# Patient Record
Sex: FEMALE | Race: WHITE | NOT HISPANIC OR LATINO | Employment: OTHER | URBAN - METROPOLITAN AREA
[De-identification: names, ages, dates, MRNs, and addresses within clinical notes are randomized per-mention and may not be internally consistent; named-entity substitution may affect disease eponyms.]

---

## 2017-01-23 ENCOUNTER — OFFICE VISIT (OUTPATIENT)
Dept: URGENT CARE | Facility: PHYSICIAN GROUP | Age: 77
End: 2017-01-23
Payer: MEDICARE

## 2017-01-23 VITALS
WEIGHT: 164 LBS | RESPIRATION RATE: 14 BRPM | OXYGEN SATURATION: 96 % | HEART RATE: 82 BPM | HEIGHT: 64 IN | BODY MASS INDEX: 28 KG/M2 | DIASTOLIC BLOOD PRESSURE: 82 MMHG | TEMPERATURE: 97.5 F | SYSTOLIC BLOOD PRESSURE: 128 MMHG

## 2017-01-23 DIAGNOSIS — R07.89 CHEST TIGHTNESS: ICD-10-CM

## 2017-01-23 DIAGNOSIS — R05.8 POST-VIRAL COUGH SYNDROME: ICD-10-CM

## 2017-01-23 PROCEDURE — 99214 OFFICE O/P EST MOD 30 MIN: CPT | Mod: 25 | Performed by: NURSE PRACTITIONER

## 2017-01-23 PROCEDURE — 94640 AIRWAY INHALATION TREATMENT: CPT | Performed by: NURSE PRACTITIONER

## 2017-01-23 PROCEDURE — 94760 N-INVAS EAR/PLS OXIMETRY 1: CPT | Mod: 59 | Performed by: NURSE PRACTITIONER

## 2017-01-23 RX ORDER — ALBUTEROL SULFATE 2.5 MG/3ML
2.5 SOLUTION RESPIRATORY (INHALATION) ONCE
Status: COMPLETED | OUTPATIENT
Start: 2017-01-23 | End: 2017-01-23

## 2017-01-23 RX ADMIN — ALBUTEROL SULFATE 2.5 MG: 2.5 SOLUTION RESPIRATORY (INHALATION) at 17:34

## 2017-01-23 ASSESSMENT — ENCOUNTER SYMPTOMS
COUGH: 1
DIAPHORESIS: 0
CHILLS: 0
WEAKNESS: 0
SHORTNESS OF BREATH: 1
WHEEZING: 0
MYALGIAS: 0
SPUTUM PRODUCTION: 0
SORE THROAT: 0
ORTHOPNEA: 0
FEVER: 0
PALPITATIONS: 0
HEADACHES: 0
BACK PAIN: 1
HEMOPTYSIS: 0
NECK PAIN: 0

## 2017-01-23 NOTE — PROGRESS NOTES
"Subjective:      Bailey White is a 76 y.o. female who presents with Cough            Cough  Associated symptoms include shortness of breath. Pertinent negatives include no chest pain, chills, ear pain, fever, headaches, hemoptysis, myalgias, rash, sore throat or wheezing.   Patient reports that 12 days ago she had a URI with fever, chills, and a harsh cough that was productive for clear to yellow sputum.  She reports that the fever has resolved and she feels general improvement.  However, she has lingering fatigue and a cough.  She reports the cough is mostly dry, although she produces some yellow sputum occasionally.  She feels soreness and chest tightness after coughing.  Massage of the back helps with her muscle tightness.  No current fever, chills, myalgias, or night sweats.  She had pleurisy once in the 1970s.      Review of Systems   Constitutional: Positive for malaise/fatigue. Negative for fever, chills and diaphoresis.   HENT: Negative for congestion, ear pain and sore throat.    Respiratory: Positive for cough and shortness of breath. Negative for hemoptysis, sputum production and wheezing.    Cardiovascular: Negative for chest pain, palpitations, orthopnea and leg swelling.   Musculoskeletal: Positive for back pain. Negative for myalgias and neck pain.   Skin: Negative for rash.   Neurological: Negative for weakness and headaches.     Medications, Allergies, and current problem list reviewed today in Epic     Objective:     /82 mmHg  Pulse 82  Temp(Src) 36.4 °C (97.5 °F)  Resp 14  Ht 1.638 m (5' 4.49\")  Wt 74.39 kg (164 lb)  BMI 27.73 kg/m2  SpO2 96%  LMP 01/01/1983     Physical Exam   Constitutional: She is oriented to person, place, and time. She appears well-developed and well-nourished. No distress.   HENT:   Head: Normocephalic.   Right Ear: External ear normal.   Left Ear: External ear normal.   Nose: Nose normal.   Mouth/Throat: Oropharynx is clear and moist. No " oropharyngeal exudate.   Eyes: Conjunctivae are normal. Pupils are equal, round, and reactive to light. Right eye exhibits no discharge. Left eye exhibits no discharge. No scleral icterus.   Neck: Neck supple. No JVD present. No tracheal deviation present. No thyromegaly present.   Cardiovascular: Normal rate, regular rhythm and normal heart sounds.  Exam reveals no gallop and no friction rub.    No murmur heard.  Pulmonary/Chest: Effort normal and breath sounds normal. No stridor. No respiratory distress. She has no wheezes. She has no rales. She exhibits no tenderness.   Dry cough in clinic.  Subjective chest tightness.     Musculoskeletal: She exhibits no edema.   Lymphadenopathy:     She has no cervical adenopathy.   Neurological: She is alert and oriented to person, place, and time.   Skin: Skin is warm and dry. No rash noted. She is not diaphoretic. No erythema.   Vitals reviewed.       In clinic medication: albuterol neb.  Patient tolerated neb for 3 minutes but then discontinued due to dizziness.       Assessment/Plan:     1. Post-viral cough syndrome    2. Chest tightness  - albuterol (PROVENTIL) 2.5mg/3ml nebulizer solution 2.5 mg; 3 mL by Nebulization route Once.    Discussed exam findings with patient and daughter.  Advised likely post viral cough.  No evidence of persistent infection.  Due to intolerance of in-clinic albuterol, I would not recommend home Rx of such medication.  Maintain adequate po hydration.  Muscle rubs, warm compress, and massage for upper back and upper chest wall pain from coughing.  Advised that a cough is functional; may consider OTC cough syrup at night but to allow coughing during daytime to clear any secretions.  Maintain adequate po hydration.  ED precautions reviewed.  Follow up in 1 week for any persistent symptoms, sooner if worse.  Patient and family verbalized understanding of and agreed with plan of care.

## 2017-01-30 RX ORDER — ACYCLOVIR 200 MG/1
CAPSULE ORAL
Qty: 90 CAP | Refills: 3 | Status: SHIPPED | OUTPATIENT
Start: 2017-01-30 | End: 2017-08-28 | Stop reason: SDUPTHER

## 2017-03-27 DIAGNOSIS — E11.9 DIABETES MELLITUS WITHOUT COMPLICATION (HCC): ICD-10-CM

## 2017-03-27 RX ORDER — GLIPIZIDE 5 MG/1
TABLET ORAL
Qty: 45 TAB | Refills: 3 | Status: SHIPPED | OUTPATIENT
Start: 2017-03-27 | End: 2017-10-27 | Stop reason: SDUPTHER

## 2017-04-05 ENCOUNTER — HOSPITAL ENCOUNTER (OUTPATIENT)
Dept: LAB | Facility: MEDICAL CENTER | Age: 77
End: 2017-04-05
Attending: INTERNAL MEDICINE
Payer: MEDICARE

## 2017-04-05 DIAGNOSIS — I10 ESSENTIAL HYPERTENSION: ICD-10-CM

## 2017-04-05 DIAGNOSIS — E11.9 DIABETES MELLITUS TYPE 2, NONINSULIN DEPENDENT (HCC): ICD-10-CM

## 2017-04-05 LAB
25(OH)D3 SERPL-MCNC: 44 NG/ML (ref 30–100)
CREAT SERPL-MCNC: 0.88 MG/DL (ref 0.5–1.4)
EST. AVERAGE GLUCOSE BLD GHB EST-MCNC: 140 MG/DL
GFR SERPL CREATININE-BSD FRML MDRD: >60 ML/MIN/1.73 M 2
HBA1C MFR BLD: 6.5 % (ref 0–5.6)
T4 FREE SERPL-MCNC: 1.02 NG/DL (ref 0.53–1.43)
TSH SERPL DL<=0.005 MIU/L-ACNC: 3.91 UIU/ML (ref 0.3–3.7)

## 2017-04-05 PROCEDURE — 83036 HEMOGLOBIN GLYCOSYLATED A1C: CPT | Mod: GA

## 2017-04-05 PROCEDURE — 82565 ASSAY OF CREATININE: CPT

## 2017-04-05 PROCEDURE — 36415 COLL VENOUS BLD VENIPUNCTURE: CPT | Mod: GA

## 2017-04-05 PROCEDURE — 82306 VITAMIN D 25 HYDROXY: CPT | Mod: GA

## 2017-04-05 PROCEDURE — 84443 ASSAY THYROID STIM HORMONE: CPT

## 2017-04-05 PROCEDURE — 84439 ASSAY OF FREE THYROXINE: CPT

## 2017-04-14 ENCOUNTER — OFFICE VISIT (OUTPATIENT)
Dept: MEDICAL GROUP | Facility: CLINIC | Age: 77
End: 2017-04-14
Payer: MEDICARE

## 2017-04-14 VITALS
HEIGHT: 64 IN | RESPIRATION RATE: 16 BRPM | TEMPERATURE: 96.2 F | WEIGHT: 165 LBS | HEART RATE: 98 BPM | BODY MASS INDEX: 28.17 KG/M2 | OXYGEN SATURATION: 99 % | DIASTOLIC BLOOD PRESSURE: 70 MMHG | SYSTOLIC BLOOD PRESSURE: 130 MMHG

## 2017-04-14 DIAGNOSIS — G89.29 CHRONIC BILATERAL LOW BACK PAIN WITH SCIATICA, SCIATICA LATERALITY UNSPECIFIED: ICD-10-CM

## 2017-04-14 DIAGNOSIS — I63.9 CEREBROVASCULAR ACCIDENT (CVA), UNSPECIFIED MECHANISM (HCC): ICD-10-CM

## 2017-04-14 DIAGNOSIS — K21.9 GASTROESOPHAGEAL REFLUX DISEASE WITHOUT ESOPHAGITIS: ICD-10-CM

## 2017-04-14 DIAGNOSIS — E11.9 DIABETES MELLITUS TYPE 2, NONINSULIN DEPENDENT (HCC): ICD-10-CM

## 2017-04-14 DIAGNOSIS — I10 ESSENTIAL HYPERTENSION: ICD-10-CM

## 2017-04-14 DIAGNOSIS — G89.29 CHRONIC HIP PAIN, UNSPECIFIED LATERALITY: ICD-10-CM

## 2017-04-14 DIAGNOSIS — M54.40 CHRONIC BILATERAL LOW BACK PAIN WITH SCIATICA, SCIATICA LATERALITY UNSPECIFIED: ICD-10-CM

## 2017-04-14 DIAGNOSIS — M25.559 CHRONIC HIP PAIN, UNSPECIFIED LATERALITY: ICD-10-CM

## 2017-04-14 DIAGNOSIS — E03.9 HYPOTHYROIDISM, UNSPECIFIED TYPE: ICD-10-CM

## 2017-04-14 PROCEDURE — G8419 CALC BMI OUT NRM PARAM NOF/U: HCPCS | Performed by: INTERNAL MEDICINE

## 2017-04-14 PROCEDURE — 1036F TOBACCO NON-USER: CPT | Performed by: INTERNAL MEDICINE

## 2017-04-14 PROCEDURE — G8432 DEP SCR NOT DOC, RNG: HCPCS | Performed by: INTERNAL MEDICINE

## 2017-04-14 PROCEDURE — 1101F PT FALLS ASSESS-DOCD LE1/YR: CPT | Performed by: INTERNAL MEDICINE

## 2017-04-14 PROCEDURE — 4040F PNEUMOC VAC/ADMIN/RCVD: CPT | Mod: 8P | Performed by: INTERNAL MEDICINE

## 2017-04-14 PROCEDURE — G8598 ASA/ANTIPLAT THER USED: HCPCS | Performed by: INTERNAL MEDICINE

## 2017-04-14 PROCEDURE — 99214 OFFICE O/P EST MOD 30 MIN: CPT | Performed by: INTERNAL MEDICINE

## 2017-04-14 NOTE — MR AVS SNAPSHOT
"        Bailey Daina Destecroix   2017 4:40 PM   Office Visit   MRN: [id number]    Department:  [facility]   Dept Phone:  [phone]    Description:  Female : 1940   Provider:  Dave Clemons M.D.           Allergies as of 2017     Allergen Noted Reactions    Asa [Aspirin] 2009       GI bleeding    Ciprofloxacin 2012   Rash    Clindamycin 2012   Hives    Codeine 2009   Vomiting    Food 2013   Anxiety    Yellow dye, peanuts and peanut products, all peppers,tomato, potato,wheat, gluten    Hydrocortisone 2011       Suicidal after Cortisone injection    Ibuprofen 2012       Abdominal pain    Imitrex [Sumatriptan Succinate] 2009   Vomiting    Inderal [Propranolol Hcl] 2009       Not sure of reaction    Lyrica 2013       suicidal    Mellaril 2009       Psychotic episode    Other Drug 2011       All mycins,cillins,and cyclines    Pcn [Penicillins] 2008   Hives    Ropinirole Hcl [Requip] 2011   Shortness of Breath    Shellfish Allergy 2013       headaches    Sulfa Drugs 2008   Itching    Tagamet [Cimetidine] 2013       Mental confusion      You were diagnosed with     Diabetes mellitus type 2, noninsulin dependent (CMS-HCC)   [309338]       Hypothyroidism, unspecified type   [8880811]         Vital Signs     Blood Pressure Pulse Temperature Respirations Height Weight    130/70 mmHg 98 35.7 °C (96.2 °F) 16 1.638 m (5' 4.49\") 74.844 kg (165 lb)    Body Mass Index Oxygen Saturation Last Menstrual Period Smoking Status          27.90 kg/m2 99% 1983 Former Smoker        Basic Information     Date Of Birth Sex Race Ethnicity Preferred Language    1940 Female White Non- English      Problem List              ICD-10-CM Priority Class Noted - Resolved    Fibromyalgia M79.7   Unknown - Present    Insomnia G47.00   Unknown - Present    CVA (Cerebral Vascular Accident), h/o I63.9 High  " Unknown - Present    Carotid stenosis I65.29   Unknown - Present    Herpes, genital A60.00   Unknown - Present    HTN (hypertension) I10   Unknown - Present    Hypothyroidism E03.9   5/4/2010 - Present    Near syncope R55 High  2/17/2012 - Present    Hypoglycemia - possible E16.2   2/17/2012 - Present    Vitamin d deficiency    10/18/2012 - Present    Abnormal ultrasound of endometrium    9/3/2013 - Present    Thickened endometrium R93.8   9/3/2013 - Present    Pelvic pain    9/3/2013 - Present    Female genital symptoms N94.9   9/5/2013 - Present    Nonspecific (abnormal) findings on radiological and other examination of genitourinary organs R93.8   9/5/2013 - Present    Diabetes mellitus type 2, noninsulin dependent (CMS-HCC) E11.9   2/12/2015 - Present      Health Maintenance        Date Due Completion Dates    RETINAL SCREENING 3/18/1956 ---    IMM DTaP/Tdap/Td Vaccine (1 - Tdap) 3/18/1957 ---    PAP SMEAR 3/18/1959 ---    COLONOSCOPY 3/18/1988 ---    IMM ZOSTER VACCINE 3/18/1998 ---    IMM PNEUMOCOCCAL 65+ (ADULT) LOW/MEDIUM RISK SERIES (1 of 2 - PCV13) 3/18/2003 ---    FASTING LIPID PROFILE 4/10/2014 4/10/2013, 10/8/2012, 3/6/2012, 2/18/2012, 11/14/2011, 7/15/2011, 4/27/2010, 11/2/2009, 3/6/2009    MAMMOGRAM 7/25/2014 7/25/2013, 4/4/2011, 3/20/2009, 3/20/2009    DIABETES MONOFILAMENT / LE EXAM 11/14/2014 11/14/2013 (Done)    Override on 11/14/2013: Done (Right foot 5/5, left foot 5/5)    BONE DENSITY 4/4/2016 4/4/2011    URINE ACR / MICROALBUMIN 9/19/2017 9/19/2016, 2/24/2016, 8/19/2015, 2/12/2014    A1C SCREENING 10/5/2017 4/5/2017, 9/19/2016, 2/24/2016, 8/19/2015, 2/4/2015, 9/17/2014, 5/14/2014, 2/12/2014, 11/6/2013, 7/10/2013, 4/10/2013, 1/16/2013, 10/8/2012, 6/25/2012, 3/6/2012, 11/14/2011, 5/3/2010    SERUM CREATININE 4/5/2018 4/5/2017, 2/24/2016, 8/19/2015, 2/4/2015, 9/17/2014, 5/14/2014, 2/12/2014, 11/6/2013, 9/4/2013, 7/10/2013, 5/15/2013, 4/10/2013, 1/16/2013, 10/8/2012, 6/25/2012, 3/6/2012,  2/21/2012, 2/19/2012, 2/18/2012, 2/16/2012, 12/6/2011, 11/14/2011, 7/23/2011, 5/3/2010, 6/18/2009, 3/6/2009            Current Immunizations     No immunizations on file.      Below and/or attached are the medications your provider expects you to take. Review all of your home medications and newly ordered medications with your provider and/or pharmacist. Follow medication instructions as directed by your provider and/or pharmacist. Please keep your medication list with you and share with your provider. Update the information when medications are discontinued, doses are changed, or new medications (including over-the-counter products) are added; and carry medication information at all times in the event of emergency situations     Allergies:  ASA - (reactions not documented)     CIPROFLOXACIN - Rash     CLINDAMYCIN - Hives     CODEINE - Vomiting     FOOD - Anxiety     HYDROCORTISONE - (reactions not documented)     IBUPROFEN - (reactions not documented)     IMITREX - Vomiting     INDERAL - (reactions not documented)     LYRICA - (reactions not documented)     MELLARIL - (reactions not documented)     OTHER DRUG - (reactions not documented)     PCN - Hives     ROPINIROLE HCL - Shortness of Breath     SHELLFISH ALLERGY - (reactions not documented)     SULFA DRUGS - Itching     TAGAMET - (reactions not documented)               Medications  Valid as of: April 14, 2017 -  4:53 PM    Generic Name Brand Name Tablet Size Instructions for use    Acetaminophen (Tab) TYLENOL 500 MG Take 500-1,000 mg by mouth every 6 hours as needed.        Acyclovir (Cap) ZOVIRAX 200 MG TAKE 1 CAPSULE EVERY DAY        AmLODIPine Besylate (Tab) NORVASC 5 MG TAKE 1 TABLET EVERY DAY        Blood Glucose Monitoring Suppl (Device) Blood Glucose Monitoring Suppl  Meter: Dispense Device of Insurance Preference. Sig. Use as directed for blood sugar monitoring. #1. NR.        Cholecalciferol (Cap) Vitamin D 2000 UNITS Take  by mouth every day.         Clopidogrel Bisulfate (Tab) PLAVIX 75 MG TAKE 1 TABLET EVERY DAY        Docusate Sodium (Cap) COLACE 100 MG Take 1 Cap by mouth 2 times a day.        Fluticasone Propionate (Suspension) FLONASE 50 MCG/ACT Spray 1 Spray in nose every day. Each Nostril        GlipiZIDE (Tab) GLUCOTROL 5 MG TAKE 1/2 TABLET TWICE DAILY        Glycerin (Laxative) (Suppos) glycerin (laxative) 2 GM Insert 1 Suppository in rectum 1 time daily as needed for Constipation.        Levothyroxine Sodium (Tab) SYNTHROID 50 MCG TAKE 1 TABLET EVERY DAY        Non Formulary Request Non Formulary Request  as needed. OTC Restless leg        Omeprazole (CAPSULE DELAYED RELEASE) PRILOSEC 20 MG TAKE 1 CAPSULE EVERY DAY        .                 Medicines prescribed today were sent to:     [facility] - [place], [place] - [address]    [address] [facility] [address]    Phone: [phone] Fax: [phone]    Open 24 Hours?: No    [facility] [place], [place] - [address] [facility] [address]    Phone: [phone] Fax: [phone]    Open 24 Hours?: No    [facility] #[id number]  [place] - [address] [place] [address]    Phone: [phone] Fax: [phone]    Open 24 Hours?: No      Medication refill instructions:       If your prescription bottle indicates you have medication refills left, it is not necessary to call your provider’s office. Please contact your pharmacy and they will refill your medication.    If your prescription bottle indicates you do not have any refills left, you may request refills at any time through one of the following ways: The online [employer] system (except Urgent Care), by calling your provider’s office, or by asking your pharmacy to contact your provider’s office with a refill request. Medication refills are processed only during regular business hours and may not be available until the next  business day. Your provider may request additional information or to have a follow-up visit with you prior to refilling your medication.   *Please Note: Medication refills are assigned a new Rx number when refilled electronically. Your pharmacy may indicate that no refills were authorized even though a new prescription for the same medication is available at the pharmacy. Please request the medicine by name with the pharmacy before contacting your provider for a refill.        Your To Do List     Future Labs/Procedures Complete By Expires    CREATININE  As directed 4/14/2018    HEMOGLOBIN A1C  As directed 4/14/2018    LIPOPROTEIN A  As directed 4/14/2018         [employer] Access Code: Activation code not generated  Current [employer] Status: Active

## 2017-04-14 NOTE — PROGRESS NOTES
Subjective:  Bailey is a 77 y.o. female with the following   Past Medical History   Diagnosis Date   • Insomnia    • Carotid stenosis    • Herpes genital    • HTN    • Fibromyalgia    • Fibromyalgia    • GERD (gastroesophageal reflux disease)    • Thyroid disease    • Arthritis    • Blood transfusion    • Migraine    • Anesthesia      woke up during foot surgery   • Unspecified urinary incontinence    • Heart burn    • Indigestion    • Hiatus hernia syndrome    • Urinary bladder disorder      incontinence   • CATARACT      repaired   • Pain    • Seizure (CMS-HCC)      last seizure 2010   • Unspecified hemorrhagic conditions (CMS-HCC)      on Plavix   • Bronchitis 2011   • TIA      multiple   • CVA (cerebral vascular accident) (CMS-HCC)    • Diabetes      off metformin diet controlled      Family History   Problem Relation Age of Onset   • Heart Disease Father    • Cancer Maternal Grandmother      The patient is on the following medications:   Current outpatient prescriptions:   •  glipiZIDE (GLUCOTROL) 5 MG Tab, TAKE 1/2 TABLET TWICE DAILY, Disp: 45 Tab, Rfl: 3  •  acyclovir (ZOVIRAX) 200 MG Cap, TAKE 1 CAPSULE EVERY DAY, Disp: 90 Cap, Rfl: 3  •  clopidogrel (PLAVIX) 75 MG Tab, TAKE 1 TABLET EVERY DAY, Disp: 90 Tab, Rfl: 3  •  levothyroxine (SYNTHROID) 50 MCG Tab, TAKE 1 TABLET EVERY DAY, Disp: 90 Tab, Rfl: 3  •  amlodipine (NORVASC) 5 MG Tab, TAKE 1 TABLET EVERY DAY, Disp: 90 Tab, Rfl: 3  •  omeprazole (PRILOSEC) 20 MG delayed-release capsule, TAKE 1 CAPSULE EVERY DAY, Disp: 90 Cap, Rfl: 3  •  Blood Glucose Monitoring Suppl Device, Meter: Dispense Device of Insurance Preference. Sig. Use as directed for blood sugar monitoring. #1. NR., Disp: 1 Device, Rfl: 0  •  docusate sodium (COLACE) 100 MG Cap, Take 1 Cap by mouth 2 times a day., Disp: 60 Cap, Rfl: 3  •  glycerin, laxative, 2 GM Suppos, Insert 1 Suppository in rectum 1 time daily as needed for Constipation., Disp: 10 Suppository, Rfl: 0  •  fluticasone (FLONASE)  "50 MCG/ACT nasal spray, Spray 1 Spray in nose every day. Each Nostril, Disp: 16 g, Rfl: 1  •  Cholecalciferol (VITAMIN D) 2000 UNITS CAPS, Take  by mouth every day., Disp: , Rfl:   •  acetaminophen (TYLENOL) 500 MG TABS, Take 500-1,000 mg by mouth every 6 hours as needed., Disp: , Rfl:   •  Non Formulary Request, as needed. OTC Restless leg, Disp: , Rfl:     HPI; Patient is here today for follow-up visit regarding her recent labs, currently on glipizide for diabetes denies having polyuria or polydipsia, on amlodipine for hypertension denies having lower extremity edema, on progress of her care denies having dysphagia, on levothyroxine for hypothyroidism denies having cold intolerance or constipation, on Plavix for history of CVA denies having motor or sensory loss.  ROS:  See HPI    Blood pressure 130/70, pulse 98, temperature 35.7 °C (96.2 °F), resp. rate 16, height 1.638 m (5' 4.49\"), weight 74.844 kg (165 lb), last menstrual period 01/01/1983, SpO2 99 %.on RA  Objective:  Patient is well appearing and in no acute distress.  Pharynx is clear.  Neck is soft and supple with no cervical or supraclavicular lymphadenopathy, thyromegaly or masses, no JVD.  Lungs clear to auscultation bilaterally with normal respiratory effort. Abdomen soft, non-tender on palpation,not distended. Heart regular rate and rhythm without murmur. Extremities without any clubbing, cyanosis, or edema.    Assessment and Plan:  1. DM (diabetes mellitus); on glipizide 2.5 mg by mouth twice a day, intolerant to higher dose due to hypoglycemia, history of intolerance to metformin , followup hemoglobin A1c;    Ref. Range 9/19/2016 08:22 4/5/2017 08:31   Glycohemoglobin Latest Ref Range: 0.0-5.6 % 7.3 (H) 6.5 (H)        Component  Latest Ref Rng    11/14/2011         8:47 AM      Glycohemoglobin  0.0 - 5.6 %    11.5      Estim. Avg Glu    283          2. CVA (cerebral infarction); clinically stable on Plavix 75 mg daily, no recurrent symptoms.      3. " Herpes genitalia; stable on prophylactic Zovirax.       4. HTN (hypertension); blood pressure is stable on amlodipine 5 mg daily ;      Ref. Range 2/24/2016 08:48 4/5/2017 08:31   Creatinine Latest Ref Range: 0.50-1.40 mg/dL 0.91 0.88   GFR If  Latest Ref Range: >60 mL/min/1.73 m 2 >60 >60   GFR If Non  Latest Ref Range: >60 mL/min/1.73 m 2 60 >60     5. Hypothyroidism; on levothyroxine 50 MCG daily, (asymptomatic..   Ref. Range 9/17/2014 08:55 2/4/2015 09:14 4/5/2017 08:31   TSH Latest Ref Range: 0.300-3.700 uIU/mL 3.640 3.190 3.910 (H)   Free T-4 Latest Ref Range: 0.53-1.43 ng/dL 0.94 0.92 1.02        6. GERD (gastroesophageal reflux disease); it has responded to Prilosec 20 mg daily         7. chronic low back pain/bilateral hip pain  ; according to the patient she has preferred chiropractor manipulation , , seems to be helping.    Lumbar MRI    FINDINGS:The lowest formed intervertebral disc will be designated L5-S1 for the purposes of this report and vertebral levels numbered accordingly.    There is mild degenerative retrolisthesis of L1 on L2 and of L2 on L3.  There is no gross malalignment.  No acute or suspicious osseous lesion is seen.  There is loss of intervertebral disc height at L1-L2 and L2-L3.  There are Modic type I degenerative endplate changes adjacent to the L2 and L3 intervertebral disc. There are small Schmorl nodes in the inferior endplate of L2 and superior endplate of L3.  The conus medullaris has a normal caliber course and signal intensity.    Level specific findings as follows:  T12-L1: Diffuse disc bulge. Mild BILATERAL facet arthropathy.  L1-2: Diffuse disc bulge. Minimal central canal narrowing. Mild BILATERAL neural foraminal narrowing.  L2-3: Disc bulge. Mild BILATERAL facet arthropathy. Minimal central canal narrowing. Mild BILATERAL neural foraminal narrowing.  L3-4: Moderate RIGHT facet arthropathy.  L4-5: Mild to moderate BILATERAL facet  arthropathy. Mild LEFT neural foraminal narrowing.  L5-S1: Small posterior disc protrusion. Moderate BILATERAL facet arthropathy. Moderate BILATERAL neural foraminal narrowing.    Soft tissues: No abdominal aortic aneurysm or soft tissue mass is seen.         Impression        1.  Multilevel multifactorial degenerative changes with disc disease more prominent in the upper lumbar spine and facet disease more prominent in the lower lumbar spine  2.  Neural foraminal narrowing worst bilaterally at L5-S1  3.  No areas of high-grade central canal narrowing           Hip MRI;   FINDINGS:    There is a physiologic amount of joint fluid.    Mild cartilage thinning and irregularity of the acetabular and femoral head cartilage.    No bone marrow signal intensity abnormality is present to suggest avascular necrosis, fracture, transient osteoporosis, infection, or mass.    Questionable small paralabral cyst at the base of the anterosuperior labrum but evaluation is limited on this nonarthrographic study.   There is no muscle tear or edema.    Mild tendinosis of the gluteus minimus tendons. Mild tendinosis of the hamstring tendon.    The gluteus medius, rectus femoris, iliopsoas tendons are unremarkable.  __________________________________         Impression          1. Mild osteoarthritis of the right hip joint.  2. Mild tendinosis of the right gluteus minimus tendon, slightly worse than the left side.  3. Mild tendinosis of the hamstring tendon.  4. Questionable small paralabral cyst at the base of the anterosuperior labrum, suggesting of a labral tear.           8. Vitamin D deficiency; currently on vitamin D supplements..    Ref. Range 11/6/2013 09:11 4/5/2017 08:31   25-Hydroxy   Vitamin D 25 Latest Ref Range:  ng/mL 43 44         9. increased BMI       Patient is advised regarding preventive and supportive care, diet and lifestyle modification, daily walking, weight loss,    Please note that this dictation was  created using voice recognition software. I have worked with consultants from the vendor as well as technical experts from [facility] to optimize the interface. I have made every reasonable attempt to correct obvious errors, but I expect that there are errors of grammar and possibly content that I did not discover before finalizing the note

## 2017-08-28 RX ORDER — ACYCLOVIR 200 MG/1
200 CAPSULE ORAL
Qty: 90 CAP | Refills: 3 | Status: SHIPPED | OUTPATIENT
Start: 2017-08-28 | End: 2017-10-30 | Stop reason: SDUPTHER

## 2017-08-28 RX ORDER — LEVOTHYROXINE SODIUM 0.05 MG/1
50 TABLET ORAL
Qty: 90 TAB | Refills: 0 | Status: SHIPPED | OUTPATIENT
Start: 2017-08-28 | End: 2017-09-29

## 2017-08-28 RX ORDER — AMLODIPINE BESYLATE 5 MG/1
5 TABLET ORAL
Qty: 90 TAB | Refills: 0 | Status: SHIPPED | OUTPATIENT
Start: 2017-08-28 | End: 2017-10-27 | Stop reason: SDUPTHER

## 2017-08-28 RX ORDER — CLOPIDOGREL BISULFATE 75 MG/1
75 TABLET ORAL
Qty: 90 TAB | Refills: 0 | Status: SHIPPED | OUTPATIENT
Start: 2017-08-28 | End: 2017-10-27 | Stop reason: SDUPTHER

## 2017-09-22 ENCOUNTER — HOSPITAL ENCOUNTER (OUTPATIENT)
Dept: LAB | Facility: MEDICAL CENTER | Age: 77
End: 2017-09-22
Attending: INTERNAL MEDICINE
Payer: MEDICARE

## 2017-09-22 DIAGNOSIS — E11.9 DIABETES MELLITUS TYPE 2, NONINSULIN DEPENDENT (HCC): ICD-10-CM

## 2017-09-22 LAB
CREAT SERPL-MCNC: 0.94 MG/DL (ref 0.5–1.4)
EST. AVERAGE GLUCOSE BLD GHB EST-MCNC: 163 MG/DL
GFR SERPL CREATININE-BSD FRML MDRD: 57 ML/MIN/1.73 M 2
HBA1C MFR BLD: 7.3 % (ref 0–5.6)
T4 FREE SERPL-MCNC: 0.9 NG/DL (ref 0.53–1.43)
TSH SERPL DL<=0.005 MIU/L-ACNC: 5.38 UIU/ML (ref 0.3–3.7)

## 2017-09-22 PROCEDURE — 84443 ASSAY THYROID STIM HORMONE: CPT

## 2017-09-22 PROCEDURE — 84439 ASSAY OF FREE THYROXINE: CPT

## 2017-09-22 PROCEDURE — 83036 HEMOGLOBIN GLYCOSYLATED A1C: CPT | Mod: GA

## 2017-09-22 PROCEDURE — 82565 ASSAY OF CREATININE: CPT

## 2017-09-22 PROCEDURE — 83695 ASSAY OF LIPOPROTEIN(A): CPT | Mod: GA

## 2017-09-22 PROCEDURE — 36415 COLL VENOUS BLD VENIPUNCTURE: CPT | Mod: GA

## 2017-09-27 LAB — LPA SERPL-MCNC: 2 MG/DL

## 2017-09-29 ENCOUNTER — OFFICE VISIT (OUTPATIENT)
Dept: MEDICAL GROUP | Facility: PHYSICIAN GROUP | Age: 77
End: 2017-09-29
Payer: MEDICARE

## 2017-09-29 VITALS
TEMPERATURE: 97.1 F | BODY MASS INDEX: 29.32 KG/M2 | RESPIRATION RATE: 16 BRPM | WEIGHT: 176 LBS | SYSTOLIC BLOOD PRESSURE: 116 MMHG | DIASTOLIC BLOOD PRESSURE: 80 MMHG | OXYGEN SATURATION: 94 % | HEIGHT: 65 IN | HEART RATE: 68 BPM

## 2017-09-29 DIAGNOSIS — A60.00 GENITAL HERPES SIMPLEX, UNSPECIFIED SITE: ICD-10-CM

## 2017-09-29 DIAGNOSIS — D75.89 MACROCYTOSIS WITHOUT ANEMIA: ICD-10-CM

## 2017-09-29 DIAGNOSIS — N28.9 RENAL INSUFFICIENCY: ICD-10-CM

## 2017-09-29 DIAGNOSIS — K21.9 GASTROESOPHAGEAL REFLUX DISEASE WITHOUT ESOPHAGITIS: ICD-10-CM

## 2017-09-29 DIAGNOSIS — E03.9 HYPOTHYROIDISM, UNSPECIFIED TYPE: ICD-10-CM

## 2017-09-29 DIAGNOSIS — E11.9 DIABETES MELLITUS TYPE 2, NONINSULIN DEPENDENT (HCC): ICD-10-CM

## 2017-09-29 DIAGNOSIS — I10 ESSENTIAL HYPERTENSION: ICD-10-CM

## 2017-09-29 PROCEDURE — 99214 OFFICE O/P EST MOD 30 MIN: CPT | Performed by: INTERNAL MEDICINE

## 2017-09-29 RX ORDER — LEVOTHYROXINE SODIUM 0.07 MG/1
75 TABLET ORAL
Qty: 90 TAB | Refills: 3 | Status: SHIPPED | OUTPATIENT
Start: 2017-09-29 | End: 2018-07-30 | Stop reason: SDUPTHER

## 2017-09-29 NOTE — PROGRESS NOTES
Subjective:  Bailey is a 77 y.o. female with the following   Past Medical History:   Diagnosis Date   • Bronchitis 2011   • Anesthesia     woke up during foot surgery   • Arthritis    • Blood transfusion    • Carotid stenosis    • CATARACT     repaired   • CVA (cerebral vascular accident) (CMS-HCC)    • Diabetes     off metformin diet controlled   • Fibromyalgia    • Fibromyalgia    • GERD (gastroesophageal reflux disease)    • Heart burn    • Herpes genital    • Hiatus hernia syndrome    • HTN    • Indigestion    • Insomnia    • Migraine    • Pain    • Seizure (CMS-HCC)     last seizure 2010   • Thyroid disease    • TIA     multiple   • Unspecified hemorrhagic conditions     on Plavix   • Unspecified urinary incontinence    • Urinary bladder disorder     incontinence      Family History   Problem Relation Age of Onset   • Heart Disease Father    • Cancer Maternal Grandmother      The patient is on the following medications:   Current Outpatient Prescriptions:   •  amlodipine (NORVASC) 5 MG Tab, Take 1 Tab by mouth every day., Disp: 90 Tab, Rfl: 0  •  clopidogrel (PLAVIX) 75 MG Tab, Take 1 Tab by mouth every day., Disp: 90 Tab, Rfl: 0  •  levothyroxine (SYNTHROID) 50 MCG Tab, Take 1 Tab by mouth every day., Disp: 90 Tab, Rfl: 0  •  acyclovir (ZOVIRAX) 200 MG Cap, Take 1 Cap by mouth every day., Disp: 90 Cap, Rfl: 3  •  glipiZIDE (GLUCOTROL) 5 MG Tab, TAKE 1/2 TABLET TWICE DAILY, Disp: 45 Tab, Rfl: 3  •  omeprazole (PRILOSEC) 20 MG delayed-release capsule, TAKE 1 CAPSULE EVERY DAY, Disp: 90 Cap, Rfl: 3  •  Blood Glucose Monitoring Suppl Device, Meter: Dispense Device of Insurance Preference. Sig. Use as directed for blood sugar monitoring. #1. NR., Disp: 1 Device, Rfl: 0  •  docusate sodium (COLACE) 100 MG Cap, Take 1 Cap by mouth 2 times a day., Disp: 60 Cap, Rfl: 3  •  glycerin, laxative, 2 GM Suppos, Insert 1 Suppository in rectum 1 time daily as needed for Constipation., Disp: 10 Suppository, Rfl: 0  •   "fluticasone (FLONASE) 50 MCG/ACT nasal spray, Spray 1 Spray in nose every day. Each Nostril, Disp: 16 g, Rfl: 1  •  acetaminophen (TYLENOL) 500 MG TABS, Take 500-1,000 mg by mouth every 6 hours as needed., Disp: , Rfl:   •  Cholecalciferol (VITAMIN D) 2000 UNITS CAPS, Take  by mouth every day., Disp: , Rfl:   •  Non Formulary Request, as needed. OTC Restless leg, Disp: , Rfl:     HPI; Patient is here today for follow-up visit regarding her recent labs, currently on glipizide for diabetes denies having polyuria or polydipsia, admitting that hasn't been compliant with diabetic diet and lifestyle for last several months. Patient is also on Plavix for history of recurrent CVA denies having motor or sensory loss, on Zovirax as a prophylactic for recurrent herpes genitalis denies having vaginal pain or discharge. Continues with amlodipine for hypertension denies having lower extremity edema, on levothyroxine for hypothyroidism complaining of mild  depressive symptoms denies any suicidal ideation.  ROS:  See HPI    Blood pressure 116/80, pulse 68, temperature 36.2 °C (97.1 °F), resp. rate 16, height 1.638 m (5' 4.5\"), last menstrual period 01/01/1983, SpO2 94 %.on RA  Objective:  Patient is well appearing and in no acute distress.  Pharynx is clear.  Neck is soft and supple with no cervical or supraclavicular lymphadenopathy, thyromegaly or masses, no JVD.  Lungs clear to auscultation bilaterally with normal respiratory effort. Abdomen soft, non-tender on palpation,not distended. Heart regular rate and rhythm without murmur. Extremities without any clubbing, cyanosis, or edema.    Assessment and Plan:  1. DM (diabetes mellitus); on glipizide 2.5 mg by mouth twice a day, intolerant to higher dose due to hypoglycemia, history of intolerance to metformin , followup hemoglobin A1c;    Ref. Range 9/19/2016 08:22 4/5/2017 08:31 9/22/2017 08:36   Glycohemoglobin Latest Ref Range: 0.0 - 5.6 % 7.3 (H) 6.5 (H) 7.3 (H) "           Component  Latest Ref Rng    11/14/2011         8:47 AM      Glycohemoglobin  0.0 - 5.6 %    11.5      Estim. Avg Glu    283            2. CVA (cerebral infarction); clinically stable on Plavix 75 mg daily, no recurrent symptoms.        3. Herpes genitalia; stable on prophylactic Zovirax.         4. HTN (hypertension)/ Renal insufficiency; blood pressure is stable on amlodipine 5 mg daily ;      Ref. Range 2/24/2016 08:48 4/5/2017 08:31 9/22/2017 08:36   Creatinine Latest Ref Range: 0.50 - 1.40 mg/dL 0.91 0.88 0.94   GFR If  Latest Ref Range: >60 mL/min/1.73 m 2 >60 >60 >60   GFR If Non  Latest Ref Range: >60 mL/min/1.73 m 2 60 >60 57 (A)        5. Hypothyroidism; on levothyroxine 50 MCG daily,    Ref. Range 4/5/2017 08:31 9/22/2017 08:36   TSH Latest Ref Range: 0.300 - 3.700 uIU/mL 3.910 (H) 5.380 (H)   Free T-4 Latest Ref Range: 0.53 - 1.43 ng/dL 1.02 0.90           6. GERD (gastroesophageal reflux disease); it has responded to Prilosec 20 mg daily            7. chronic low back pain/bilateral hip pain  ;It has responded to chiropractic manipulation.        8. Vitamin D deficiency; currently on vitamin D supplements..            9. increased BMI         10. History of macrocytosis without anemia . Denies having chronic alcohol use.           Patient is advised regarding preventive and supportive care, diet and lifestyle modification, daily walking, weight loss, levothyroxine 75 MCG daily, alternative therapies for diabetes, Increase fluid intake, monitor labs.    Please note that this dictation was created using voice recognition software. I have worked with consultants from the vendor as well as technical experts from [employer] to optimize the interface. I have made every reasonable attempt to correct obvious errors, but I expect that there are errors of grammar and possibly content that I did not discover before finalizing the note.

## 2017-10-30 ENCOUNTER — TELEPHONE (OUTPATIENT)
Dept: MEDICAL GROUP | Facility: PHYSICIAN GROUP | Age: 77
End: 2017-10-30

## 2017-10-30 RX ORDER — OMEPRAZOLE 20 MG/1
CAPSULE, DELAYED RELEASE ORAL
Qty: 90 CAP | Refills: 3 | Status: SHIPPED | OUTPATIENT
Start: 2017-10-30 | End: 2018-07-30 | Stop reason: SDUPTHER

## 2017-10-30 RX ORDER — GLIPIZIDE 5 MG/1
TABLET ORAL
Qty: 90 TAB | Refills: 3 | Status: SHIPPED | OUTPATIENT
Start: 2017-10-30 | End: 2018-07-30 | Stop reason: SDUPTHER

## 2017-10-30 RX ORDER — ACYCLOVIR 200 MG/1
200 CAPSULE ORAL
Qty: 90 CAP | Refills: 3 | Status: SHIPPED | OUTPATIENT
Start: 2017-10-30 | End: 2018-07-30 | Stop reason: SDUPTHER

## 2017-10-30 RX ORDER — AMLODIPINE BESYLATE 5 MG/1
TABLET ORAL
Qty: 90 TAB | Refills: 3 | Status: SHIPPED | OUTPATIENT
Start: 2017-10-30 | End: 2018-07-30 | Stop reason: SDUPTHER

## 2017-10-30 RX ORDER — CLOPIDOGREL BISULFATE 75 MG/1
TABLET ORAL
Qty: 90 TAB | Refills: 3 | Status: SHIPPED | OUTPATIENT
Start: 2017-10-30 | End: 2018-07-30 | Stop reason: SDUPTHER

## 2017-10-30 NOTE — TELEPHONE ENCOUNTER
Patient LVM questioning what antibiotic she was prescribed in July 2011.  Appears patient was prescribed Avelox.  Patient informed.

## 2017-12-01 ENCOUNTER — OFFICE VISIT (OUTPATIENT)
Dept: MEDICAL GROUP | Facility: PHYSICIAN GROUP | Age: 77
End: 2017-12-01
Payer: MEDICARE

## 2017-12-01 ENCOUNTER — TELEPHONE (OUTPATIENT)
Dept: MEDICAL GROUP | Facility: PHYSICIAN GROUP | Age: 77
End: 2017-12-01

## 2017-12-01 VITALS
BODY MASS INDEX: 29.46 KG/M2 | TEMPERATURE: 97.5 F | HEART RATE: 84 BPM | WEIGHT: 176.8 LBS | SYSTOLIC BLOOD PRESSURE: 130 MMHG | OXYGEN SATURATION: 95 % | DIASTOLIC BLOOD PRESSURE: 90 MMHG | RESPIRATION RATE: 16 BRPM | HEIGHT: 65 IN

## 2017-12-01 DIAGNOSIS — Z86.73 HISTORY OF CVA (CEREBROVASCULAR ACCIDENT): ICD-10-CM

## 2017-12-01 DIAGNOSIS — R00.2 INTERMITTENT PALPITATIONS: ICD-10-CM

## 2017-12-01 DIAGNOSIS — M17.12 PRIMARY OSTEOARTHRITIS OF LEFT KNEE: ICD-10-CM

## 2017-12-01 DIAGNOSIS — E03.9 HYPOTHYROIDISM, UNSPECIFIED TYPE: ICD-10-CM

## 2017-12-01 DIAGNOSIS — Z01.810 PREOP CARDIOVASCULAR EXAM: ICD-10-CM

## 2017-12-01 DIAGNOSIS — I10 ESSENTIAL HYPERTENSION: ICD-10-CM

## 2017-12-01 DIAGNOSIS — E11.9 NON-INSULIN TREATED TYPE 2 DIABETES MELLITUS (HCC): ICD-10-CM

## 2017-12-01 PROCEDURE — 99213 OFFICE O/P EST LOW 20 MIN: CPT | Performed by: INTERNAL MEDICINE

## 2017-12-01 ASSESSMENT — PATIENT HEALTH QUESTIONNAIRE - PHQ9: CLINICAL INTERPRETATION OF PHQ2 SCORE: 0

## 2017-12-01 NOTE — TELEPHONE ENCOUNTER
ANNUAL WELLNESS VISIT PRE-VISIT PLANNING     1.  Reviewed note from last office visit with PCP: YES    2.  If any orders were placed at last visit, do we have Results/Consult Notes?        •  Labs - Labs ordered, but not to be completed until 03/30/18.   Note: If patient appointment is for lab review and patient did not complete labs, check with provider if OK to reschedule patient until labs completed.       •  Imaging - Imaging ordered, completed and results are in chart.       •  Referrals - No referrals were ordered at last office visit.    3.  Immunizations were updated in Epic using WebIZ?: No WebIZ record       •  WebIZ Recommendations: UNKNOWN       •  Is patient due for Tdap? NO       •  Is patient due for Shingles? NO     4.  Patient is due for the following Health Maintenance Topics:   Health Maintenance Due   Topic Date Due   • Annual Wellness Visit  1940   • RETINAL SCREENING  03/18/1956   • FASTING LIPID PROFILE  04/10/2014   • DIABETES MONOFILAMENT / LE EXAM  11/14/2014   • URINE ACR / MICROALBUMIN  09/19/2017       - Patient declines Immunizations: FLU, PNEUMOVAX (PPSV23), PREVNAR (PCV13) , TDAP and ZOSTAVAX (Shingles).      5.  Reviewed/Updated the following with patient:       •   Preferred Pharmacy? YES       •   Preferred Lab? YES       •   Preferred Communication? YES       •   Allergies? YES       •   Medications? YES. Was Abstract Encounter opened and chart updated? YES       •   Social History? YES. Was Abstract Encounter opened and chart updated? YES       •   Family History (document living status of immediate family members and if + hx of cancer, diabetes, hypertension, hyperlipidemia, heart attack, stroke) YES. Was Abstract Encounter opened and chart updated? YES    6.  Care Team Updated:       •   DME Company (gait device, O2, CPAP, etc.): YES [employer]       •   Other Specialists (eye doctor, derm, GYN, cardiology, endo, etc): NO    7.  Patient has the following  Care Path diagnoses on Problem List:  NONE    8.  Specialty Comments was updated with diagnosis information provided by SCP: NO    9.  Patient was advised: “This is a free wellness visit. The provider will screen for medical conditions to help you stay healthy. If you have other concerns to address you may be asked to discuss these at a separate visit or there may be an additional fee.”     6.  Patient was informed to arrive 15 min prior to their scheduled appointment and bring in their medication bottles.

## 2017-12-02 NOTE — PROGRESS NOTES
Subjective:  Bailey is a 77 y.o. female with the following   Past Medical History:   Diagnosis Date   • Anesthesia     woke up during foot surgery   • Arthritis    • Blood transfusion    • Bronchitis 2011   • Carotid stenosis    • CATARACT     repaired   • CVA (cerebral vascular accident) (CMS-HCC)    • Diabetes     off metformin diet controlled   • Fibromyalgia    • Fibromyalgia    • GERD (gastroesophageal reflux disease)    • Heart burn    • Herpes genital    • Hiatus hernia syndrome    • HTN    • Indigestion    • Insomnia    • Migraine    • Pain    • Seizure (CMS-HCC)     last seizure 2010   • Thyroid disease    • TIA     multiple   • Unspecified hemorrhagic conditions     on Plavix   • Unspecified urinary incontinence    • Urinary bladder disorder     incontinence      Family History   Problem Relation Age of Onset   • Heart Disease Father    • Cancer Maternal Grandmother      The patient is on the following medications:   Current Outpatient Prescriptions:   •  glipiZIDE (GLUCOTROL) 5 MG Tab, TAKE 1/2 TABLET TWICE DAILY, Disp: 90 Tab, Rfl: 3  •  clopidogrel (PLAVIX) 75 MG Tab, TAKE 1 TABLET EVERY DAY, Disp: 90 Tab, Rfl: 3  •  omeprazole (PRILOSEC) 20 MG delayed-release capsule, TAKE 1 CAPSULE EVERY DAY, Disp: 90 Cap, Rfl: 3  •  amlodipine (NORVASC) 5 MG Tab, TAKE 1 TABLET EVERY DAY, Disp: 90 Tab, Rfl: 3  •  acyclovir (ZOVIRAX) 200 MG Cap, Take 1 Cap by mouth every day., Disp: 90 Cap, Rfl: 3  •  levothyroxine (SYNTHROID) 75 MCG Tab, Take 1 Tab by mouth Every morning on an empty stomach., Disp: 90 Tab, Rfl: 3  •  Blood Glucose Monitoring Suppl Device, Meter: Dispense Device of Insurance Preference. Sig. Use as directed for blood sugar monitoring. #1. NR., Disp: 1 Device, Rfl: 0  •  docusate sodium (COLACE) 100 MG Cap, Take 1 Cap by mouth 2 times a day., Disp: 60 Cap, Rfl: 3  •  glycerin, laxative, 2 GM Suppos, Insert 1 Suppository in rectum 1 time daily as needed for Constipation., Disp: 10 Suppository, Rfl: 0  •   "fluticasone (FLONASE) 50 MCG/ACT nasal spray, Spray 1 Spray in nose every day. Each Nostril, Disp: 16 g, Rfl: 1  •  acetaminophen (TYLENOL) 500 MG TABS, Take 500-1,000 mg by mouth every 6 hours as needed., Disp: , Rfl:   •  Cholecalciferol (VITAMIN D) 2000 UNITS CAPS, Take  by mouth every day., Disp: , Rfl:   •  Non Formulary Request, as needed. OTC Restless leg, Disp: , Rfl:     HPI; patient is here today requesting referral to cardiology for preop examination, she has had consultation with Dr. Jaimes  orthopedist for total left knee replacement, she will possibly have the surgery in January 2018. Patient also concerned about recurrent heart palpitation lasting for a few minutes every couple of days, currently on levothyroxine for hypothyroidism denies having cold intolerance or fatigue, on amlodipine for hypertension denies having lower extremity edema and on glipizide for diabetes denies having polyuria or polydipsia, Plavix for history of CVA denies having motor or sensory loss..        ROS: All other systems reviewed and they are negative.    Blood pressure 130/90, pulse 84, temperature 36.4 °C (97.5 °F), resp. rate 16, height 1.638 m (5' 4.5\"), weight 80.2 kg (176 lb 12.8 oz), last menstrual period 01/01/1983, SpO2 95 %, not currently breastfeeding.on RA        Objective:      Patient is well appearing and in no acute distress.  Eyes; pupils are equally reactive to light and accommodation. Ears are clear, no tympanic membranes bulging. Pharynx is clear.  Neck is soft and supple, nontender,no thyromegaly or mass.  lymphatic;  no cervical or supraclavicular lymphadenopathy.  Respiratory;  Lungs clear to auscultation bilaterally with normal respiratory effort. Gastrointestinal; abdomen is soft, non-tender on palpation,not distended. Cardiovascular ; Heart regular rate and rhythm without murmur, no JVD.  Extremities without any clubbing, cyanosis, or edema. Neuro - psychiatric ;  alert and oriented to time, " location and person, motor and sensory intact. Skin; no rash or erythema. Musculoskeletal; no tenderness on palpation, no joint swelling or erythema    Assessment ;   1. Left knee severe arthritis; status post orthopedic consultation, plan for total knee replacement in January 2018, has had recurrent heart palpitation, currently is asymptomatic, also on levothyroxine for hypothyroidism, glipizide for diabetes, amlodipine for hypertension, Plavix for history of CVA denies having motor or sensory loss..   She had normal cardiac stress test in 2014.       Plan; Patient is advised on preventive and supportive care regarding recurrent heart palpitation and potential underlying etiologies, referral to cardiology for preop examination and possible Holter monitor to rule out paroxysmal cardiac arrhythmias. We'll monitor thyroid function test.     Please note that this dictation was created using voice recognition software. I have worked with consultants from the vendor as well as technical experts from [facility] [employer] to optimize the interface. I have made every reasonable attempt to correct obvious errors, but I expect that there are errors of grammar and possibly content that I did not discover before finalizing the note.

## 2017-12-06 ENCOUNTER — OFFICE VISIT (OUTPATIENT)
Dept: MEDICAL GROUP | Facility: PHYSICIAN GROUP | Age: 77
End: 2017-12-06
Payer: MEDICARE

## 2017-12-06 VITALS
WEIGHT: 176.37 LBS | TEMPERATURE: 98.1 F | DIASTOLIC BLOOD PRESSURE: 88 MMHG | SYSTOLIC BLOOD PRESSURE: 122 MMHG | RESPIRATION RATE: 16 BRPM | OXYGEN SATURATION: 94 % | BODY MASS INDEX: 31.25 KG/M2 | HEIGHT: 63 IN | HEART RATE: 70 BPM

## 2017-12-06 DIAGNOSIS — Z86.73 HISTORY OF CVA (CEREBROVASCULAR ACCIDENT): ICD-10-CM

## 2017-12-06 DIAGNOSIS — E55.9 VITAMIN D DEFICIENCY: ICD-10-CM

## 2017-12-06 DIAGNOSIS — E11.9 DIABETES MELLITUS TYPE 2, NONINSULIN DEPENDENT (HCC): ICD-10-CM

## 2017-12-06 DIAGNOSIS — E66.9 OBESITY (BMI 30-39.9): ICD-10-CM

## 2017-12-06 DIAGNOSIS — E03.9 HYPOTHYROIDISM, UNSPECIFIED TYPE: ICD-10-CM

## 2017-12-06 DIAGNOSIS — K21.9 GASTROESOPHAGEAL REFLUX DISEASE WITHOUT ESOPHAGITIS: ICD-10-CM

## 2017-12-06 DIAGNOSIS — I10 ESSENTIAL HYPERTENSION: ICD-10-CM

## 2017-12-06 PROCEDURE — G0439 PPPS, SUBSEQ VISIT: HCPCS | Performed by: INTERNAL MEDICINE

## 2017-12-06 ASSESSMENT — PATIENT HEALTH QUESTIONNAIRE - PHQ9: CLINICAL INTERPRETATION OF PHQ2 SCORE: 0

## 2017-12-18 ENCOUNTER — OFFICE VISIT (OUTPATIENT)
Dept: CARDIOLOGY | Facility: MEDICAL CENTER | Age: 77
End: 2017-12-18
Payer: MEDICARE

## 2017-12-18 VITALS
HEART RATE: 76 BPM | SYSTOLIC BLOOD PRESSURE: 132 MMHG | HEIGHT: 63 IN | WEIGHT: 177.6 LBS | DIASTOLIC BLOOD PRESSURE: 80 MMHG | OXYGEN SATURATION: 92 % | BODY MASS INDEX: 31.47 KG/M2

## 2017-12-18 DIAGNOSIS — Z01.810 PRE-OPERATIVE CARDIOVASCULAR EXAMINATION: ICD-10-CM

## 2017-12-18 DIAGNOSIS — I20.89 STABLE ANGINA PECTORIS: ICD-10-CM

## 2017-12-18 DIAGNOSIS — I10 ESSENTIAL HYPERTENSION: ICD-10-CM

## 2017-12-18 DIAGNOSIS — E11.9 DIABETES MELLITUS TYPE 2, NONINSULIN DEPENDENT (HCC): ICD-10-CM

## 2017-12-18 DIAGNOSIS — I63.9 CEREBROVASCULAR ACCIDENT (CVA), UNSPECIFIED MECHANISM (HCC): ICD-10-CM

## 2017-12-18 DIAGNOSIS — R00.2 PALPITATIONS: ICD-10-CM

## 2017-12-18 PROBLEM — I25.9 CHEST PAIN DUE TO MYOCARDIAL ISCHEMIA: Status: ACTIVE | Noted: 2017-12-18

## 2017-12-18 PROCEDURE — 99204 OFFICE O/P NEW MOD 45 MIN: CPT | Performed by: INTERNAL MEDICINE

## 2017-12-18 ASSESSMENT — ENCOUNTER SYMPTOMS
PALPITATIONS: 0
SHORTNESS OF BREATH: 0
FEVER: 0
STRIDOR: 0
CLAUDICATION: 0
CARDIOVASCULAR NEGATIVE: 1
WEAKNESS: 0
DIZZINESS: 0
SPUTUM PRODUCTION: 0
SORE THROAT: 0
ORTHOPNEA: 0
NEUROLOGICAL NEGATIVE: 1
LOSS OF CONSCIOUSNESS: 0
CHILLS: 0
PND: 0
COUGH: 0
WHEEZING: 0
HEMOPTYSIS: 0
BRUISES/BLEEDS EASILY: 0
GASTROINTESTINAL NEGATIVE: 1
MUSCULOSKELETAL NEGATIVE: 1
EYES NEGATIVE: 1
RESPIRATORY NEGATIVE: 1
CONSTITUTIONAL NEGATIVE: 1

## 2017-12-18 NOTE — LETTER
[facility]   [address] [place] [address]  [place] [address]  Phone: [phone]  Fax: [phone]              Bailey White  1940    Encounter Date: 12/18/2017    Eliceo May M.D.          PROGRESS NOTE:  Subjective:   Bailey White is a 77 y.o. female who presents today for preoperative stratification prior to knee replacement surgery. Just past history of a CVA with no diagnosis found for this. She takes Plavix daily for this reason. She also says she has palpitations where at times her heart will race uncontrollably for seconds to minutes at a time but is never persisted more than an hour. She has no chest pain or lower extremity edema her blood pressures well controlled.    Past Medical History:   Diagnosis Date   • Anesthesia     woke up during foot surgery   • Arthritis    • Blood transfusion    • Bronchitis 2011   • Carotid stenosis    • CATARACT     repaired   • CVA (cerebral vascular accident) (CMS-HCC)    • Diabetes     off metformin diet controlled   • Fibromyalgia    • Fibromyalgia    • GERD (gastroesophageal reflux disease)    • Heart burn    • Herpes genital    • Hiatus hernia syndrome    • HTN    • Indigestion    • Insomnia    • Migraine    • Pain    • Seizure (CMS-HCC)     last seizure 2010   • Thyroid disease    • TIA     multiple   • Unspecified hemorrhagic conditions     on Plavix   • Unspecified urinary incontinence    • Urinary bladder disorder     incontinence     Past Surgical History:   Procedure Laterality Date   • HYSTEROSCOPY WITH VIDEO DIAGNOSTIC  9/5/2013    Performed by Portillo Barragan M.D. at SURGERY SAME DAY [facility] ORS   • DILATION AND CURETTAGE  9/5/2013    Performed by Portillo Barragan M.D. at SURGERY SAME DAY [facility] ORS   • OTHER      cataracts   • OTHER ORTHOPEDIC SURGERY     • TONSILLECTOMY       Family History   Problem Relation Age of Onset   • Heart Disease Father    • Cancer Maternal  Grandmother      History   Smoking Status   • Former Smoker   • Packs/day: 3.00   • Years: 1.00   • Types: Cigarettes   • Quit date: 1/1/1955   Smokeless Tobacco   • Never Used     Allergies   Allergen Reactions   • Asa [Aspirin]      GI bleeding   • Ciprofloxacin Rash   • Clindamycin Hives   • Codeine Vomiting   • Food Anxiety     Yellow dye, peanuts and peanut products, all peppers,tomato, potato,wheat, gluten   • Hydrocortisone      Suicidal after Cortisone injection   • Ibuprofen      Abdominal pain   • Imitrex [Sumatriptan Succinate] Vomiting   • Inderal [Propranolol Hcl]      Not sure of reaction   • Lyrica      suicidal   • Mellaril      Psychotic episode   • Other Drug      All mycins,cillins,and cyclines   • Pcn [Penicillins] Hives   • Ropinirole Hcl [Requip] Shortness of Breath   • Shellfish Allergy      headaches   • Sulfa Drugs Itching   • Tagamet [Cimetidine]      Mental confusion     Outpatient Encounter Prescriptions as of 12/18/2017   Medication Sig Dispense Refill   • glipiZIDE (GLUCOTROL) 5 MG Tab TAKE 1/2 TABLET TWICE DAILY 90 Tab 3   • clopidogrel (PLAVIX) 75 MG Tab TAKE 1 TABLET EVERY DAY 90 Tab 3   • omeprazole (PRILOSEC) 20 MG delayed-release capsule TAKE 1 CAPSULE EVERY DAY 90 Cap 3   • amlodipine (NORVASC) 5 MG Tab TAKE 1 TABLET EVERY DAY 90 Tab 3   • acyclovir (ZOVIRAX) 200 MG Cap Take 1 Cap by mouth every day. 90 Cap 3   • levothyroxine (SYNTHROID) 75 MCG Tab Take 1 Tab by mouth Every morning on an empty stomach. 90 Tab 3   • Blood Glucose Monitoring Suppl Device Meter: Dispense Device of Insurance Preference. Sig. Use as directed for blood sugar monitoring. #1. NR. 1 Device 0   • docusate sodium (COLACE) 100 MG Cap Take 1 Cap by mouth 2 times a day. 60 Cap 3   • glycerin, laxative, 2 GM Suppos Insert 1 Suppository in rectum 1 time daily as needed for Constipation. 10 Suppository 0   • fluticasone (FLONASE) 50 MCG/ACT nasal spray Spray 1 Spray in nose every day. Each Nostril 16 g 1   •  "acetaminophen (TYLENOL) 500 MG TABS Take 500-1,000 mg by mouth every 6 hours as needed.     • Cholecalciferol (VITAMIN D) 2000 UNITS CAPS Take  by mouth every day.     • Non Formulary Request as needed. OTC Restless leg       No facility-administered encounter medications on file as of 12/18/2017.      Review of Systems   Constitutional: Negative.  Negative for chills, fever and malaise/fatigue.   HENT: Negative.  Negative for sore throat.    Eyes: Negative.    Respiratory: Negative.  Negative for cough, hemoptysis, sputum production, shortness of breath, wheezing and stridor.    Cardiovascular: Negative.  Negative for chest pain, palpitations, orthopnea, claudication, leg swelling and PND.   Gastrointestinal: Negative.    Genitourinary: Negative.    Musculoskeletal: Negative.    Skin: Negative.    Neurological: Negative.  Negative for dizziness, loss of consciousness and weakness.   Endo/Heme/Allergies: Negative.  Does not bruise/bleed easily.   All other systems reviewed and are negative.       Objective:   /80   Pulse 76   Ht 1.6 m (5' 3\")   Wt 80.6 kg (177 lb 9.6 oz)   LMP 01/01/1983   SpO2 92%   BMI 31.46 kg/m²      Physical Exam   Constitutional: She is oriented to person, place, and time. She appears well-developed and well-nourished. No distress.   HENT:   Head: Normocephalic.   Mouth/Throat: Oropharynx is clear and moist.   Eyes: EOM are normal. Pupils are equal, round, and reactive to light. Right eye exhibits no discharge. Left eye exhibits no discharge. No scleral icterus.   Neck: Normal range of motion. Neck supple. No JVD present. No tracheal deviation present.   Cardiovascular: Normal rate, regular rhythm, S1 normal, S2 normal, normal heart sounds, intact distal pulses and normal pulses.  Exam reveals no gallop, no S3, no S4 and no friction rub.    No murmur heard.   No systolic murmur is present    No diastolic murmur is present   Pulses:       Carotid pulses are 2+ on the right side, and " 2+ on the left side.       Radial pulses are 2+ on the right side, and 2+ on the left side.        Dorsalis pedis pulses are 2+ on the right side, and 2+ on the left side.        Posterior tibial pulses are 2+ on the right side, and 2+ on the left side.   Pulmonary/Chest: Effort normal and breath sounds normal. No respiratory distress. She has no wheezes. She has no rales.   Abdominal: Soft. Bowel sounds are normal. She exhibits no distension and no mass. There is no tenderness. There is no rebound and no guarding.   Musculoskeletal: She exhibits no edema.   Neurological: She is alert and oriented to person, place, and time. No cranial nerve deficit.   Skin: Skin is warm and dry. She is not diaphoretic. No pallor.   Psychiatric: She has a normal mood and affect. Her behavior is normal. Judgment and thought content normal.   Nursing note and vitals reviewed.      Assessment:     1. Essential hypertension  EKG    NM-CARDIAC STRESS TEST   2. Diabetes mellitus type 2, noninsulin dependent (CMS-HCC)  NM-CARDIAC STRESS TEST   3. Cerebrovascular accident (CVA), unspecified mechanism (CMS-HCC)  HOLTER MONITOR / EVENT RECORDER   4. Palpitations  HOLTER MONITOR / EVENT RECORDER   5. Stable angina pectoris (CMS-HCC)     6. Pre-operative cardiovascular examination  NM-CARDIAC STRESS TEST       Medical Decision Making:  Today's Assessment / Status / Plan:     77-year-old female with preop  With no good indication of her functional capacity. We'll schedule her for nuclear stress test. Additionally I will get a Ziopatch to see we can detect any atrial fibrillation. I will see her back in 3 months but call with results of the testing.    1. Pre-op    - nuc stress test    2. CVA/Palp    - Ziopatch    - cont clopidogrel     Thank for you allowing me to take part in your patient's care, please call should you have any questions or would like to discuss this patient.      Dave Clemons M.D.  [id number]  [place]  [id number]  VIA In Basket

## 2017-12-19 NOTE — PROGRESS NOTES
Subjective:   Bailey White is a 77 y.o. female who presents today for preoperative stratification prior to knee replacement surgery. Just past history of a CVA with no diagnosis found for this. She takes Plavix daily for this reason. She also says she has palpitations where at times her heart will race uncontrollably for seconds to minutes at a time but is never persisted more than an hour. She has no chest pain or lower extremity edema her blood pressures well controlled.    Past Medical History:   Diagnosis Date   • Anesthesia     woke up during foot surgery   • Arthritis    • Blood transfusion    • Bronchitis 2011   • Carotid stenosis    • CATARACT     repaired   • CVA (cerebral vascular accident) (CMS-HCC)    • Diabetes     off metformin diet controlled   • Fibromyalgia    • Fibromyalgia    • GERD (gastroesophageal reflux disease)    • Heart burn    • Herpes genital    • Hiatus hernia syndrome    • HTN    • Indigestion    • Insomnia    • Migraine    • Pain    • Seizure (CMS-HCC)     last seizure 2010   • Thyroid disease    • TIA     multiple   • Unspecified hemorrhagic conditions     on Plavix   • Unspecified urinary incontinence    • Urinary bladder disorder     incontinence     Past Surgical History:   Procedure Laterality Date   • HYSTEROSCOPY WITH VIDEO DIAGNOSTIC  9/5/2013    Performed by Portillo Barragan M.D. at SURGERY SAME DAY [facility]S   • DILATION AND CURETTAGE  9/5/2013    Performed by Portillo Barragan M.D. at SURGERY SAME DAY [facility] ORS   • OTHER      cataracts   • OTHER ORTHOPEDIC SURGERY     • TONSILLECTOMY       Family History   Problem Relation Age of Onset   • Heart Disease Father    • Cancer Maternal Grandmother      History   Smoking Status   • Former Smoker   • Packs/day: 3.00   • Years: 1.00   • Types: Cigarettes   • Quit date: 1/1/1955   Smokeless Tobacco   • Never Used     Allergies   Allergen Reactions   • Asa [Aspirin]      GI bleeding   • Ciprofloxacin Rash   •  Clindamycin Hives   • Codeine Vomiting   • Food Anxiety     Yellow dye, peanuts and peanut products, all peppers,tomato, potato,wheat, gluten   • Hydrocortisone      Suicidal after Cortisone injection   • Ibuprofen      Abdominal pain   • Imitrex [Sumatriptan Succinate] Vomiting   • Inderal [Propranolol Hcl]      Not sure of reaction   • Lyrica      suicidal   • Mellaril      Psychotic episode   • Other Drug      All mycins,cillins,and cyclines   • Pcn [Penicillins] Hives   • Ropinirole Hcl [Requip] Shortness of Breath   • Shellfish Allergy      headaches   • Sulfa Drugs Itching   • Tagamet [Cimetidine]      Mental confusion     Outpatient Encounter Prescriptions as of 12/18/2017   Medication Sig Dispense Refill   • glipiZIDE (GLUCOTROL) 5 MG Tab TAKE 1/2 TABLET TWICE DAILY 90 Tab 3   • clopidogrel (PLAVIX) 75 MG Tab TAKE 1 TABLET EVERY DAY 90 Tab 3   • omeprazole (PRILOSEC) 20 MG delayed-release capsule TAKE 1 CAPSULE EVERY DAY 90 Cap 3   • amlodipine (NORVASC) 5 MG Tab TAKE 1 TABLET EVERY DAY 90 Tab 3   • acyclovir (ZOVIRAX) 200 MG Cap Take 1 Cap by mouth every day. 90 Cap 3   • levothyroxine (SYNTHROID) 75 MCG Tab Take 1 Tab by mouth Every morning on an empty stomach. 90 Tab 3   • Blood Glucose Monitoring Suppl Device Meter: Dispense Device of Insurance Preference. Sig. Use as directed for blood sugar monitoring. #1. NR. 1 Device 0   • docusate sodium (COLACE) 100 MG Cap Take 1 Cap by mouth 2 times a day. 60 Cap 3   • glycerin, laxative, 2 GM Suppos Insert 1 Suppository in rectum 1 time daily as needed for Constipation. 10 Suppository 0   • fluticasone (FLONASE) 50 MCG/ACT nasal spray Spray 1 Spray in nose every day. Each Nostril 16 g 1   • acetaminophen (TYLENOL) 500 MG TABS Take 500-1,000 mg by mouth every 6 hours as needed.     • Cholecalciferol (VITAMIN D) 2000 UNITS CAPS Take  by mouth every day.     • Non Formulary Request as needed. OTC Restless leg       No facility-administered encounter medications on  "file as of 12/18/2017.      Review of Systems   Constitutional: Negative.  Negative for chills, fever and malaise/fatigue.   HENT: Negative.  Negative for sore throat.    Eyes: Negative.    Respiratory: Negative.  Negative for cough, hemoptysis, sputum production, shortness of breath, wheezing and stridor.    Cardiovascular: Negative.  Negative for chest pain, palpitations, orthopnea, claudication, leg swelling and PND.   Gastrointestinal: Negative.    Genitourinary: Negative.    Musculoskeletal: Negative.    Skin: Negative.    Neurological: Negative.  Negative for dizziness, loss of consciousness and weakness.   Endo/Heme/Allergies: Negative.  Does not bruise/bleed easily.   All other systems reviewed and are negative.       Objective:   /80   Pulse 76   Ht 1.6 m (5' 3\")   Wt 80.6 kg (177 lb 9.6 oz)   LMP 01/01/1983   SpO2 92%   BMI 31.46 kg/m²     Physical Exam   Constitutional: She is oriented to person, place, and time. She appears well-developed and well-nourished. No distress.   HENT:   Head: Normocephalic.   Mouth/Throat: Oropharynx is clear and moist.   Eyes: EOM are normal. Pupils are equal, round, and reactive to light. Right eye exhibits no discharge. Left eye exhibits no discharge. No scleral icterus.   Neck: Normal range of motion. Neck supple. No JVD present. No tracheal deviation present.   Cardiovascular: Normal rate, regular rhythm, S1 normal, S2 normal, normal heart sounds, intact distal pulses and normal pulses.  Exam reveals no gallop, no S3, no S4 and no friction rub.    No murmur heard.   No systolic murmur is present    No diastolic murmur is present   Pulses:       Carotid pulses are 2+ on the right side, and 2+ on the left side.       Radial pulses are 2+ on the right side, and 2+ on the left side.        Dorsalis pedis pulses are 2+ on the right side, and 2+ on the left side.        Posterior tibial pulses are 2+ on the right side, and 2+ on the left side.   Pulmonary/Chest: " Effort normal and breath sounds normal. No respiratory distress. She has no wheezes. She has no rales.   Abdominal: Soft. Bowel sounds are normal. She exhibits no distension and no mass. There is no tenderness. There is no rebound and no guarding.   Musculoskeletal: She exhibits no edema.   Neurological: She is alert and oriented to person, place, and time. No cranial nerve deficit.   Skin: Skin is warm and dry. She is not diaphoretic. No pallor.   Psychiatric: She has a normal mood and affect. Her behavior is normal. Judgment and thought content normal.   Nursing note and vitals reviewed.      Assessment:     1. Essential hypertension  EKG    NM-CARDIAC STRESS TEST   2. Diabetes mellitus type 2, noninsulin dependent (CMS-[facility])  NM-CARDIAC STRESS TEST   3. Cerebrovascular accident (CVA), unspecified mechanism (CMS-[facility])  HOLTER MONITOR / EVENT RECORDER   4. Palpitations  HOLTER MONITOR / EVENT RECORDER   5. Stable angina pectoris (CMS-HCC)     6. Pre-operative cardiovascular examination  NM-CARDIAC STRESS TEST       Medical Decision Making:  Today's Assessment / Status / Plan:     77-year-old female with preop  With no good indication of her functional capacity. We'll schedule her for nuclear stress test. Additionally I will get a Ziopatch to see we can detect any atrial fibrillation. I will see her back in 3 months but call with results of the testing.    1. Pre-op    - nuc stress test    2. CVA/Palp    - Ziopatch    - cont clopidogrel     Thank for you allowing me to take part in your patient's care, please call should you have any questions or would like to discuss this patient.

## 2017-12-29 ENCOUNTER — NON-PROVIDER VISIT (OUTPATIENT)
Dept: CARDIOLOGY | Facility: MEDICAL CENTER | Age: 77
End: 2017-12-29
Attending: INTERNAL MEDICINE
Payer: MEDICARE

## 2017-12-29 ENCOUNTER — TELEPHONE (OUTPATIENT)
Dept: CARDIOLOGY | Facility: MEDICAL CENTER | Age: 77
End: 2017-12-29

## 2017-12-29 ENCOUNTER — HOSPITAL ENCOUNTER (OUTPATIENT)
Dept: RADIOLOGY | Facility: MEDICAL CENTER | Age: 77
End: 2017-12-29
Attending: INTERNAL MEDICINE
Payer: MEDICARE

## 2017-12-29 DIAGNOSIS — Z01.810 PRE-OPERATIVE CARDIOVASCULAR EXAMINATION: ICD-10-CM

## 2017-12-29 DIAGNOSIS — R00.2 PALPITATIONS: ICD-10-CM

## 2017-12-29 DIAGNOSIS — I10 ESSENTIAL HYPERTENSION: ICD-10-CM

## 2017-12-29 DIAGNOSIS — I63.9 CEREBROVASCULAR ACCIDENT (CVA), UNSPECIFIED MECHANISM (HCC): ICD-10-CM

## 2017-12-29 DIAGNOSIS — E11.9 DIABETES MELLITUS TYPE 2, NONINSULIN DEPENDENT (HCC): ICD-10-CM

## 2017-12-29 PROCEDURE — A9502 TC99M TETROFOSMIN: HCPCS

## 2017-12-29 PROCEDURE — 700111 HCHG RX REV CODE 636 W/ 250 OVERRIDE (IP)

## 2017-12-29 RX ORDER — REGADENOSON 0.08 MG/ML
INJECTION, SOLUTION INTRAVENOUS
Status: COMPLETED
Start: 2017-12-29 | End: 2017-12-29

## 2017-12-29 RX ADMIN — REGADENOSON 0.4 MG: 0.08 INJECTION, SOLUTION INTRAVENOUS at 10:45

## 2018-01-10 ENCOUNTER — TELEPHONE (OUTPATIENT)
Dept: CARDIOLOGY | Facility: MEDICAL CENTER | Age: 78
End: 2018-01-10

## 2018-01-10 NOTE — TELEPHONE ENCOUNTER
NM-CARDIAC STRESS TEST   Order: [id number]   Status:  Final result   Visible to patient:  Yes ([facility]jos[facility]) Dx:  Pre-operative cardiovascular examinat...   Notes Recorded by Eliceo May M.D. on 1/9/2018 at 7:17 AM PST  Please let them know their stress test is normal  ------    Notes Recorded by Nita Giordano R.N. on 1/8/2018 at 9:27 AM PST  Pt scheduled to see you 3/13     My Chart Message sent.

## 2018-01-17 ENCOUNTER — TELEPHONE (OUTPATIENT)
Dept: CARDIOLOGY | Facility: MEDICAL CENTER | Age: 78
End: 2018-01-17

## 2018-01-17 PROCEDURE — 0296T PR EXT ECG > 48HR TO 21 DAY RCRD W/CONECT INTL RCRD: CPT | Performed by: INTERNAL MEDICINE

## 2018-01-17 PROCEDURE — 0298T PR EXT ECG > 48HR TO 21 DAY REVIEW AND INTERPRETATN: CPT | Performed by: INTERNAL MEDICINE

## 2018-01-29 ENCOUNTER — TELEPHONE (OUTPATIENT)
Dept: MEDICAL GROUP | Facility: PHYSICIAN GROUP | Age: 78
End: 2018-01-29

## 2018-01-29 ENCOUNTER — TELEPHONE (OUTPATIENT)
Dept: CARDIOLOGY | Facility: MEDICAL CENTER | Age: 78
End: 2018-01-29

## 2018-01-30 ENCOUNTER — OFFICE VISIT (OUTPATIENT)
Dept: MEDICAL GROUP | Facility: PHYSICIAN GROUP | Age: 78
End: 2018-01-30
Payer: MEDICARE

## 2018-01-30 VITALS
DIASTOLIC BLOOD PRESSURE: 80 MMHG | OXYGEN SATURATION: 95 % | HEART RATE: 72 BPM | WEIGHT: 175.04 LBS | TEMPERATURE: 97.6 F | BODY MASS INDEX: 31.02 KG/M2 | SYSTOLIC BLOOD PRESSURE: 120 MMHG | HEIGHT: 63 IN

## 2018-01-30 DIAGNOSIS — R00.2 PALPITATIONS: ICD-10-CM

## 2018-01-30 DIAGNOSIS — E78.5 DYSLIPIDEMIA: ICD-10-CM

## 2018-01-30 DIAGNOSIS — M17.12 PRIMARY OSTEOARTHRITIS OF LEFT KNEE: ICD-10-CM

## 2018-01-30 DIAGNOSIS — I10 ESSENTIAL HYPERTENSION: ICD-10-CM

## 2018-01-30 DIAGNOSIS — E11.9 DIABETES MELLITUS WITHOUT COMPLICATION (HCC): ICD-10-CM

## 2018-01-30 DIAGNOSIS — Z01.818 PREOPERATIVE CLEARANCE: ICD-10-CM

## 2018-01-30 LAB — EKG IMPRESSION: NORMAL

## 2018-01-30 PROCEDURE — 93000 ELECTROCARDIOGRAM COMPLETE: CPT | Performed by: INTERNAL MEDICINE

## 2018-01-30 PROCEDURE — 99214 OFFICE O/P EST MOD 30 MIN: CPT | Performed by: FAMILY MEDICINE

## 2018-01-30 NOTE — TELEPHONE ENCOUNTER
----- Message -----   From: Kat Cordova   Sent: 1/29/2018   3:22 PM   To: Nita Giordano R.N.   Subject: Patient wants to get test results                 RO/Nita     Patient wants to get the results of her Stress Test and heart monitor and can be reached at [phone].     =====================================================      Contacted patient to discuss results.  Patient claims she saw results already on MyChart.  Patient inquired about future surgery clearance and explained that the surgeons office can fax over a clearance form.  Patient acknowledges understanding.  She states no rush, it hasn't been scheduled yet.  Patient denies any other questions or concerns.

## 2018-01-30 NOTE — TELEPHONE ENCOUNTER
VOICEMAIL  1. Caller Name: Bailey                   Call Back Number: [phone] (home)     2. Message: Patient LVM asking if she has been cleared for knee surgery.      3. Patient approves office to leave a detailed voicemail/MyChart message: yes

## 2018-01-31 NOTE — PROGRESS NOTES
Subjective:      Bailey White is a 77 y.o. female who presents with Medical Clearance (surgery, knee replacement) and Referral Needed            HPI     This is a 77-year-old white female patient of Dr. Clemons who is here for preoperative risk stratification/preoperative clearance for the left total knee replacement due to severe osteoarthritis. When patient was seen by Dr. Clemons on 12/1/17 for preop clearance she was complaining of palpitations so she was subsequently referred to the cardiologist for evaluation. She was seen and evaluated by Dr. May, cardiologist and she was sent for myocardial perfusion stress test which came back no evidence of prior MI or ischemia with ejection fraction of 72%. She was also sent Zio patch/heart monitor to evaluate for the palpitations which came back normal.    Patient has no prior history of CAD, arrhythmias, chronic lung disease. She denies any chest pain, shortness of breath, leg edema. She continues have a palpitations.    Patient has history of diabetes mellitus type 2 on glipizide. The last hemoglobin A1c was 9.3 in September 2017. Hemoglobin A1c: Based on patient's age is 8%.    Patient has dyslipidemia but not on statin. The last lipid panel was in April 2013 with total cholesterol of 226, triglycerides 90, HDL 51 and .    Patient has hypertension for which he takes amlodipine. He is not on ACE inhibitor. I reviewed his chart and his urine microalbumin in September 2016 came back normal.    Patient has history of strokes with the last one in 2012 without any residual deficits. Patient takes Plavix.    I reviewed the following    Past Medical History:   Diagnosis Date   • Anesthesia     woke up during foot surgery   • Arthritis    • Blood transfusion    • Bronchitis 2011   • Carotid stenosis    • CATARACT     repaired   • CVA (cerebral vascular accident) (CMS-HCC)    • Diabetes     off metformin diet controlled   • Fibromyalgia    • Fibromyalgia     • GERD (gastroesophageal reflux disease)    • Heart burn    • Herpes genital    • Hiatus hernia syndrome    • HTN    • Indigestion    • Insomnia    • Migraine    • Pain    • Seizure (CMS-[facility])     last seizure 2010   • Thyroid disease    • TIA     multiple   • Unspecified hemorrhagic conditions     on Plavix   • Unspecified urinary incontinence    • Urinary bladder disorder     incontinence        Past Surgical History:   Procedure Laterality Date   • HYSTEROSCOPY WITH VIDEO DIAGNOSTIC  9/5/2013    Performed by Portillo Barragan M.D. at SURGERY SAME DAY [facility]   • DILATION AND CURETTAGE  9/5/2013    Performed by Portillo Barragan M.D. at SURGERY SAME DAY [facility]   • OTHER      cataracts   • OTHER ORTHOPEDIC SURGERY     • TONSILLECTOMY         Allergies   Allergen Reactions   • Asa [Aspirin]      GI bleeding   • Ciprofloxacin Rash   • Clindamycin Hives   • Codeine Vomiting   • Food Anxiety     Yellow dye, peanuts and peanut products, all peppers,tomato, potato,wheat, gluten   • Hydrocortisone      Suicidal after Cortisone injection   • Ibuprofen      Abdominal pain   • Imitrex [Sumatriptan Succinate] Vomiting   • Inderal [Propranolol Hcl]      Not sure of reaction   • Lyrica      suicidal   • Mellaril      Psychotic episode   • Other Drug      All mycins,cillins,and cyclines   • Pcn [Penicillins] Hives   • Ropinirole Hcl [Requip] Shortness of Breath   • Shellfish Allergy      headaches   • Sulfa Drugs Itching   • Tagamet [Cimetidine]      Mental confusion       Current Outpatient Prescriptions   Medication Sig Dispense Refill   • glipiZIDE (GLUCOTROL) 5 MG Tab TAKE 1/2 TABLET TWICE DAILY 90 Tab 3   • clopidogrel (PLAVIX) 75 MG Tab TAKE 1 TABLET EVERY DAY 90 Tab 3   • omeprazole (PRILOSEC) 20 MG delayed-release capsule TAKE 1 CAPSULE EVERY DAY 90 Cap 3   • amlodipine (NORVASC) 5 MG Tab TAKE 1 TABLET EVERY DAY 90 Tab 3   • acyclovir (ZOVIRAX) 200 MG Cap Take 1 Cap by mouth every day. 90 Cap 3   •  levothyroxine (SYNTHROID) 75 MCG Tab Take 1 Tab by mouth Every morning on an empty stomach. 90 Tab 3   • fluticasone (FLONASE) 50 MCG/ACT nasal spray Spray 1 Spray in nose every day. Each Nostril 16 g 1   • acetaminophen (TYLENOL) 500 MG TABS Take 500-1,000 mg by mouth every 6 hours as needed.     • Cholecalciferol (VITAMIN D) 2000 UNITS CAPS Take  by mouth every day.     • Non Formulary Request as needed. OTC Restless leg     • Blood Glucose Monitoring Suppl Device Meter: Dispense Device of Insurance Preference. Sig. Use as directed for blood sugar monitoring. #1. NR. 1 Device 0     No current facility-administered medications for this visit.         Family History   Problem Relation Age of Onset   • Heart Disease Father    • Cancer Maternal Grandmother        Social History     Social History   • Marital status: Single     Spouse name: N/A   • Number of children: N/A   • Years of education: N/A     Occupational History   • Not on file.     Social History Main Topics   • Smoking status: Former Smoker     Packs/day: 3.00     Years: 1.00     Types: Cigarettes     Quit date: 1/1/1955   • Smokeless tobacco: Never Used   • Alcohol use No   • Drug use: No   • Sexual activity: No     Other Topics Concern   • Not on file     Social History Narrative   • No narrative on file            ROS     Review of Systems  Constitutional: Negative for fever, chills, weight loss and malaise/fatigue.   HEENT: Negative for ear pain, nosebleeds, congestion, sore throat and neck pain.    Eyes: Negative for blurred vision.   Respiratory: Negative for cough, sputum production, shortness of breath and wheezing.    Cardiovascular: Negative for chest pain, palpitations, orthopnea and leg swelling.   Gastrointestinal: Negative for heartburn, nausea, vomiting and abdominal pain.   Genitourinary: Negative for dysuria, urgency and frequency.   Musculoskeletal: Negative for myalgias, back pain. Positive bilateral knee pain  Skin: Negative for rash  "and itching.   Neurological: Negative for dizziness, tingling, tremors, sensory change, focal weakness and headaches.   Endo/Heme/Allergies: Does not bruise/bleed easily.   Psychiatric/Behavioral: Negative for depression, anxiety, or memory loss.     All other systems reviewed and are negative except as in HPI.         Objective:     /80   Pulse 72   Temp 36.4 °C (97.6 °F)   Ht 1.6 m (5' 3\")   Wt 79.4 kg (175 lb 0.7 oz)   LMP 01/01/1983   SpO2 95%   BMI 31.01 kg/m²      Physical Exam     Constitutional:  Alert, awake, oriented, not in distress    Skin:                 Warm, no rashes in visible areas    Head:               Atraumatic, normocephalic    Eyes:               Pupils equal, round and reactive to light, extraocular muscles movement                           intact, clear conjunctivae    Ears:               Tympanic membranes intact without evidence of infection    Nose:              No nasal discharge, no obstruction    Mouth:             No oral lesions    Throat:            Tonsils not enlarged, no exudates    Neck:              Trachea midline, supple, no lymphadenopathy, no thyromegaly    Lungs:             Clear to auscultation, no rales, no wheezes, no rhonchi    Heart:              Regular rate and rhythm, no murmur    Abdomen:       Good bowel sounds, soft, nontender, no hepatosplenomegaly, no masses    Extremities:    No edema, no clubbing, no cyanosis    Psych:            Alert and oriented x3, normal affect    Neuro:            Cranial nerves intact, Motor strength 5/5 upper and lower extremities,                                 DTRs 2+, sensation intact to light touch, negative Romberg       EKG done at cardiologist's office on 12/18/17 showed sinus rhythm rate of 71 without acute changes.         Assessment/Plan:     1. Preoperative clearance  He was seen and evaluated by the cardiologist with above-mentioned results of nuclear perfusion stress test and heartMonitor without any " abnormalities. Revised cardiac risk index  I based on history of stroke with mace risk of 0.9 which is low risk. I will get preop labs including chest x-ray and we will proceed with clearance after we get the results.  - LIPID PROFILE; Future  - COMP METABOLIC PANEL; Future  - HEMOGLOBIN A1C; Future  - CBC WITH DIFFERENTIAL; Future  - MICROALBUMIN CREAT RATIO URINE; Future  - VITAMIN D,25 HYDROXY; Future  - PROTHROMBIN TIME; Future  - APTT; Future  - TSH; Future  - DX-CHEST-2 VIEWS; Future    2. Palpitations   He was evaluated by the cardiologist. Had a heart monitor/zio patch which came back no abnormality. Plan the same as #1 and we will clear for surgery after we get the results.  - LIPID PROFILE; Future  - COMP METABOLIC PANEL; Future  - HEMOGLOBIN A1C; Future  - CBC WITH DIFFERENTIAL; Future  - MICROALBUMIN CREAT RATIO URINE; Future  - VITAMIN D,25 HYDROXY; Future  - PROTHROMBIN TIME; Future  - APTT; Future  - TSH; Future  - DX-CHEST-2 VIEWS; Future    3. Primary osteoarthritis of left knee  Patient will undergo left total knee replacement. She is low risk for surgery based on revised cardiac risk index. We will get preop labs and chest x-ray.  - LIPID PROFILE; Future  - COMP METABOLIC PANEL; Future  - HEMOGLOBIN A1C; Future  - CBC WITH DIFFERENTIAL; Future  - MICROALBUMIN CREAT RATIO URINE; Future  - VITAMIN D,25 HYDROXY; Future  - PROTHROMBIN TIME; Future  - APTT; Future  - TSH; Future  - DX-CHEST-2 VIEWS; Future    4. Diabetes mellitus without complication (CMS-HCC)  Considered under control. He will continue glipizide.  - LIPID PROFILE; Future  - COMP METABOLIC PANEL; Future  - HEMOGLOBIN A1C; Future  - CBC WITH DIFFERENTIAL; Future  - MICROALBUMIN CREAT RATIO URINE; Future  - VITAMIN D,25 HYDROXY; Future  - PROTHROMBIN TIME; Future  - APTT; Future  - TSH; Future  - DX-CHEST-2 VIEWS; Future    5. Dyslipidemia  He is not on statin. We will do updated lipid panel and treat with statin depending on results  according to the American College of cardiology/American Heart Association guidelines.  - LIPID PROFILE; Future  - COMP METABOLIC PANEL; Future  - HEMOGLOBIN A1C; Future  - CBC WITH DIFFERENTIAL; Future  - MICROALBUMIN CREAT RATIO URINE; Future  - VITAMIN D,25 HYDROXY; Future  - PROTHROMBIN TIME; Future  - APTT; Future  - TSH; Future  - DX-CHEST-2 VIEWS; Future    6. Essential hypertension  Controlled on amlodipine. Ideally he should be on ACE inhibitor. Prior urine microalbumin was normal. I will get urine microalbumin. Consider adding ACE inhibitor.  - LIPID PROFILE; Future  - COMP METABOLIC PANEL; Future  - HEMOGLOBIN A1C; Future  - CBC WITH DIFFERENTIAL; Future  - MICROALBUMIN CREAT RATIO URINE; Future  - VITAMIN D,25 HYDROXY; Future  - PROTHROMBIN TIME; Future  - APTT; Future  - TSH; Future  - DX-CHEST-2 VIEWS; Future      Please note that this dictation was created using voice recognition software. I have worked with consultants from the vendor as well as technical experts from [employer] to optimize the interface. I have made every reasonable attempt to correct obvious errors, but I expect that there are errors of grammar and possibly content I did not discover before finalizing the note.

## 2018-02-08 ENCOUNTER — HOSPITAL ENCOUNTER (OUTPATIENT)
Dept: LAB | Facility: MEDICAL CENTER | Age: 78
End: 2018-02-08
Attending: FAMILY MEDICINE
Payer: MEDICARE

## 2018-02-08 ENCOUNTER — HOSPITAL ENCOUNTER (OUTPATIENT)
Dept: RADIOLOGY | Facility: MEDICAL CENTER | Age: 78
End: 2018-02-08
Attending: FAMILY MEDICINE
Payer: MEDICARE

## 2018-02-08 DIAGNOSIS — E11.9 DIABETES MELLITUS WITHOUT COMPLICATION (HCC): ICD-10-CM

## 2018-02-08 DIAGNOSIS — R00.2 PALPITATIONS: ICD-10-CM

## 2018-02-08 DIAGNOSIS — Z01.818 PREOPERATIVE CLEARANCE: ICD-10-CM

## 2018-02-08 DIAGNOSIS — I10 ESSENTIAL HYPERTENSION: ICD-10-CM

## 2018-02-08 DIAGNOSIS — E78.5 DYSLIPIDEMIA: ICD-10-CM

## 2018-02-08 DIAGNOSIS — M17.12 PRIMARY OSTEOARTHRITIS OF LEFT KNEE: ICD-10-CM

## 2018-02-08 LAB
25(OH)D3 SERPL-MCNC: 33 NG/ML (ref 30–100)
ALBUMIN SERPL BCP-MCNC: 3.7 G/DL (ref 3.2–4.9)
ALBUMIN/GLOB SERPL: 1.2 G/DL
ALP SERPL-CCNC: 98 U/L (ref 30–99)
ALT SERPL-CCNC: 19 U/L (ref 2–50)
ANION GAP SERPL CALC-SCNC: 9 MMOL/L (ref 0–11.9)
APTT PPP: 35.2 SEC (ref 24.7–36)
AST SERPL-CCNC: 16 U/L (ref 12–45)
BASOPHILS # BLD AUTO: 0.4 % (ref 0–1.8)
BASOPHILS # BLD: 0.02 K/UL (ref 0–0.12)
BILIRUB SERPL-MCNC: 0.6 MG/DL (ref 0.1–1.5)
BUN SERPL-MCNC: 13 MG/DL (ref 8–22)
CALCIUM SERPL-MCNC: 9.3 MG/DL (ref 8.5–10.5)
CHLORIDE SERPL-SCNC: 107 MMOL/L (ref 96–112)
CHOLEST SERPL-MCNC: 212 MG/DL (ref 100–199)
CO2 SERPL-SCNC: 23 MMOL/L (ref 20–33)
CREAT SERPL-MCNC: 0.82 MG/DL (ref 0.5–1.4)
CREAT UR-MCNC: 145.5 MG/DL
EOSINOPHIL # BLD AUTO: 0.18 K/UL (ref 0–0.51)
EOSINOPHIL NFR BLD: 3.8 % (ref 0–6.9)
ERYTHROCYTE [DISTWIDTH] IN BLOOD BY AUTOMATED COUNT: 49 FL (ref 35.9–50)
EST. AVERAGE GLUCOSE BLD GHB EST-MCNC: 160 MG/DL
GLOBULIN SER CALC-MCNC: 3 G/DL (ref 1.9–3.5)
GLUCOSE SERPL-MCNC: 120 MG/DL (ref 65–99)
HBA1C MFR BLD: 7.2 % (ref 0–5.6)
HCT VFR BLD AUTO: 45.4 % (ref 37–47)
HDLC SERPL-MCNC: 44 MG/DL
HGB BLD-MCNC: 14.9 G/DL (ref 12–16)
IMM GRANULOCYTES # BLD AUTO: 0.01 K/UL (ref 0–0.11)
IMM GRANULOCYTES NFR BLD AUTO: 0.2 % (ref 0–0.9)
INR PPP: 1.15 (ref 0.87–1.13)
LDLC SERPL CALC-MCNC: 144 MG/DL
LYMPHOCYTES # BLD AUTO: 2.1 K/UL (ref 1–4.8)
LYMPHOCYTES NFR BLD: 43.8 % (ref 22–41)
MCH RBC QN AUTO: 33.3 PG (ref 27–33)
MCHC RBC AUTO-ENTMCNC: 32.8 G/DL (ref 33.6–35)
MCV RBC AUTO: 101.3 FL (ref 81.4–97.8)
MICROALBUMIN UR-MCNC: 0.7 MG/DL
MICROALBUMIN/CREAT UR: 5 MG/G (ref 0–30)
MONOCYTES # BLD AUTO: 0.47 K/UL (ref 0–0.85)
MONOCYTES NFR BLD AUTO: 9.8 % (ref 0–13.4)
NEUTROPHILS # BLD AUTO: 2.02 K/UL (ref 2–7.15)
NEUTROPHILS NFR BLD: 42 % (ref 44–72)
NRBC # BLD AUTO: 0 K/UL
NRBC BLD-RTO: 0 /100 WBC
PLATELET # BLD AUTO: 266 K/UL (ref 164–446)
PMV BLD AUTO: 10.1 FL (ref 9–12.9)
POTASSIUM SERPL-SCNC: 3.9 MMOL/L (ref 3.6–5.5)
PROT SERPL-MCNC: 6.7 G/DL (ref 6–8.2)
PROTHROMBIN TIME: 14.4 SEC (ref 12–14.6)
RBC # BLD AUTO: 4.48 M/UL (ref 4.2–5.4)
SODIUM SERPL-SCNC: 139 MMOL/L (ref 135–145)
TRIGL SERPL-MCNC: 121 MG/DL (ref 0–149)
TSH SERPL DL<=0.005 MIU/L-ACNC: 2.72 UIU/ML (ref 0.38–5.33)
WBC # BLD AUTO: 4.8 K/UL (ref 4.8–10.8)

## 2018-02-08 PROCEDURE — 84443 ASSAY THYROID STIM HORMONE: CPT

## 2018-02-08 PROCEDURE — 82570 ASSAY OF URINE CREATININE: CPT

## 2018-02-08 PROCEDURE — 85730 THROMBOPLASTIN TIME PARTIAL: CPT | Mod: GA

## 2018-02-08 PROCEDURE — 82043 UR ALBUMIN QUANTITATIVE: CPT

## 2018-02-08 PROCEDURE — 80053 COMPREHEN METABOLIC PANEL: CPT

## 2018-02-08 PROCEDURE — 71046 X-RAY EXAM CHEST 2 VIEWS: CPT

## 2018-02-08 PROCEDURE — 82306 VITAMIN D 25 HYDROXY: CPT | Mod: GA

## 2018-02-08 PROCEDURE — 83036 HEMOGLOBIN GLYCOSYLATED A1C: CPT | Mod: GA

## 2018-02-08 PROCEDURE — 36415 COLL VENOUS BLD VENIPUNCTURE: CPT

## 2018-02-08 PROCEDURE — 85610 PROTHROMBIN TIME: CPT | Mod: GA

## 2018-02-08 PROCEDURE — 85025 COMPLETE CBC W/AUTO DIFF WBC: CPT

## 2018-02-08 PROCEDURE — 80061 LIPID PANEL: CPT

## 2018-03-13 ENCOUNTER — OFFICE VISIT (OUTPATIENT)
Dept: CARDIOLOGY | Facility: MEDICAL CENTER | Age: 78
End: 2018-03-13
Payer: MEDICARE

## 2018-03-13 VITALS
OXYGEN SATURATION: 94 % | BODY MASS INDEX: 31.36 KG/M2 | HEIGHT: 63 IN | SYSTOLIC BLOOD PRESSURE: 144 MMHG | HEART RATE: 78 BPM | WEIGHT: 177 LBS | DIASTOLIC BLOOD PRESSURE: 86 MMHG

## 2018-03-13 DIAGNOSIS — I10 ESSENTIAL HYPERTENSION: ICD-10-CM

## 2018-03-13 DIAGNOSIS — R55 NEAR SYNCOPE: ICD-10-CM

## 2018-03-13 DIAGNOSIS — I20.89 STABLE ANGINA PECTORIS: ICD-10-CM

## 2018-03-13 DIAGNOSIS — I63.9 CEREBROVASCULAR ACCIDENT (CVA), UNSPECIFIED MECHANISM (HCC): ICD-10-CM

## 2018-03-13 DIAGNOSIS — E11.9 DIABETES MELLITUS TYPE 2, NONINSULIN DEPENDENT (HCC): ICD-10-CM

## 2018-03-13 DIAGNOSIS — R00.2 PALPITATIONS: ICD-10-CM

## 2018-03-13 DIAGNOSIS — Z01.810 PRE-OPERATIVE CARDIOVASCULAR EXAMINATION: ICD-10-CM

## 2018-03-13 PROCEDURE — 99214 OFFICE O/P EST MOD 30 MIN: CPT | Performed by: INTERNAL MEDICINE

## 2018-03-13 ASSESSMENT — ENCOUNTER SYMPTOMS
CLAUDICATION: 0
SHORTNESS OF BREATH: 0
DIZZINESS: 0
MUSCULOSKELETAL NEGATIVE: 1
SPUTUM PRODUCTION: 0
LOSS OF CONSCIOUSNESS: 0
HEMOPTYSIS: 0
SORE THROAT: 0
STRIDOR: 0
GASTROINTESTINAL NEGATIVE: 1
RESPIRATORY NEGATIVE: 1
WEAKNESS: 0
WHEEZING: 0
PALPITATIONS: 0
ORTHOPNEA: 0
BRUISES/BLEEDS EASILY: 0
CARDIOVASCULAR NEGATIVE: 1
PND: 0
NEUROLOGICAL NEGATIVE: 1
FEVER: 0
EYES NEGATIVE: 1
COUGH: 0
CONSTITUTIONAL NEGATIVE: 1
CHILLS: 0

## 2018-03-13 NOTE — LETTER
[facility]   [address] [place] [address]  [place] [address]  Phone: [phone]  Fax: [phone]              Bailey White  1940    Encounter Date: 3/13/2018    Eliceo May M.D.          PROGRESS NOTE:  Subjective:   Bailey White is a 77 y.o. female who presents today as follow-up for her preoperative stratification. Apparently her surgeon never asked for clarification on her preop risk stratification. Her nuclear stress test showed no ischemia. She's been having chest pain palpitations or PND. Her cholesterol is elevated but she declined statins and would not want to be on the medications. Otherwise her blood pressure is well controlled. Her echocardiogram was normal. Her event Recorder showed no significant abnormalities.    Past Medical History:   Diagnosis Date   • Anesthesia     woke up during foot surgery   • Arthritis    • Blood transfusion    • Bronchitis 2011   • Carotid stenosis    • CATARACT     repaired   • CVA (cerebral vascular accident) (CMS-HCC)    • Diabetes     off metformin diet controlled   • Fibromyalgia    • Fibromyalgia    • GERD (gastroesophageal reflux disease)    • Heart burn    • Herpes genital    • Hiatus hernia syndrome    • HTN    • Indigestion    • Insomnia    • Migraine    • Pain    • Seizure (CMS-HCC)     last seizure 2010   • Thyroid disease    • TIA     multiple   • Unspecified hemorrhagic conditions     on Plavix   • Unspecified urinary incontinence    • Urinary bladder disorder     incontinence     Past Surgical History:   Procedure Laterality Date   • HYSTEROSCOPY WITH VIDEO DIAGNOSTIC  9/5/2013    Performed by Portillo Barragan M.D. at SURGERY SAME DAY [facility]   • DILATION AND CURETTAGE  9/5/2013    Performed by Portillo Barragan M.D. at SURGERY SAME DAY [facility]   • OTHER      cataracts   • OTHER ORTHOPEDIC SURGERY     • TONSILLECTOMY       Family History   Problem Relation Age of  Onset   • Heart Disease Father    • Cancer Maternal Grandmother      History   Smoking Status   • Former Smoker   • Packs/day: 3.00   • Years: 1.00   • Types: Cigarettes   • Quit date: 1/1/1955   Smokeless Tobacco   • Never Used     Allergies   Allergen Reactions   • Asa [Aspirin]      GI bleeding   • Ciprofloxacin Rash   • Clindamycin Hives   • Codeine Vomiting   • Food Anxiety     Yellow dye, peanuts and peanut products, all peppers,tomato, potato,wheat, gluten   • Hydrocortisone      Suicidal after Cortisone injection   • Ibuprofen      Abdominal pain   • Imitrex [Sumatriptan Succinate] Vomiting   • Inderal [Propranolol Hcl]      Not sure of reaction   • Lyrica      suicidal   • Mellaril      Psychotic episode   • Other Drug      All mycins,cillins,and cyclines   • Pcn [Penicillins] Hives   • Ropinirole Hcl [Requip] Shortness of Breath   • Shellfish Allergy      headaches   • Sulfa Drugs Itching   • Tagamet [Cimetidine]      Mental confusion     Outpatient Encounter Prescriptions as of 3/13/2018   Medication Sig Dispense Refill   • glipiZIDE (GLUCOTROL) 5 MG Tab TAKE 1/2 TABLET TWICE DAILY 90 Tab 3   • clopidogrel (PLAVIX) 75 MG Tab TAKE 1 TABLET EVERY DAY 90 Tab 3   • omeprazole (PRILOSEC) 20 MG delayed-release capsule TAKE 1 CAPSULE EVERY DAY 90 Cap 3   • amlodipine (NORVASC) 5 MG Tab TAKE 1 TABLET EVERY DAY 90 Tab 3   • acyclovir (ZOVIRAX) 200 MG Cap Take 1 Cap by mouth every day. 90 Cap 3   • levothyroxine (SYNTHROID) 75 MCG Tab Take 1 Tab by mouth Every morning on an empty stomach. 90 Tab 3   • Blood Glucose Monitoring Suppl Device Meter: Dispense Device of Insurance Preference. Sig. Use as directed for blood sugar monitoring. #1. NR. 1 Device 0   • acetaminophen (TYLENOL) 500 MG TABS Take 500-1,000 mg by mouth every 6 hours as needed.     • Cholecalciferol (VITAMIN D) 2000 UNITS CAPS Take  by mouth every day.     • Non Formulary Request as needed. OTC Restless leg     • [DISCONTINUED] fluticasone (FLONASE)  "50 MCG/ACT nasal spray Spray 1 Spray in nose every day. Each Nostril 16 g 1     No facility-administered encounter medications on file as of 3/13/2018.      Review of Systems   Constitutional: Negative.  Negative for chills, fever and malaise/fatigue.   HENT: Negative.  Negative for sore throat.    Eyes: Negative.    Respiratory: Negative.  Negative for cough, hemoptysis, sputum production, shortness of breath, wheezing and stridor.    Cardiovascular: Negative.  Negative for chest pain, palpitations, orthopnea, claudication, leg swelling and PND.   Gastrointestinal: Negative.    Genitourinary: Negative.    Musculoskeletal: Negative.    Skin: Negative.    Neurological: Negative.  Negative for dizziness, loss of consciousness and weakness.   Endo/Heme/Allergies: Negative.  Does not bruise/bleed easily.   All other systems reviewed and are negative.       Objective:   /86   Pulse 78   Ht 1.6 m (5' 3\")   Wt 80.3 kg (177 lb)   LMP 01/01/1983   SpO2 94%   BMI 31.35 kg/m²      Physical Exam   Constitutional: She is oriented to person, place, and time. She appears well-developed and well-nourished. No distress.   HENT:   Head: Normocephalic.   Mouth/Throat: Oropharynx is clear and moist.   Eyes: EOM are normal. Pupils are equal, round, and reactive to light. Right eye exhibits no discharge. Left eye exhibits no discharge. No scleral icterus.   Neck: Normal range of motion. Neck supple. No JVD present. No tracheal deviation present.   Cardiovascular: Normal rate, regular rhythm, S1 normal, S2 normal, normal heart sounds, intact distal pulses and normal pulses.  Exam reveals no gallop, no S3, no S4 and no friction rub.    No murmur heard.   No systolic murmur is present    No diastolic murmur is present   Pulses:       Carotid pulses are 2+ on the right side, and 2+ on the left side.       Radial pulses are 2+ on the right side, and 2+ on the left side.        Dorsalis pedis pulses are 2+ on the right side, and 2+ " on the left side.        Posterior tibial pulses are 2+ on the right side, and 2+ on the left side.   Pulmonary/Chest: Effort normal and breath sounds normal. No respiratory distress. She has no wheezes. She has no rales.   Abdominal: Soft. Bowel sounds are normal. She exhibits no distension and no mass. There is no tenderness. There is no rebound and no guarding.   Musculoskeletal: She exhibits no edema.   Neurological: She is alert and oriented to person, place, and time. No cranial nerve deficit.   Skin: Skin is warm and dry. She is not diaphoretic. No pallor.   Psychiatric: She has a normal mood and affect. Her behavior is normal. Judgment and thought content normal.   Nursing note and vitals reviewed.      Assessment:     1. Cerebrovascular accident (CVA), unspecified mechanism (CMS-HCC)     2. Stable angina pectoris (CMS-[facility])     3. Diabetes mellitus type 2, noninsulin dependent (CMS-[facility])     4. Essential hypertension     5. Near syncope     6. Palpitations     7. Pre-operative cardiovascular examination         Medical Decision Making:  Today's Assessment / Status / Plan:     77-year-old female with preoperative risk stratification. She is moderate risk for her moderate risk surgery. I would not recommend any further cardiovascular testing. In the interim I did discuss with her the role of statins in cholesterol management and reduction ASCVD. She declines at this point. We will see her back on an as-needed basis. We will make sure that her surgeon is aware of her operative risk.    Thank for you allowing me to take part in your patient's care, please call should you have any questions or would like to discuss this patient.      Ken Jaimes M.D.  [address] [place] [address]  VIA Facsimile: [phone]

## 2018-03-14 NOTE — PROGRESS NOTES
Subjective:   Bailey White is a 77 y.o. female who presents today as follow-up for her preoperative stratification. Apparently her surgeon never asked for clarification on her preop risk stratification. Her nuclear stress test showed no ischemia. She's been having chest pain palpitations or PND. Her cholesterol is elevated but she declined statins and would not want to be on the medications. Otherwise her blood pressure is well controlled. Her echocardiogram was normal. Her event Recorder showed no significant abnormalities.    Past Medical History:   Diagnosis Date   • Anesthesia     woke up during foot surgery   • Arthritis    • Blood transfusion    • Bronchitis 2011   • Carotid stenosis    • CATARACT     repaired   • CVA (cerebral vascular accident) (CMS-HCC)    • Diabetes     off metformin diet controlled   • Fibromyalgia    • Fibromyalgia    • GERD (gastroesophageal reflux disease)    • Heart burn    • Herpes genital    • Hiatus hernia syndrome    • HTN    • Indigestion    • Insomnia    • Migraine    • Pain    • Seizure (CMS-HCC)     last seizure 2010   • Thyroid disease    • TIA     multiple   • Unspecified hemorrhagic conditions     on Plavix   • Unspecified urinary incontinence    • Urinary bladder disorder     incontinence     Past Surgical History:   Procedure Laterality Date   • HYSTEROSCOPY WITH VIDEO DIAGNOSTIC  9/5/2013    Performed by Portillo Barragan M.D. at SURGERY SAME DAY [facility]   • DILATION AND CURETTAGE  9/5/2013    Performed by Portillo Barragan M.D. at SURGERY SAME DAY [facility]   • OTHER      cataracts   • OTHER ORTHOPEDIC SURGERY     • TONSILLECTOMY       Family History   Problem Relation Age of Onset   • Heart Disease Father    • Cancer Maternal Grandmother      History   Smoking Status   • Former Smoker   • Packs/day: 3.00   • Years: 1.00   • Types: Cigarettes   • Quit date: 1/1/1955   Smokeless Tobacco   • Never Used     Allergies   Allergen Reactions   • Asa  [Aspirin]      GI bleeding   • Ciprofloxacin Rash   • Clindamycin Hives   • Codeine Vomiting   • Food Anxiety     Yellow dye, peanuts and peanut products, all peppers,tomato, potato,wheat, gluten   • Hydrocortisone      Suicidal after Cortisone injection   • Ibuprofen      Abdominal pain   • Imitrex [Sumatriptan Succinate] Vomiting   • Inderal [Propranolol Hcl]      Not sure of reaction   • Lyrica      suicidal   • Mellaril      Psychotic episode   • Other Drug      All mycins,cillins,and cyclines   • Pcn [Penicillins] Hives   • Ropinirole Hcl [Requip] Shortness of Breath   • Shellfish Allergy      headaches   • Sulfa Drugs Itching   • Tagamet [Cimetidine]      Mental confusion     Outpatient Encounter Prescriptions as of 3/13/2018   Medication Sig Dispense Refill   • glipiZIDE (GLUCOTROL) 5 MG Tab TAKE 1/2 TABLET TWICE DAILY 90 Tab 3   • clopidogrel (PLAVIX) 75 MG Tab TAKE 1 TABLET EVERY DAY 90 Tab 3   • omeprazole (PRILOSEC) 20 MG delayed-release capsule TAKE 1 CAPSULE EVERY DAY 90 Cap 3   • amlodipine (NORVASC) 5 MG Tab TAKE 1 TABLET EVERY DAY 90 Tab 3   • acyclovir (ZOVIRAX) 200 MG Cap Take 1 Cap by mouth every day. 90 Cap 3   • levothyroxine (SYNTHROID) 75 MCG Tab Take 1 Tab by mouth Every morning on an empty stomach. 90 Tab 3   • Blood Glucose Monitoring Suppl Device Meter: Dispense Device of Insurance Preference. Sig. Use as directed for blood sugar monitoring. #1. NR. 1 Device 0   • acetaminophen (TYLENOL) 500 MG TABS Take 500-1,000 mg by mouth every 6 hours as needed.     • Cholecalciferol (VITAMIN D) 2000 UNITS CAPS Take  by mouth every day.     • Non Formulary Request as needed. OTC Restless leg     • [DISCONTINUED] fluticasone (FLONASE) 50 MCG/ACT nasal spray Spray 1 Spray in nose every day. Each Nostril 16 g 1     No facility-administered encounter medications on file as of 3/13/2018.      Review of Systems   Constitutional: Negative.  Negative for chills, fever and malaise/fatigue.   HENT: Negative.   "Negative for sore throat.    Eyes: Negative.    Respiratory: Negative.  Negative for cough, hemoptysis, sputum production, shortness of breath, wheezing and stridor.    Cardiovascular: Negative.  Negative for chest pain, palpitations, orthopnea, claudication, leg swelling and PND.   Gastrointestinal: Negative.    Genitourinary: Negative.    Musculoskeletal: Negative.    Skin: Negative.    Neurological: Negative.  Negative for dizziness, loss of consciousness and weakness.   Endo/Heme/Allergies: Negative.  Does not bruise/bleed easily.   All other systems reviewed and are negative.       Objective:   /86   Pulse 78   Ht 1.6 m (5' 3\")   Wt 80.3 kg (177 lb)   LMP 01/01/1983   SpO2 94%   BMI 31.35 kg/m²     Physical Exam   Constitutional: She is oriented to person, place, and time. She appears well-developed and well-nourished. No distress.   HENT:   Head: Normocephalic.   Mouth/Throat: Oropharynx is clear and moist.   Eyes: EOM are normal. Pupils are equal, round, and reactive to light. Right eye exhibits no discharge. Left eye exhibits no discharge. No scleral icterus.   Neck: Normal range of motion. Neck supple. No JVD present. No tracheal deviation present.   Cardiovascular: Normal rate, regular rhythm, S1 normal, S2 normal, normal heart sounds, intact distal pulses and normal pulses.  Exam reveals no gallop, no S3, no S4 and no friction rub.    No murmur heard.   No systolic murmur is present    No diastolic murmur is present   Pulses:       Carotid pulses are 2+ on the right side, and 2+ on the left side.       Radial pulses are 2+ on the right side, and 2+ on the left side.        Dorsalis pedis pulses are 2+ on the right side, and 2+ on the left side.        Posterior tibial pulses are 2+ on the right side, and 2+ on the left side.   Pulmonary/Chest: Effort normal and breath sounds normal. No respiratory distress. She has no wheezes. She has no rales.   Abdominal: Soft. Bowel sounds are normal. She " exhibits no distension and no mass. There is no tenderness. There is no rebound and no guarding.   Musculoskeletal: She exhibits no edema.   Neurological: She is alert and oriented to person, place, and time. No cranial nerve deficit.   Skin: Skin is warm and dry. She is not diaphoretic. No pallor.   Psychiatric: She has a normal mood and affect. Her behavior is normal. Judgment and thought content normal.   Nursing note and vitals reviewed.      Assessment:     1. Cerebrovascular accident (CVA), unspecified mechanism (CMS-[facility])     2. Stable angina pectoris (CMS-[facility])     3. Diabetes mellitus type 2, noninsulin dependent (CMS-[facility])     4. Essential hypertension     5. Near syncope     6. Palpitations     7. Pre-operative cardiovascular examination         Medical Decision Making:  Today's Assessment / Status / Plan:     77-year-old female with preoperative risk stratification. She is moderate risk for her moderate risk surgery. I would not recommend any further cardiovascular testing. In the interim I did discuss with her the role of statins in cholesterol management and reduction ASCVD. She declines at this point. We will see her back on an as-needed basis. We will make sure that her surgeon is aware of her operative risk.    Thank for you allowing me to take part in your patient's care, please call should you have any questions or would like to discuss this patient.

## 2018-03-22 ENCOUNTER — OFFICE VISIT (OUTPATIENT)
Dept: MEDICAL GROUP | Facility: PHYSICIAN GROUP | Age: 78
End: 2018-03-22
Payer: MEDICARE

## 2018-03-22 VITALS
HEIGHT: 63 IN | TEMPERATURE: 98.1 F | SYSTOLIC BLOOD PRESSURE: 110 MMHG | RESPIRATION RATE: 16 BRPM | OXYGEN SATURATION: 96 % | BODY MASS INDEX: 31.18 KG/M2 | WEIGHT: 176 LBS | HEART RATE: 82 BPM | DIASTOLIC BLOOD PRESSURE: 86 MMHG

## 2018-03-22 DIAGNOSIS — M25.562 CHRONIC PAIN OF LEFT KNEE: ICD-10-CM

## 2018-03-22 DIAGNOSIS — E11.9 DIABETES MELLITUS TYPE 2, NONINSULIN DEPENDENT (HCC): ICD-10-CM

## 2018-03-22 DIAGNOSIS — I10 ESSENTIAL HYPERTENSION: ICD-10-CM

## 2018-03-22 DIAGNOSIS — M79.7 FIBROMYALGIA: ICD-10-CM

## 2018-03-22 DIAGNOSIS — E11.9 TYPE 2 DIABETES MELLITUS WITHOUT COMPLICATION, WITHOUT LONG-TERM CURRENT USE OF INSULIN (HCC): ICD-10-CM

## 2018-03-22 DIAGNOSIS — G89.29 CHRONIC PAIN OF LEFT KNEE: ICD-10-CM

## 2018-03-22 DIAGNOSIS — I63.9 CEREBROVASCULAR ACCIDENT (CVA), UNSPECIFIED MECHANISM (HCC): ICD-10-CM

## 2018-03-22 DIAGNOSIS — Z78.0 POST-MENOPAUSAL: ICD-10-CM

## 2018-03-22 PROCEDURE — 99214 OFFICE O/P EST MOD 30 MIN: CPT | Performed by: NURSE PRACTITIONER

## 2018-03-22 NOTE — ASSESSMENT & PLAN NOTE
bp 110/86 today.  No chest pain reported.  Taking amlodipine daily.  Need to consider ACE due to diabetes.

## 2018-03-22 NOTE — ASSESSMENT & PLAN NOTE
bs running 120's.  Glipizide daily.  Checks BS daily.  Journals food.  Would like to have more veggies, but due to no teeth on the bottom, this is difficult.  Exercise is difficult due to upcoming left knee replacement. No reported low blood sugars.

## 2018-03-22 NOTE — PROGRESS NOTES
Chief Complaint   Patient presents with   • Diabetes     new patient, diabetes type 2, knee replacement coming up,        Subjective:   Bailey White is a 78 y.o. female here today tp establish care andfor evaluation and management of:    Diabetes mellitus type 2, noninsulin dependent  bs running 120's.  Glipizide daily.  Checks BS daily.  Journals food.  Would like to have more veggies, but due to no teeth on the bottom, this is difficult.  Exercise is difficult due to upcoming left knee replacement. No reported low blood sugars.     Fibromyalgia  Has some numbness and tingling of feet.  Chronic fatigue.     HTN (hypertension)  bp 110/86 today.  No chest pain reported.  Taking amlodipine daily.  Need to consider ACE due to diabetes.     CVA (Cerebral Vascular Accident), h/o  Daily plavix 75 mg.  No increased falls or bleeding reported.      Has upcoming left total knee replacement in April.  Has already done preop and cardiac clearance.     Current medicines (including changes today)  Current Outpatient Prescriptions   Medication Sig Dispense Refill   • glipiZIDE (GLUCOTROL) 5 MG Tab TAKE 1/2 TABLET TWICE DAILY 90 Tab 3   • clopidogrel (PLAVIX) 75 MG Tab TAKE 1 TABLET EVERY DAY 90 Tab 3   • omeprazole (PRILOSEC) 20 MG delayed-release capsule TAKE 1 CAPSULE EVERY DAY 90 Cap 3   • amlodipine (NORVASC) 5 MG Tab TAKE 1 TABLET EVERY DAY 90 Tab 3   • acyclovir (ZOVIRAX) 200 MG Cap Take 1 Cap by mouth every day. 90 Cap 3   • levothyroxine (SYNTHROID) 75 MCG Tab Take 1 Tab by mouth Every morning on an empty stomach. 90 Tab 3   • Blood Glucose Monitoring Suppl Device Meter: Dispense Device of Insurance Preference. Sig. Use as directed for blood sugar monitoring. #1. NR. 1 Device 0   • acetaminophen (TYLENOL) 500 MG TABS Take 500-1,000 mg by mouth every 6 hours as needed.     • Cholecalciferol (VITAMIN D) 2000 UNITS CAPS Take  by mouth every day.     • Non Formulary Request as needed. OTC Restless leg       No  "current facility-administered medications for this visit.      She  has a past medical history of Anesthesia; Arthritis; Blood transfusion; Bronchitis (2011); Carotid stenosis; CATARACT; CVA (cerebral vascular accident) (CMS-HCC); Diabetes; Fibromyalgia; Fibromyalgia; GERD (gastroesophageal reflux disease); Heart burn; Herpes genital; Hiatus hernia syndrome; HTN; Indigestion; Insomnia; Migraine; Pain; Seizure (CMS-HCC); Thyroid disease; TIA; Unspecified hemorrhagic conditions; Unspecified urinary incontinence; and Urinary bladder disorder. She also has no past medical history of ASTHMA or Breast cancer (CMS-HCC).    ROS as stated in hpi  No chest pain, no shortness of breath, no abdominal pain       Objective:     Blood pressure 110/86, pulse 82, temperature 36.7 °C (98.1 °F), resp. rate 16, height 1.6 m (5' 3\"), weight 79.8 kg (176 lb), last menstrual period 01/01/1983, SpO2 96 %. Body mass index is 31.18 kg/m².   Physical Exam:  Constitutional: Alert, no distress.  Skin: Warm, dry, good turgor,no cyanosis, no rashes in visible areas.  Eye: Equal, round and reactive, conjunctiva clear, lids normal.  Ears: No tenderness, no discharge.  External canals are without any significant edema or erythema. Gross auditory acuity is intact.  Nose: symmetrical without tenderness, no discharge.  Mouth/Throat: lips without lesion.  Oropharynx clear.   Neck: Trachea midline, no masses, no obvious thyroid enlargement..  Range of motion within normal limits.  Neuro: Cranial nerves 2-12 grossly intact.  No sensory deficit.  Respiratory: Unlabored respiratory effort, lungs clear to auscultation, no wheezes, no ronchi.  Cardiovascular: Normal S1, S2, no murmur, no edema.  MSK:  Left knee with chronic pain and swelling appreciated.    Psych: Alert and oriented x3, normal affect and mood and judgement.        Assessment and Plan:   The following treatment plan was discussed    1. Chronic pain of left knee  This is a new problem to me " chronic. Ongoing. Patient is scheduled for a left total knee replacement with Dr. Ken Jaimes at [facility]. April 10. Discussed at length rehabilitation and physical therapy postoperatively also discussed the need for stool softeners or MiraLAX as she will be taking narcotics for postoperative pain. Monitor.    2. Type 2 diabetes mellitus without complication, without long-term current use of insulin (CMS-HCC)  This is any problem to me. Chronic. Ongoing. Continue with glipizide 5 mg daily. Continue to watch diet and continue to check blood sugars daily and record. Red flag warnings reviewed with patient. She is due for retinal eye exam today. Monitor and follow results.  - POCT Retinal Eye Exam    3. Post-menopausal  This is a new problem to me. Chronic. She would like a updated bone density study prior to her knee surgery. Last bone density in 2011 showed osteopenia. Order placed. Monitor results.  - DS-BONE DENSITY STUDY (DEXA); Future    4. Diabetes mellitus type 2, noninsulin dependent (CMS-HCC)  See problem #2. Patient is requesting a referral to ophthalmology as she would like to have an ongoing physician to monitor her eyes with her diabetes. Referral placed. Monitor.  - REFERRAL TO OPHTHALMOLOGY    5. Fibromyalgia  This is a new problem to me. Chronic. Ongoing. Not currently on any medications specifically for this. Tried Lyrica but had bad reaction. Monitor and follow. 6. Essential hypertension  This is a new problem to me. Chronic. Ongoing. Blood pressure goal today. She is on amlodipine and we need to consider switching over to an ace due to her diabetes to protect her kidneys. At this time we will continue with amlodipine as she is preparing for surgery and is well controlled. We will consider this after her surgery. Monitor and follow.    7. Cerebrovascular accident (CVA), unspecified mechanism (CMS-HCC)  This is a new problem to me. Chronic. Stable at this time. Is taking  Plavix daily. No stroke symptoms recorded.      Followup: Return in about 4 months (around 7/22/2018).

## 2018-04-02 ENCOUNTER — PATIENT MESSAGE (OUTPATIENT)
Dept: MEDICAL GROUP | Facility: PHYSICIAN GROUP | Age: 78
End: 2018-04-02

## 2018-04-02 DIAGNOSIS — E11.9 TYPE 2 DIABETES MELLITUS WITHOUT COMPLICATION, WITHOUT LONG-TERM CURRENT USE OF INSULIN (HCC): ICD-10-CM

## 2018-04-09 ENCOUNTER — HOSPITAL ENCOUNTER (OUTPATIENT)
Dept: RADIOLOGY | Facility: MEDICAL CENTER | Age: 78
End: 2018-04-09
Attending: NURSE PRACTITIONER
Payer: MEDICARE

## 2018-04-09 DIAGNOSIS — Z78.0 POST-MENOPAUSAL: ICD-10-CM

## 2018-04-09 PROCEDURE — 77080 DXA BONE DENSITY AXIAL: CPT

## 2018-04-10 PROBLEM — M85.80 OSTEOPENIA DETERMINED BY X-RAY: Status: ACTIVE | Noted: 2018-04-10

## 2018-04-24 ENCOUNTER — TELEPHONE (OUTPATIENT)
Dept: MEDICAL GROUP | Facility: PHYSICIAN GROUP | Age: 78
End: 2018-04-24

## 2018-04-24 NOTE — TELEPHONE ENCOUNTER
Phone Number Called: [phone]    Message: I called [facility], they sent us a fax letting us know that they have sent records from visit 4/23/18 4 times already, we dont have those records so I followed up with them leaving our fax number.    Left Message for patient to call back: yes

## 2018-06-05 ENCOUNTER — APPOINTMENT (OUTPATIENT)
Dept: RADIOLOGY | Facility: MEDICAL CENTER | Age: 78
End: 2018-06-05
Attending: NURSE PRACTITIONER
Payer: MEDICARE

## 2018-06-13 ENCOUNTER — HOSPITAL ENCOUNTER (OUTPATIENT)
Facility: MEDICAL CENTER | Age: 78
End: 2018-06-13
Attending: PHYSICIAN ASSISTANT
Payer: MEDICARE

## 2018-06-13 ENCOUNTER — OFFICE VISIT (OUTPATIENT)
Dept: URGENT CARE | Facility: PHYSICIAN GROUP | Age: 78
End: 2018-06-13
Payer: MEDICARE

## 2018-06-13 VITALS
HEART RATE: 110 BPM | HEIGHT: 63 IN | SYSTOLIC BLOOD PRESSURE: 140 MMHG | WEIGHT: 158 LBS | BODY MASS INDEX: 28 KG/M2 | TEMPERATURE: 98.5 F | OXYGEN SATURATION: 96 % | DIASTOLIC BLOOD PRESSURE: 90 MMHG

## 2018-06-13 DIAGNOSIS — N39.0 RECURRENT URINARY TRACT INFECTION: ICD-10-CM

## 2018-06-13 LAB
APPEARANCE UR: NORMAL
BILIRUB UR STRIP-MCNC: NEGATIVE MG/DL
COLOR UR AUTO: NORMAL
GLUCOSE UR STRIP.AUTO-MCNC: NEGATIVE MG/DL
KETONES UR STRIP.AUTO-MCNC: NEGATIVE MG/DL
LEUKOCYTE ESTERASE UR QL STRIP.AUTO: NORMAL
NITRITE UR QL STRIP.AUTO: POSITIVE
PH UR STRIP.AUTO: 6 [PH] (ref 5–8)
PROT UR QL STRIP: 30 MG/DL
RBC UR QL AUTO: NORMAL
SP GR UR STRIP.AUTO: 1.03
UROBILINOGEN UR STRIP-MCNC: NEGATIVE MG/DL

## 2018-06-13 PROCEDURE — 87086 URINE CULTURE/COLONY COUNT: CPT

## 2018-06-13 PROCEDURE — 87186 SC STD MICRODIL/AGAR DIL: CPT

## 2018-06-13 PROCEDURE — 99214 OFFICE O/P EST MOD 30 MIN: CPT | Performed by: PHYSICIAN ASSISTANT

## 2018-06-13 PROCEDURE — 87077 CULTURE AEROBIC IDENTIFY: CPT

## 2018-06-13 PROCEDURE — 81002 URINALYSIS NONAUTO W/O SCOPE: CPT | Performed by: PHYSICIAN ASSISTANT

## 2018-06-13 RX ORDER — NITROFURANTOIN 25; 75 MG/1; MG/1
100 CAPSULE ORAL 2 TIMES DAILY
Qty: 20 CAP | Refills: 0 | Status: CANCELLED | OUTPATIENT
Start: 2018-06-13

## 2018-06-13 RX ORDER — CEFDINIR 300 MG/1
300 CAPSULE ORAL 2 TIMES DAILY
Qty: 10 CAP | Refills: 0 | Status: SHIPPED | OUTPATIENT
Start: 2018-06-13 | End: 2018-06-18

## 2018-06-13 ASSESSMENT — ENCOUNTER SYMPTOMS
SHORTNESS OF BREATH: 0
FEVER: 0
CHILLS: 0
EYE DISCHARGE: 0
ABDOMINAL PAIN: 1
DIARRHEA: 0
NAUSEA: 0
VOMITING: 0
FLANK PAIN: 0
EYE REDNESS: 0

## 2018-06-13 ASSESSMENT — PAIN SCALES - GENERAL: PAINLEVEL: 4=SLIGHT-MODERATE PAIN

## 2018-06-13 NOTE — PROGRESS NOTES
"Subjective:   Bailey White is a 78 y.o. female who presents for UTI (x 2 days)        UTI   This is a new problem. The current episode started yesterday. Associated symptoms include abdominal pain ( mild lower abd cramping). Pertinent negatives include no chest pain, chills, fever, nausea, rash or vomiting.   Patient is seen with daughter present. Together they describe last 2 months of complicated history of total knee arthroplasty readmission. Acute rehabilitation in skilled nursing facility progression- this is all taken place in [facility] ER. Patient states over the last 2 weeks she has had symptoms of urinary tract infection for which she was evidently hospitalized at [facility] ER following the above...    She comes to clinic today stating she's been on cefdinir for the last 5 days with decent resolution of urinary symptoms of lower abdominal cramping. Upon completing antibiotic. She notes return of lower abdominal cramping over the following 48 hours. She and her daughter request and extension of cefdinir antibiotic x 5 more days today. I attempted to discuss with them the nature of urinary infections. The possibility of resistant bacteria but they became somewhat argumentative and threatened to leave the clinic \"I guess we have to waste our day in the ER again if you're not going to help\".    I discuss w/ them that with her extensive allergies to antibiotics, inability to see recent kidney fxn or urine culture - and w/ continuing sx despite what she describes as IV abx followed by outpt 5d of PO abx.    We were able to get records from [facility] emergency department which shows low colonized cx for ecoli sens to cephalosporin.    Additionally, we've arranged follow-up appointment with patient's primary care Kathryn Forrest in 6 days. She and her mother are frustrated w/ recent care and initially rather emotional regarding my need to check labs / cx and make sure she is on " "appropriate abx but they do apologize and are appropriate prior to leaving clinic.    Review of Systems   Constitutional: Negative for chills and fever.   Eyes: Negative for discharge and redness.   Respiratory: Negative for shortness of breath.    Cardiovascular: Negative for chest pain and leg swelling.   Gastrointestinal: Positive for abdominal pain ( mild lower abd cramping). Negative for diarrhea, nausea and vomiting.   Genitourinary: Positive for urgency. Negative for dysuria, flank pain, frequency and hematuria.   Skin: Negative for rash.     Allergies   Allergen Reactions   • Asa [Aspirin]      GI bleeding   • Ciprofloxacin Rash   • Clindamycin Hives   • Codeine Vomiting   • Food Anxiety     Yellow dye, peanuts and peanut products, all peppers,tomato, potato,wheat, gluten   • Hydrocortisone      Suicidal after Cortisone injection   • Ibuprofen      Abdominal pain   • Imitrex [Sumatriptan Succinate] Vomiting   • Inderal [Propranolol Hcl]      Not sure of reaction   • Lyrica      suicidal   • Mellaril      Psychotic episode   • Other Drug      All mycins,cillins,and cyclines   • Pcn [Penicillins] Hives   • Ropinirole Hcl [Requip] Shortness of Breath   • Shellfish Allergy      headaches   • Sulfa Drugs Itching   • Tagamet [Cimetidine]      Mental confusion      Objective:   /90   Pulse (!) 110   Temp 36.9 °C (98.5 °F)   Ht 1.6 m (5' 3\")   Wt 71.7 kg (158 lb)   LMP 01/01/1983   SpO2 96%   BMI 27.99 kg/m²   Physical Exam   Constitutional: She is oriented to person, place, and time. She appears well-developed and well-nourished. No distress.   HENT:   Head: Normocephalic and atraumatic.   Right Ear: External ear normal.   Left Ear: External ear normal.   Nose: Nose normal.   Eyes: Conjunctivae and lids are normal. Right eye exhibits no discharge. Left eye exhibits no discharge. No scleral icterus.   Neck: Neck supple.   Pulmonary/Chest: Effort normal. No respiratory distress.   Abdominal: Soft. Normal " appearance. There is no tenderness. There is no rigidity, no guarding and no CVA tenderness.   Musculoskeletal: Normal range of motion.   Neurological: She is alert and oriented to person, place, and time. She is not disoriented.   Skin: Skin is warm and dry. She is not diaphoretic. No erythema. No pallor.   Psychiatric: Her speech is normal and behavior is normal.   Nursing note and vitals reviewed.  POCT UA-   POC Color Dark Yellow  Negative Final   POC Appearance Cloudy  Negative Final   POC Leukocyte Esterase Trace  Negative Final   POC Nitrites Positive  Negative Final   POC Urobiligen Negative  Negative (0.2) mg/dL Final   POC Protein 30  Negative mg/dL Final   POC Urine PH 6.0  5.0 - 8.0 Final   POC Blood Trace  Negative Final   POC Specific Gravity 1.030  <1.005 - >1.030 Final   POC Ketones Negative  Negative mg/dL Final   POC Bilirubin Negative  Negative mg/dL Final   POC Glucose Negative  Negative mg/dL Final       Urine cx pending    Calls to [facility] regarding results of recent urine cx: see scanned documents from recent hospitalization w/ [facility]      Assessment/Plan:   Assessment    1. Recurrent urinary tract infection  - POCT Urinalysis  - URINE CULTURE(NEW); Future  - cefdinir (OMNICEF) 300 MG Cap; Take 1 Cap by mouth 2 times a day for 5 days.  Dispense: 10 Cap; Refill: 0  will refill cefdinir today as reasonable choice and coverage based on allergies - and recent cx - I will cx again today and can be followed at time of PCP f/u next week    Supportive care is reviewed with patient/caregiver - recommend to push PO fluids and electrolytes, nsaids/tylenol, rest, full course of abx, probiotics w/ abx, azo/cran, observe for resolution  Return to clinic with lack of resolution or progression of symptoms.    ER precautions with any worsening symptoms are reviewed with patient/caregiver and they do express understanding        Differential diagnosis, natural history, supportive care, and indications for immediate  follow-up discussed.

## 2018-06-15 DIAGNOSIS — N39.0 RECURRENT URINARY TRACT INFECTION: ICD-10-CM

## 2018-06-18 DIAGNOSIS — N30.90 CYSTITIS: ICD-10-CM

## 2018-06-18 LAB
BACTERIA UR CULT: ABNORMAL
SIGNIFICANT IND 70042: ABNORMAL
SITE SITE: ABNORMAL
SOURCE SOURCE: ABNORMAL

## 2018-06-18 RX ORDER — NITROFURANTOIN 25; 75 MG/1; MG/1
100 CAPSULE ORAL 2 TIMES DAILY
Qty: 10 CAP | Refills: 0 | Status: SHIPPED | OUTPATIENT
Start: 2018-06-18 | End: 2018-06-23

## 2018-06-19 ENCOUNTER — OFFICE VISIT (OUTPATIENT)
Dept: MEDICAL GROUP | Facility: PHYSICIAN GROUP | Age: 78
End: 2018-06-19
Payer: MEDICARE

## 2018-06-19 VITALS
TEMPERATURE: 96.4 F | BODY MASS INDEX: 27.82 KG/M2 | WEIGHT: 157 LBS | RESPIRATION RATE: 18 BRPM | OXYGEN SATURATION: 95 % | HEIGHT: 63 IN | DIASTOLIC BLOOD PRESSURE: 70 MMHG | HEART RATE: 105 BPM | SYSTOLIC BLOOD PRESSURE: 120 MMHG

## 2018-06-19 DIAGNOSIS — N30.00 ACUTE CYSTITIS WITHOUT HEMATURIA: ICD-10-CM

## 2018-06-19 DIAGNOSIS — R10.31 RIGHT LOWER QUADRANT ABDOMINAL PAIN: ICD-10-CM

## 2018-06-19 DIAGNOSIS — Z12.11 ENCOUNTER FOR SCREENING FECAL OCCULT BLOOD TESTING: ICD-10-CM

## 2018-06-19 PROCEDURE — 99214 OFFICE O/P EST MOD 30 MIN: CPT | Performed by: NURSE PRACTITIONER

## 2018-06-19 NOTE — ASSESSMENT & PLAN NOTE
Reports onset of sharp right lower quad pain today.  Reports some bright red color to stools.  Had some beets last week.  Will do FIT test to rule out blood in stools.  No nausea/vomitting. No fever.

## 2018-06-19 NOTE — ASSESSMENT & PLAN NOTE
Recently completed cefdinar, culture from urgent care showed need for macrobid.  RX called in.  Will start this today. Patient reports urgency, and frequency, no burning.  Discussed at length the importance of switching to the macrobid.  Will have patient repeat UA/culture as needed after antibiotics are completed.

## 2018-06-20 ENCOUNTER — HOSPITAL ENCOUNTER (OUTPATIENT)
Facility: MEDICAL CENTER | Age: 78
End: 2018-06-20
Attending: NURSE PRACTITIONER
Payer: MEDICARE

## 2018-06-20 PROCEDURE — 82274 ASSAY TEST FOR BLOOD FECAL: CPT

## 2018-06-20 NOTE — PROGRESS NOTES
Chief Complaint   Patient presents with   • Follow-Up     UC    • UTI       Subjective:   Bailey White is a 78 y.o. female here today for evaluation and management of:    Acute cystitis without hematuria  Recently completed cefdinar, culture from urgent care showed need for macrobid.  RX called in.  Will start this today. Patient reports urgency, and frequency, no burning.  Discussed at length the importance of switching to the macrobid.  Will have patient repeat UA/culture as needed after antibiotics are completed.     Right lower quadrant abdominal pain  Reports onset of sharp right lower quad pain today.  Reports some bright red color to stools.  Had some beets last week.  Will do FIT test to rule out blood in stools.  No nausea/vomitting. No fever.            Current medicines (including changes today)  Current Outpatient Prescriptions   Medication Sig Dispense Refill   • glipiZIDE (GLUCOTROL) 5 MG Tab TAKE 1/2 TABLET TWICE DAILY 90 Tab 3   • clopidogrel (PLAVIX) 75 MG Tab TAKE 1 TABLET EVERY DAY 90 Tab 3   • omeprazole (PRILOSEC) 20 MG delayed-release capsule TAKE 1 CAPSULE EVERY DAY 90 Cap 3   • amlodipine (NORVASC) 5 MG Tab TAKE 1 TABLET EVERY DAY 90 Tab 3   • acyclovir (ZOVIRAX) 200 MG Cap Take 1 Cap by mouth every day. 90 Cap 3   • levothyroxine (SYNTHROID) 75 MCG Tab Take 1 Tab by mouth Every morning on an empty stomach. 90 Tab 3   • Blood Glucose Monitoring Suppl Device Meter: Dispense Device of Insurance Preference. Sig. Use as directed for blood sugar monitoring. #1. NR. 1 Device 0   • acetaminophen (TYLENOL) 500 MG TABS Take 500-1,000 mg by mouth every 6 hours as needed.     • Cholecalciferol (VITAMIN D) 2000 UNITS CAPS Take  by mouth every day.     • nitrofurantoin monohydr macro (MACROBID) 100 MG Cap Take 1 Cap by mouth 2 times a day for 5 days. 10 Cap 0   • Non Formulary Request as needed. OTC Restless leg       No current facility-administered medications for this visit.      She  has a  "past medical history of Anesthesia; Arthritis; Blood transfusion; Bronchitis (2011); Carotid stenosis; CATARACT; CVA (cerebral vascular accident) (HCC); Diabetes; Fibromyalgia; Fibromyalgia; GERD (gastroesophageal reflux disease); Heart burn; Herpes genital; Hiatus hernia syndrome; HTN; Indigestion; Insomnia; Migraine; Pain; Seizure (HCC); Thyroid disease; TIA; Unspecified hemorrhagic conditions; Unspecified urinary incontinence; and Urinary bladder disorder. She also has no past medical history of ASTHMA or Breast cancer ([facility]).    ROS as stated in hpi  No chest pain, no shortness of breath, no abdominal pain       Objective:     Blood pressure 120/70, pulse (!) 105, temperature (!) 35.8 °C (96.4 °F), resp. rate 18, height 1.6 m (5' 3\"), weight 71.2 kg (157 lb), last menstrual period 01/01/1983, SpO2 95 %. Body mass index is 27.81 kg/m².   Physical Exam:  Constitutional: Alert, no distress.  Skin: Warm, dry, good turgor,no cyanosis, no rashes in visible areas.  Eye: Equal, round and reactive, conjunctiva clear, lids normal.  Ears: No tenderness, no discharge.  External canals are without any significant edema or erythema.   Gross auditory acuity is intact.  Nose: symmetrical without tenderness, no discharge.  Mouth/Throat: lips without lesion.  Oropharynx clear.    Neck: Trachea midline, no masses, no obvious thyroid enlargement. Range of motion within normal limits.  Neuro: Cranial nerves 2-12 grossly intact.  No sensory deficit.  Respiratory: Unlabored respiratory effort, lungs clear to auscultation, no wheezes, no ronchi.  Cardiovascular: Normal S1, S2, no murmur, no edema.  Abdomen: Soft, mild tenderness throughout, no masses, no guarding, no rebound tenderness, no hepatosplenomegaly.  Psych: Alert and oriented x3, normal affect and mood and judgement.        Assessment and Plan:   The following treatment plan was discussed    1. Encounter for screening fecal occult blood testing  This is a new problem to me.  " Reported red stools.  Monitor and follow results.   - OCCULT BLOOD FECES IMMUNOASSAY; Future    2. Acute cystitis without hematuria  This is a new problem to me.  Acute.  Ongoing.  Follow up from urgent care.  Will start Macrobid today X5 days.  Repeat UA after completion.  Order given.  Monitor and follow.  ED precautions reviewed.   - URINALYSIS,CULTURE IF INDICATED; Future    3. Right lower quadrant abdominal pain  This is a new problem to me.  Acute.  Unknown etiology, this may represent sequale from current UTI, acute abdomonial process or bowel issue.  FIT TEST ordered.  ED precautions reviewed with patient and daughter.  They verbalize understanding. Monitor and follow.       Followup: Return in about 4 weeks (around 7/17/2018) for depression.

## 2018-06-26 ENCOUNTER — HOSPITAL ENCOUNTER (OUTPATIENT)
Dept: LAB | Facility: MEDICAL CENTER | Age: 78
End: 2018-06-26
Attending: NURSE PRACTITIONER
Payer: MEDICARE

## 2018-06-26 DIAGNOSIS — N30.00 ACUTE CYSTITIS WITHOUT HEMATURIA: ICD-10-CM

## 2018-06-26 LAB
APPEARANCE UR: CLEAR
BILIRUB UR QL STRIP.AUTO: NEGATIVE
COLOR UR: YELLOW
EPI CELLS #/AREA URNS HPF: ABNORMAL /HPF
GLUCOSE UR STRIP.AUTO-MCNC: 500 MG/DL
HYALINE CASTS #/AREA URNS LPF: ABNORMAL /LPF
KETONES UR STRIP.AUTO-MCNC: NEGATIVE MG/DL
LEUKOCYTE ESTERASE UR QL STRIP.AUTO: NEGATIVE
MICRO URNS: ABNORMAL
NITRITE UR QL STRIP.AUTO: NEGATIVE
PH UR STRIP.AUTO: 6 [PH]
PROT UR QL STRIP: 100 MG/DL
RBC UR QL AUTO: NEGATIVE
SP GR UR REFRACTOMETRY: 1.04
UROBILINOGEN UR STRIP.AUTO-MCNC: 0.2 MG/DL

## 2018-06-26 PROCEDURE — 81001 URINALYSIS AUTO W/SCOPE: CPT

## 2018-07-01 DIAGNOSIS — Z12.11 ENCOUNTER FOR SCREENING FECAL OCCULT BLOOD TESTING: ICD-10-CM

## 2018-07-02 LAB — HEMOCCULT STL QL IA: NEGATIVE

## 2018-07-03 ENCOUNTER — TELEPHONE (OUTPATIENT)
Dept: MEDICAL GROUP | Facility: PHYSICIAN GROUP | Age: 78
End: 2018-07-03

## 2018-07-03 NOTE — LETTER
July 3, 2018         Bailey Lama Destecroix  [address] [place] [address]        Dear Bailey:  Stool test is negative for blood.  This test should be repeated annually for colon cancer screening. Thank you for doing this test.   RANDY Barfield     Below are the results from your recent visit:    Resulted Orders   OCCULT BLOOD FECES IMMUNOASSAY   Result Value Ref Range    Occult Blood, IA Negative Negative       If you have any questions or concerns, please don't hesitate to call.        Sincerely,      RANDY Barfield    Electronically Signed

## 2018-07-03 NOTE — TELEPHONE ENCOUNTER
----- Message from RANDY Barfield sent at 7/2/2018  7:17 PM PDT -----  Please notify patient that stool test is negative for blood.  This test should be repeated annually for colon cancer screening. Thank you for doing this test.   RANDY Barfield

## 2018-07-12 ENCOUNTER — TELEPHONE (OUTPATIENT)
Dept: MEDICAL GROUP | Facility: PHYSICIAN GROUP | Age: 78
End: 2018-07-12

## 2018-07-12 NOTE — TELEPHONE ENCOUNTER
"· Home Health paperwork received from [facility] requiring provider signature.     · All appropriate fields completed by Medical Assistant: No    · Paperwork placed in \"MA to Provider\" folder/basket. Awaiting provider completion/signature.  "

## 2018-07-16 ENCOUNTER — OFFICE VISIT (OUTPATIENT)
Dept: MEDICAL GROUP | Facility: PHYSICIAN GROUP | Age: 78
End: 2018-07-16
Payer: MEDICARE

## 2018-07-16 VITALS
HEIGHT: 63 IN | SYSTOLIC BLOOD PRESSURE: 120 MMHG | DIASTOLIC BLOOD PRESSURE: 80 MMHG | OXYGEN SATURATION: 95 % | BODY MASS INDEX: 27.82 KG/M2 | RESPIRATION RATE: 16 BRPM | TEMPERATURE: 96.6 F | WEIGHT: 157 LBS | HEART RATE: 94 BPM

## 2018-07-16 DIAGNOSIS — Z14.8 HEMOCHROMATOSIS CARRIER: ICD-10-CM

## 2018-07-16 DIAGNOSIS — G89.29 CHRONIC PAIN OF LEFT KNEE: ICD-10-CM

## 2018-07-16 DIAGNOSIS — M54.50 CHRONIC BILATERAL LOW BACK PAIN WITHOUT SCIATICA: ICD-10-CM

## 2018-07-16 DIAGNOSIS — G89.29 CHRONIC BILATERAL LOW BACK PAIN WITHOUT SCIATICA: ICD-10-CM

## 2018-07-16 DIAGNOSIS — F32.9 REACTIVE DEPRESSION: ICD-10-CM

## 2018-07-16 DIAGNOSIS — M25.562 CHRONIC PAIN OF LEFT KNEE: ICD-10-CM

## 2018-07-16 PROCEDURE — 99215 OFFICE O/P EST HI 40 MIN: CPT | Performed by: NURSE PRACTITIONER

## 2018-07-16 NOTE — ASSESSMENT & PLAN NOTE
HaFINDINGS:The lowest formed intervertebral disc will be designated L5-S1 for the purposes of this report and vertebral levels numbered accordingly.    There is mild degenerative retrolisthesis of L1 on L2 and of L2 on L3.  There is no gross malalignment.  No acute or suspicious osseous lesion is seen.  There is loss of intervertebral disc height at L1-L2 and L2-L3.  There are Modic type I degenerative endplate changes adjacent to the L2 and L3 intervertebral disc. There are small Schmorl nodes in the inferior endplate of L2 and superior endplate of L3.  The conus medullaris has a normal caliber course and signal intensity.    Level specific findings as follows:  T12-L1: Diffuse disc bulge. Mild BILATERAL facet arthropathy.  L1-2: Diffuse disc bulge. Minimal central canal narrowing. Mild BILATERAL neural foraminal narrowing.  L2-3: Disc bulge. Mild BILATERAL facet arthropathy. Minimal central canal narrowing. Mild BILATERAL neural foraminal narrowing.  L3-4: Moderate RIGHT facet arthropathy.  L4-5: Mild to moderate BILATERAL facet arthropathy. Mild LEFT neural foraminal narrowing.  L5-S1: Small posterior disc protrusion. Moderate BILATERAL facet arthropathy. Moderate BILATERAL neural foraminal narrowing.    Soft tissues: No abdominal aortic aneurysm or soft tissue mass is seen.s had multiple back injuries in her life with ongoing back pain.

## 2018-07-17 ENCOUNTER — APPOINTMENT (OUTPATIENT)
Dept: MEDICAL GROUP | Facility: PHYSICIAN GROUP | Age: 78
End: 2018-07-17
Payer: MEDICARE

## 2018-07-17 NOTE — ASSESSMENT & PLAN NOTE
Continues to have swelling in left knee.  Would like to see another orthopedic for second opionion
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
## 2018-07-30 RX ORDER — OMEPRAZOLE 20 MG/1
CAPSULE, DELAYED RELEASE ORAL
Qty: 90 CAP | Refills: 2 | Status: SHIPPED | OUTPATIENT
Start: 2018-07-30 | End: 2019-02-13 | Stop reason: SDUPTHER

## 2018-07-30 RX ORDER — AMLODIPINE BESYLATE 5 MG/1
5 TABLET ORAL
Qty: 90 TAB | Refills: 2 | Status: SHIPPED | OUTPATIENT
Start: 2018-07-30 | End: 2018-08-22 | Stop reason: SDUPTHER

## 2018-07-30 RX ORDER — LEVOTHYROXINE SODIUM 0.07 MG/1
75 TABLET ORAL
Qty: 90 TAB | Refills: 2 | Status: SHIPPED | OUTPATIENT
Start: 2018-07-30 | End: 2019-02-13 | Stop reason: SDUPTHER

## 2018-07-30 RX ORDER — CLOPIDOGREL BISULFATE 75 MG/1
75 TABLET ORAL
Qty: 90 TAB | Refills: 2 | Status: SHIPPED | OUTPATIENT
Start: 2018-07-30 | End: 2018-08-22 | Stop reason: SDUPTHER

## 2018-07-30 RX ORDER — ACYCLOVIR 200 MG/1
200 CAPSULE ORAL
Qty: 90 CAP | Refills: 0 | Status: SHIPPED | OUTPATIENT
Start: 2018-07-30 | End: 2018-10-02 | Stop reason: SDUPTHER

## 2018-07-30 RX ORDER — GLIPIZIDE 5 MG/1
TABLET ORAL
Qty: 90 TAB | Refills: 2 | Status: SHIPPED | OUTPATIENT
Start: 2018-07-30 | End: 2018-10-29

## 2018-07-30 NOTE — TELEPHONE ENCOUNTER
Requested Prescriptions     Signed Prescriptions Disp Refills   • acyclovir (ZOVIRAX) 200 MG Cap 90 Cap 0     Sig: Take 1 Cap by mouth every day.     Authorizing Provider: PRATEEK HART   • amLODIPine (NORVASC) 5 MG Tab 90 Tab 2     Sig: Take 1 Tab by mouth every day.     Authorizing Provider: PRATEEK HART   • levothyroxine (SYNTHROID) 75 MCG Tab 90 Tab 2     Sig: Take 1 Tab by mouth Every morning on an empty stomach.     Authorizing Provider: PRATEEK HART   • glipiZIDE (GLUCOTROL) 5 MG Tab 90 Tab 2     Sig: TAKE 1/2 TABLET TWICE DAILY     Authorizing Provider: PRATEEK HART   • clopidogrel (PLAVIX) 75 MG Tab 90 Tab 2     Sig: Take 1 Tab by mouth every day.     Authorizing Provider: PRATEEK HART   • omeprazole (PRILOSEC) 20 MG delayed-release capsule 90 Cap 2     Sig: TAKE 1 CAPSULE EVERY DAY     Authorizing Provider: PRATEEK HART A.P.R.N.

## 2018-08-06 ENCOUNTER — TELEPHONE (OUTPATIENT)
Dept: MEDICAL GROUP | Facility: PHYSICIAN GROUP | Age: 78
End: 2018-08-06

## 2018-08-06 NOTE — TELEPHONE ENCOUNTER
VOICEMAIL  1. Caller Name: Bailey White                      Call Back Number: [phone] (home)     2. Message: Patient was at [facility] ER 8/3/18-8/4/18 for poss stroke, she has a follow up with Mike Garcia next week 8/14/18 but is unsure if its too far out and considering going to urgent care for this. Please advise     3. Patient approves office to leave a detailed voicemail/MyChart message: N\A

## 2018-08-06 NOTE — TELEPHONE ENCOUNTER
Is she having active symptoms at this time?  If she is stable, Seeing Mike next week will be fine.  If not, please let us know.  JACE Barfield.

## 2018-08-07 ENCOUNTER — OFFICE VISIT (OUTPATIENT)
Dept: MEDICAL GROUP | Facility: PHYSICIAN GROUP | Age: 78
End: 2018-08-07
Payer: MEDICARE

## 2018-08-07 VITALS
SYSTOLIC BLOOD PRESSURE: 128 MMHG | WEIGHT: 156 LBS | HEART RATE: 80 BPM | TEMPERATURE: 98.1 F | HEIGHT: 63 IN | DIASTOLIC BLOOD PRESSURE: 80 MMHG | BODY MASS INDEX: 27.64 KG/M2 | OXYGEN SATURATION: 95 %

## 2018-08-07 DIAGNOSIS — N30.00 ACUTE CYSTITIS WITHOUT HEMATURIA: ICD-10-CM

## 2018-08-07 DIAGNOSIS — I63.9 CEREBROVASCULAR ACCIDENT (CVA), UNSPECIFIED MECHANISM (HCC): ICD-10-CM

## 2018-08-07 PROCEDURE — 99215 OFFICE O/P EST HI 40 MIN: CPT | Performed by: NURSE PRACTITIONER

## 2018-08-07 RX ORDER — CHLORAL HYDRATE 500 MG
1000 CAPSULE ORAL
COMMUNITY
End: 2020-10-05

## 2018-08-07 RX ORDER — NITROFURANTOIN 25; 75 MG/1; MG/1
100 CAPSULE ORAL 2 TIMES DAILY
Qty: 10 CAP | Refills: 0 | Status: SHIPPED | OUTPATIENT
Start: 2018-08-07 | End: 2018-08-22

## 2018-08-07 ASSESSMENT — PAIN SCALES - GENERAL: PAINLEVEL: NO PAIN

## 2018-08-07 NOTE — ASSESSMENT & PLAN NOTE
Had recent episode of facial drooping, slurred speech,  MRI, echocardiagram.  Having some residual speech difficulties per patient and daughter.  Requesting records [facility].

## 2018-08-07 NOTE — LETTER
[facility]  RANDY Barfield  [address] [place] [address]  Fax: [phone]   Authorization for Release/Disclosure of   Protected Health Information   Name: TARA JAMES : 1940 SSN: [id number]   Address: [address] [place] [address] [place] [address] Phone:    [phone] (home) [phone] (work)   I authorize the entity listed below to release/disclose the PHI below to:   [facility]/RANDY Barfield and RANDY Barfield   Provider or Entity Name:  [facility]   Address   City, [facility]   Phone:  [phone]    Fax:  [phone]   Reason for request: continuity of care   Information to be released:    [  ] LAST COLONOSCOPY,  including any PATH REPORT and follow-up  [  ] LAST FIT/COLOGUARD RESULT [  ] LAST DEXA  [  ] LAST MAMMOGRAM  [  ] LAST PAP  [  ] LAST LABS [  ] RETINA EXAM REPORT  [  ] IMMUNIZATION RECORDS  [xx ] Release all info on all diagnostic test done in ER      [  ] Check here and initial the line next to each item to release ALL health information INCLUDING  _____ Care and treatment for drug and / or alcohol abuse  _____ HIV testing, infection status, or AIDS  _____ Genetic Testing    DATES OF SERVICE OR TIME PERIOD TO BE DISCLOSED: _____________  I understand and acknowledge that:  * This Authorization may be revoked at any time by you in writing, except if your health information has already been used or disclosed.  * Your health information that will be used or disclosed as a result of you signing this authorization could be re-disclosed by the recipient. If this occurs, your re-disclosed health information may no longer be protected by State or Federal laws.  * You may refuse to sign this Authorization. Your refusal will not affect your ability to obtain treatment.  * This Authorization becomes effective upon signing and will  on (date) __________.      If no date is indicated, this Authorization will  one (1)  year from the signature date.    Name: Bailey Lama Ana Cristinaroethan    Signature:   Date:     8/7/2018       PLEASE FAX REQUESTED RECORDS BACK TO: [phone]

## 2018-08-07 NOTE — ASSESSMENT & PLAN NOTE
"While in the hospital had UTI.  Started on Cipro and is feeling that her \"tendons are shortening\".  Will stop.  Awaiting records from [facility].    "

## 2018-08-08 ENCOUNTER — PATIENT OUTREACH (OUTPATIENT)
Dept: HEALTH INFORMATION MANAGEMENT | Facility: OTHER | Age: 78
End: 2018-08-08

## 2018-08-08 NOTE — PROGRESS NOTES
"Chief Complaint   Patient presents with   • Follow-Up     hospital       Subjective:   Bailey White is a 78 y.o. female here today for evaluation and management of:    CVA (Cerebral Vascular Accident), h/o  Had recent episode of facial drooping, slurred speech,  MRI, echocardiagram.  Having some residual speech difficulties per patient and daughter.  Requesting records [facility].      Acute cystitis without hematuria  While in the hospital had UTI.  Started on Cipro and is feeling that her \"tendons are shortening\".  Will stop.  Awaiting records from [facility].           Current medicines (including changes today)  Current Outpatient Prescriptions   Medication Sig Dispense Refill   • Magnesium Gluconate (MAGNESIUM 27 PO) Take  by mouth.     • Omega-3 Fatty Acids (FISH OIL) 1000 MG Cap capsule Take 1,000 mg by mouth 3 times a day, with meals.     • nitrofurantoin monohydr macro (MACROBID) 100 MG Cap Take 1 Cap by mouth 2 times a day. 10 Cap 0   • acyclovir (ZOVIRAX) 200 MG Cap Take 1 Cap by mouth every day. 90 Cap 0   • amLODIPine (NORVASC) 5 MG Tab Take 1 Tab by mouth every day. 90 Tab 2   • levothyroxine (SYNTHROID) 75 MCG Tab Take 1 Tab by mouth Every morning on an empty stomach. 90 Tab 2   • glipiZIDE (GLUCOTROL) 5 MG Tab TAKE 1/2 TABLET TWICE DAILY 90 Tab 2   • clopidogrel (PLAVIX) 75 MG Tab Take 1 Tab by mouth every day. 90 Tab 2   • omeprazole (PRILOSEC) 20 MG delayed-release capsule TAKE 1 CAPSULE EVERY DAY 90 Cap 2   • Docusate Sodium (COLACE PO) Take  by mouth.     • Blood Glucose Monitoring Suppl Device Meter: Dispense Device of Insurance Preference. Sig. Use as directed for blood sugar monitoring. #1. NR. 1 Device 0   • acetaminophen (TYLENOL) 500 MG TABS Take 500-1,000 mg by mouth every 6 hours as needed.     • Cholecalciferol (VITAMIN D) 2000 UNITS CAPS Take  by mouth every day.     • Non Formulary Request as needed. OTC Restless leg       No current facility-administered medications for this visit.  " "    She  has a past medical history of Anesthesia; Arthritis; Blood transfusion; Bronchitis (2011); Carotid stenosis; CATARACT; CVA (cerebral vascular accident) (HCC); Diabetes; Fibromyalgia; Fibromyalgia; GERD (gastroesophageal reflux disease); Heart burn; Herpes genital; Hiatus hernia syndrome; HTN; Indigestion; Insomnia; Migraine; Pain; Seizure (HCC); Thyroid disease; TIA; Unspecified hemorrhagic conditions; Unspecified urinary incontinence; and Urinary bladder disorder. She also has no past medical history of ASTHMA or Breast cancer ([facility]).    ROS as stated in hpi  No chest pain, no shortness of breath, no abdominal pain       Objective:     Blood pressure 128/80, pulse 80, temperature 36.7 °C (98.1 °F), height 1.6 m (5' 3\"), weight 70.8 kg (156 lb), last menstrual period 01/01/1983, SpO2 95 %. Body mass index is 27.63 kg/m². stable.  Daughter accompanies her today. There is tension in the relationship.   Physical Exam:  Constitutional: Alert, no distress.  Skin: Warm, dry, good turgor,no cyanosis, no rashes in visible areas.  Eye: Equal, round and reactive, conjunctiva clear, lids normal.  Ears: No tenderness, no discharge.  External canals are without any significant edema or erythema.  Gross auditory acuity is intact.  Nose: symmetrical without tenderness, no discharge.  Mouth/Throat: lips without lesion.  Oropharynx clear.   Neck: Trachea midline, no masses, no obvious thyroid enlargement.. . Range of motion within normal limits.  Neuro: Cranial nerves 2-12 grossly intact.  No sensory deficit.  Respiratory: Unlabored respiratory effort, lungs clear to auscultation, no wheezes, no ronchi.  Cardiovascular: Normal S1, S2, no murmur, no edema.  Abdomen: Soft, non-tender, no masses, no guarding,  no hepatosplenomegaly.  Psych: Alert and oriented x3, normal affect and mood and judgement.        Assessment and Plan:   The following treatment plan was discussed    1. Cerebrovascular accident (CVA), unspecified " mechanism (HCC)  This is a new problem to me. Acute TIA at [facility].  Awaiting records.  Urgent referral to neurology and cardiology for follow up.  Patient refuses statin at this time.  Patient does not exhibit residual stroke symptoms at this time.  Referral placed for complex care management to evaluate home setting and possible care needs as patient is home alone during the day.  Tension in relationship with daughter who is the caregiver. Lengthy discussion regarding plan of care, monitoring of symptoms.  - REFERRAL TO CARDIOLOGY  - REFERRAL TO NEUROLOGY  - REFERRAL TO COMPLEX CARE MANAGEMENT Services Requested: Care Manager to Evaluate and Recommend    2. Acute cystitis without hematuria  This is a new problem to me. Acute.  Discovered at recent hospitalization.  Started on Cipro, causing tendon pain.  DC Cipro.  Macrobid started.  Have requested [facility] records to see culture results.  Patient to return in 2 weeks for repeat UA/culture if needed.  Lengthy discussion over preventative measures.  Patient and daughter, who accompanies her have differing views and stories.  See #1    Total 40 minutes face-to-face time spent with patient, with greater than 50% of the total time discussing patient's issues and symptoms as listed above in assessment and plan, as well as managing coordination of care for future evaluation and treatment.        Followup: Return in about 2 weeks (around 8/21/2018) for UTI/TIA.

## 2018-08-08 NOTE — PROGRESS NOTES
Initial Outreach from Care Coordination . Contacted pt via phone to discuss role of social service care coordination and patient’s identified social needs. Referral received from PCP Elena regarding evaluation for service that may be helpful in the home.   Pt from [place]. Specific interest is in  services (socializing, intellectual interest, playing games). Pt previously attended [facility] activities but is uncomfortable attending at this time.   Additional medical information provided:  Pt reports recovery from knee surgery, ongoing UTIs and poorly managed diabetes (A1C of 7 per pt report). Pt declined CC Pharm and CC RN services at this time.   Assessment completed as follows:   Financial limitations/difficulties  SS $1,825/mo (pt denies paying $135/mo. Pt repots $200/mo of free funds. ,   Food insecurities  Denies food safety concer. Pt’s son-in-law is cook, provide food daily. Pt independently cooks during daytime as kin work daily.  Lack of housing/environmental issues  Denies housing concerns.  Transportation  Pt transported via kin and has RTC Access established  Social isolation/support/(NOK) next-of-kin  Pt has 5 sons and 1 daughter. Resides with daughter and son-in-law.   Loss of independence-need assistance with ADLs/IADLs  Pt reports needing assistance with toileting, walking, stairs, exercise, shopping, housework, and transportation  Advanced life planning  On file.   Plan  Referral to Senior  Program  Referral to [facility]  TC to pt at later time/date

## 2018-08-09 ENCOUNTER — PATIENT OUTREACH (OUTPATIENT)
Dept: HEALTH INFORMATION MANAGEMENT | Facility: OTHER | Age: 78
End: 2018-08-09

## 2018-08-09 NOTE — PROGRESS NOTES
TC from [facility] Nicanor Muro who reports pt is over income for program.     Plan:  Continue with [facility] referral.

## 2018-08-20 ENCOUNTER — TELEPHONE (OUTPATIENT)
Dept: MEDICAL GROUP | Facility: PHYSICIAN GROUP | Age: 78
End: 2018-08-20

## 2018-08-20 NOTE — TELEPHONE ENCOUNTER
"· DME paperwork received from [facility] requiring provider signature.     · All appropriate fields completed by Medical Assistant: No    · Paperwork placed in \"MA to Provider\" folder/basket. Awaiting provider completion/signature.  "

## 2018-08-20 NOTE — TELEPHONE ENCOUNTER
Future Appointments       Provider Department Center    8/21/2018 3:40 PM RANDY Barfield [facility] [place]    8/22/2018 3:40 PM KAILEY Paula. [facility]       ESTABLISHED PATIENT PRE-VISIT PLANNING     Note: Patient will not be contacted if there is no indication to call.     1.  Reviewed notes from the last few office visits within the medical group: Yes 8/7/18    2.  If any orders were placed at last visit or intended to be done for this visit (i.e. 6 mos follow-up), do we have Results/Consult Notes?        •  Labs - Labs were not ordered at last office visit.       •  Imaging - Imaging was not ordered at last office visit.       •  Referrals - Referral ordered, patient was seen and consult notes are in chart. Care Teams updated  YES.    3. Is this appointment scheduled as a Hospital Follow-Up? No    4.  Immunizations were updated in Epic using WebIZ?: No WebIZ record       •  Web Iz Recommendations: No WebIZ record    5.  Patient is due for the following Health Maintenance Topics:   Health Maintenance Due   Topic Date Due   • Annual Wellness Visit  1940   • RETINAL SCREENING  03/18/1956   • IMM ZOSTER VACCINES (1 of 2) 03/18/1988   • DIABETES MONOFILAMENT / LE EXAM  11/14/2014   • A1C SCREENING  08/08/2018       - Patient plans to schedule appointment for Retinal Eye Exam.    6.  MDX printed for Provider? NO MCR    7.  Patient was NOT informed to arrive 15 min prior to their scheduled appointment and bring in their medication bottles.

## 2018-08-21 ENCOUNTER — OFFICE VISIT (OUTPATIENT)
Dept: MEDICAL GROUP | Facility: PHYSICIAN GROUP | Age: 78
End: 2018-08-21
Payer: MEDICARE

## 2018-08-21 VITALS
SYSTOLIC BLOOD PRESSURE: 122 MMHG | BODY MASS INDEX: 27.64 KG/M2 | DIASTOLIC BLOOD PRESSURE: 60 MMHG | TEMPERATURE: 97 F | HEIGHT: 63 IN | WEIGHT: 156 LBS | HEART RATE: 82 BPM | RESPIRATION RATE: 18 BRPM | OXYGEN SATURATION: 96 %

## 2018-08-21 DIAGNOSIS — N39.0 CHRONIC UTI: ICD-10-CM

## 2018-08-21 DIAGNOSIS — N39.490 OVERFLOW INCONTINENCE OF URINE: ICD-10-CM

## 2018-08-21 DIAGNOSIS — E11.9 TYPE 2 DIABETES MELLITUS WITHOUT COMPLICATION, WITHOUT LONG-TERM CURRENT USE OF INSULIN (HCC): ICD-10-CM

## 2018-08-21 DIAGNOSIS — E11.9 DIABETES MELLITUS TYPE 2, NONINSULIN DEPENDENT (HCC): ICD-10-CM

## 2018-08-21 DIAGNOSIS — N30.00 ACUTE CYSTITIS WITHOUT HEMATURIA: ICD-10-CM

## 2018-08-21 PROCEDURE — 99214 OFFICE O/P EST MOD 30 MIN: CPT | Performed by: NURSE PRACTITIONER

## 2018-08-21 NOTE — PROGRESS NOTES
Chief Complaint   Patient presents with   • Follow-Up     UTI   • Medication Management       Subjective:   Bailey White is a 78 y.o. female here today for evaluation and management of:    Acute cystitis without hematuria  Unable to urinate today.  Will give lab slip.  Afebrile. Asymptomatic at this time.      Overflow incontinence of urine  Ongoing issue.  Here today to review supply order.  Using pull ups (large) and pads.  Orders completed and faxed to medical supply office.     Diabetes mellitus type 2, noninsulin dependent  Due for labs.  States blood sugars are running in the 130's.  Currently taking glipizide daily.  Will monitor and follow.      Accompanied by daughterNaila    Current medicines (including changes today)  Current Outpatient Prescriptions   Medication Sig Dispense Refill   • Magnesium Gluconate (MAGNESIUM 27 PO) Take  by mouth.     • Omega-3 Fatty Acids (FISH OIL) 1000 MG Cap capsule Take 1,000 mg by mouth 3 times a day, with meals.     • acyclovir (ZOVIRAX) 200 MG Cap Take 1 Cap by mouth every day. 90 Cap 0   • amLODIPine (NORVASC) 5 MG Tab Take 1 Tab by mouth every day. 90 Tab 2   • levothyroxine (SYNTHROID) 75 MCG Tab Take 1 Tab by mouth Every morning on an empty stomach. 90 Tab 2   • glipiZIDE (GLUCOTROL) 5 MG Tab TAKE 1/2 TABLET TWICE DAILY 90 Tab 2   • clopidogrel (PLAVIX) 75 MG Tab Take 1 Tab by mouth every day. 90 Tab 2   • omeprazole (PRILOSEC) 20 MG delayed-release capsule TAKE 1 CAPSULE EVERY DAY 90 Cap 2   • Docusate Sodium (COLACE PO) Take  by mouth.     • Blood Glucose Monitoring Suppl Device Meter: Dispense Device of Insurance Preference. Sig. Use as directed for blood sugar monitoring. #1. NR. 1 Device 0   • acetaminophen (TYLENOL) 500 MG TABS Take 500-1,000 mg by mouth every 6 hours as needed.     • Cholecalciferol (VITAMIN D) 2000 UNITS CAPS Take  by mouth every day.     • Non Formulary Request as needed. OTC Restless leg     • nitrofurantoin monohydr macro  "(MACROBID) 100 MG Cap Take 1 Cap by mouth 2 times a day. 10 Cap 0     No current facility-administered medications for this visit.      She  has a past medical history of Anesthesia; Arthritis; Blood transfusion; Bronchitis (2011); Carotid stenosis; CATARACT; CVA (cerebral vascular accident) ([facility]); Diabetes; Fibromyalgia; Fibromyalgia; GERD (gastroesophageal reflux disease); Heart burn; Herpes genital; Hiatus hernia syndrome; HTN; Indigestion; Insomnia; Migraine; Pain; Seizure ([facility]); Thyroid disease; TIA; Unspecified hemorrhagic conditions; Unspecified urinary incontinence; and Urinary bladder disorder. She also has no past medical history of ASTHMA or Breast cancer ([facility]).    ROS as stated in hpi  No chest pain, no shortness of breath, no abdominal pain       Objective:     Blood pressure 122/60, pulse 82, temperature 36.1 °C (97 °F), resp. rate 18, height 1.6 m (5' 3\"), weight 70.8 kg (156 lb), last menstrual period 01/01/1983, SpO2 96 %. Body mass index is 27.63 kg/m². stable  Physical Exam:  Constitutional: Alert, no distress.  Skin: Warm, dry, good turgor,no cyanosis, no rashes in visible areas.  Eye: Equal, round and reactive, conjunctiva clear, lids normal.  Ears: No tenderness, no discharge.  External canals are without any significant edema or erythema.  Gross auditory acuity is intact.  Nose: symmetrical without tenderness, no discharge.  Mouth/Throat: lips without lesion.  Oropharynx clear  Neck: Trachea midline, no masses, no obvious thyroid enlargement.. . Range of motion within normal limits.  Neuro: Cranial nerves 2-12 grossly intact.  No sensory deficit.  Respiratory: Unlabored respiratory effort, lungs clear to auscultation, no wheezes, no ronchi.  Cardiovascular: Normal S1, S2, no murmur, no edema.  :  Chronic ongoing incontinence.  Pad in place  Psych: Alert and oriented x3, normal affect and mood and judgement.        Assessment and Plan:   The following treatment plan was discussed    1. Type 2 " diabetes mellitus without complication, without long-term current use of insulin (HCC)  Chronic, ongoing.  Labs are due.  Continue with glipizide and watching sugars/sweets.  Monitor and follow results.   - CBC WITH DIFFERENTIAL; Future  - COMP METABOLIC PANEL; Future  - HEMOGLOBIN A1C; Future  - LIPID PROFILE; Future    2. Chronic UTI  Chronic, ongoing.  Unable to urinate at appintment today for follow up.  Lab slip given.  Monitor and follow results.   - URINALYSIS,CULTURE IF INDICATED; Future    3. Acute cystitis without hematuria  See #2    4. Overflow incontinence of urine  This is a new problem to me.  Chronic, ongoing.  Incontinence supplies reordered today. Monitor.     5. Diabetes mellitus type 2, noninsulin dependent (HCC)  See #1      Followup: Return in about 2 months (around 10/21/2018) for Diabetes.         Educated in proper administration of medication(s) ordered today including safety, possible SE, risks, benefits, rationale and alternatives to therapy.     Please note that this dictation was created using voice recognition software. I have made every reasonable attempt to correct obvious errors, but I expect that there are errors of grammar and possibly content that I did not discover before finalizing the note.

## 2018-08-21 NOTE — ASSESSMENT & PLAN NOTE
Ongoing issue.  Here today to review supply order.  Using pull ups (large) and pads.  Orders completed and faxed to medical supply office.

## 2018-08-21 NOTE — ASSESSMENT & PLAN NOTE
Due for labs.  States blood sugars are running in the 130's.  Currently taking glipizide daily.  Will monitor and follow.

## 2018-08-22 ENCOUNTER — OFFICE VISIT (OUTPATIENT)
Dept: CARDIOLOGY | Facility: MEDICAL CENTER | Age: 78
End: 2018-08-22
Payer: MEDICARE

## 2018-08-22 ENCOUNTER — HOSPITAL ENCOUNTER (OUTPATIENT)
Dept: LAB | Facility: MEDICAL CENTER | Age: 78
End: 2018-08-22
Attending: NURSE PRACTITIONER
Payer: MEDICARE

## 2018-08-22 VITALS
BODY MASS INDEX: 27.82 KG/M2 | HEART RATE: 80 BPM | SYSTOLIC BLOOD PRESSURE: 134 MMHG | OXYGEN SATURATION: 96 % | DIASTOLIC BLOOD PRESSURE: 54 MMHG | RESPIRATION RATE: 12 BRPM | HEIGHT: 63 IN | WEIGHT: 157 LBS

## 2018-08-22 DIAGNOSIS — E78.2 MIXED HYPERLIPIDEMIA: ICD-10-CM

## 2018-08-22 DIAGNOSIS — E11.9 TYPE 2 DIABETES MELLITUS WITHOUT COMPLICATION, WITHOUT LONG-TERM CURRENT USE OF INSULIN (HCC): ICD-10-CM

## 2018-08-22 DIAGNOSIS — I10 ESSENTIAL HYPERTENSION: ICD-10-CM

## 2018-08-22 DIAGNOSIS — G45.8 OTHER SPECIFIED TRANSIENT CEREBRAL ISCHEMIAS: Primary | ICD-10-CM

## 2018-08-22 DIAGNOSIS — N39.0 CHRONIC UTI: ICD-10-CM

## 2018-08-22 LAB
ALBUMIN SERPL BCP-MCNC: 4.2 G/DL (ref 3.2–4.9)
ALBUMIN/GLOB SERPL: 1.3 G/DL
ALP SERPL-CCNC: 107 U/L (ref 30–99)
ALT SERPL-CCNC: 33 U/L (ref 2–50)
ANION GAP SERPL CALC-SCNC: 8 MMOL/L (ref 0–11.9)
APPEARANCE UR: CLEAR
AST SERPL-CCNC: 21 U/L (ref 12–45)
BASOPHILS # BLD AUTO: 0.4 % (ref 0–1.8)
BASOPHILS # BLD: 0.02 K/UL (ref 0–0.12)
BILIRUB SERPL-MCNC: 0.4 MG/DL (ref 0.1–1.5)
BILIRUB UR QL STRIP.AUTO: NEGATIVE
BUN SERPL-MCNC: 22 MG/DL (ref 8–22)
CALCIUM SERPL-MCNC: 9.7 MG/DL (ref 8.5–10.5)
CHLORIDE SERPL-SCNC: 105 MMOL/L (ref 96–112)
CHOLEST SERPL-MCNC: 254 MG/DL (ref 100–199)
CO2 SERPL-SCNC: 24 MMOL/L (ref 20–33)
COLOR UR: YELLOW
CREAT SERPL-MCNC: 0.88 MG/DL (ref 0.5–1.4)
EOSINOPHIL # BLD AUTO: 0.22 K/UL (ref 0–0.51)
EOSINOPHIL NFR BLD: 3.9 % (ref 0–6.9)
ERYTHROCYTE [DISTWIDTH] IN BLOOD BY AUTOMATED COUNT: 53.8 FL (ref 35.9–50)
EST. AVERAGE GLUCOSE BLD GHB EST-MCNC: 186 MG/DL
GLOBULIN SER CALC-MCNC: 3.2 G/DL (ref 1.9–3.5)
GLUCOSE SERPL-MCNC: 156 MG/DL (ref 65–99)
GLUCOSE UR STRIP.AUTO-MCNC: NEGATIVE MG/DL
HBA1C MFR BLD: 8.1 % (ref 0–5.6)
HCT VFR BLD AUTO: 46.4 % (ref 37–47)
HDLC SERPL-MCNC: 47 MG/DL
HGB BLD-MCNC: 14.8 G/DL (ref 12–16)
IMM GRANULOCYTES # BLD AUTO: 0.01 K/UL (ref 0–0.11)
IMM GRANULOCYTES NFR BLD AUTO: 0.2 % (ref 0–0.9)
KETONES UR STRIP.AUTO-MCNC: NEGATIVE MG/DL
LDLC SERPL CALC-MCNC: 176 MG/DL
LEUKOCYTE ESTERASE UR QL STRIP.AUTO: NEGATIVE
LYMPHOCYTES # BLD AUTO: 2.43 K/UL (ref 1–4.8)
LYMPHOCYTES NFR BLD: 43.1 % (ref 22–41)
MCH RBC QN AUTO: 33 PG (ref 27–33)
MCHC RBC AUTO-ENTMCNC: 31.9 G/DL (ref 33.6–35)
MCV RBC AUTO: 103.3 FL (ref 81.4–97.8)
MICRO URNS: NORMAL
MONOCYTES # BLD AUTO: 0.53 K/UL (ref 0–0.85)
MONOCYTES NFR BLD AUTO: 9.4 % (ref 0–13.4)
NEUTROPHILS # BLD AUTO: 2.43 K/UL (ref 2–7.15)
NEUTROPHILS NFR BLD: 43 % (ref 44–72)
NITRITE UR QL STRIP.AUTO: NEGATIVE
NRBC # BLD AUTO: 0 K/UL
NRBC BLD-RTO: 0 /100 WBC
PH UR STRIP.AUTO: 5.5 [PH]
PLATELET # BLD AUTO: 265 K/UL (ref 164–446)
PMV BLD AUTO: 10.2 FL (ref 9–12.9)
POTASSIUM SERPL-SCNC: 4.4 MMOL/L (ref 3.6–5.5)
PROT SERPL-MCNC: 7.4 G/DL (ref 6–8.2)
PROT UR QL STRIP: NEGATIVE MG/DL
RBC # BLD AUTO: 4.49 M/UL (ref 4.2–5.4)
RBC UR QL AUTO: NEGATIVE
SODIUM SERPL-SCNC: 137 MMOL/L (ref 135–145)
SP GR UR STRIP.AUTO: 1.03
TRIGL SERPL-MCNC: 157 MG/DL (ref 0–149)
UROBILINOGEN UR STRIP.AUTO-MCNC: 0.2 MG/DL
WBC # BLD AUTO: 5.6 K/UL (ref 4.8–10.8)

## 2018-08-22 PROCEDURE — 99214 OFFICE O/P EST MOD 30 MIN: CPT | Performed by: NURSE PRACTITIONER

## 2018-08-22 PROCEDURE — 81003 URINALYSIS AUTO W/O SCOPE: CPT

## 2018-08-22 PROCEDURE — 36415 COLL VENOUS BLD VENIPUNCTURE: CPT

## 2018-08-22 PROCEDURE — 80053 COMPREHEN METABOLIC PANEL: CPT

## 2018-08-22 PROCEDURE — 80061 LIPID PANEL: CPT

## 2018-08-22 PROCEDURE — 83036 HEMOGLOBIN GLYCOSYLATED A1C: CPT | Mod: GA

## 2018-08-22 PROCEDURE — 85025 COMPLETE CBC W/AUTO DIFF WBC: CPT

## 2018-08-22 RX ORDER — CLOPIDOGREL BISULFATE 75 MG/1
75 TABLET ORAL
Qty: 90 TAB | Refills: 3 | Status: SHIPPED | OUTPATIENT
Start: 2018-08-22 | End: 2019-08-01 | Stop reason: SDUPTHER

## 2018-08-22 RX ORDER — ROSUVASTATIN CALCIUM 5 MG/1
5 TABLET, COATED ORAL EVERY EVENING
Qty: 30 TAB | Refills: 11 | Status: SHIPPED | OUTPATIENT
Start: 2018-08-22 | End: 2018-08-22 | Stop reason: SDUPTHER

## 2018-08-22 RX ORDER — AMLODIPINE BESYLATE 5 MG/1
5 TABLET ORAL
Qty: 90 TAB | Refills: 3 | Status: SHIPPED | OUTPATIENT
Start: 2018-08-22 | End: 2019-08-01 | Stop reason: SDUPTHER

## 2018-08-22 RX ORDER — GINSENG 100 MG
1 CAPSULE ORAL 2 TIMES DAILY
COMMUNITY

## 2018-08-22 RX ORDER — ROSUVASTATIN CALCIUM 5 MG/1
5 TABLET, COATED ORAL EVERY EVENING
Qty: 30 TAB | Refills: 11 | Status: SHIPPED | OUTPATIENT
Start: 2018-08-22 | End: 2019-06-03

## 2018-08-22 ASSESSMENT — ENCOUNTER SYMPTOMS
DIZZINESS: 0
PND: 0
ORTHOPNEA: 0
ABDOMINAL PAIN: 0
CLAUDICATION: 0
COUGH: 0
MYALGIAS: 0
SHORTNESS OF BREATH: 1
PALPITATIONS: 0
WEAKNESS: 0

## 2018-08-22 NOTE — TELEPHONE ENCOUNTER
Orders completed and faxed with chart notes to [facility]

## 2018-08-22 NOTE — PROGRESS NOTES
Chief Complaint   Patient presents with   • Transient Ischemic Attack       Subjective:   Bailey (sonya Rendon-boom) Daina White is a 78 y.o. female who presents today with her daughter, Scott, for hospital follow-up.    She had an episode of slurred speech and some right-sided weakness and presented to [facility].  CT of the brain and MRI were negative for any acute events.  She did have some small vessel disease per MRI.  Her symptoms resolved while in the emergency room.    She has a history of previous TIAs and a CVA in the past.  She does not have coronary disease but has elevated lipids and has not wanted to take statins in the past.    Since being out of the hospital, she is felt generally well with the exception of some pain in her knee replacements and some chronic back pain.  She denies any chest discomfort or palpitations.  She feels short of breath with exertion.  She does not exercise on a regular basis.    Past Medical History:   Diagnosis Date   • Anesthesia     woke up during foot surgery   • Arthritis    • Blood transfusion    • Bronchitis 2011   • Carotid stenosis    • CATARACT     repaired   • CVA (cerebral vascular accident) (HCC)    • Diabetes     off metformin diet controlled   • Fibromyalgia    • Fibromyalgia    • GERD (gastroesophageal reflux disease)    • Heart burn    • Herpes genital    • Hiatus hernia syndrome    • HTN    • Indigestion    • Insomnia    • Migraine    • Pain    • Seizure (HCC)     last seizure 2010   • Thyroid disease    • TIA     multiple   • Unspecified hemorrhagic conditions     on Plavix   • Unspecified urinary incontinence    • Urinary bladder disorder     incontinence     Past Surgical History:   Procedure Laterality Date   • HYSTEROSCOPY WITH VIDEO DIAGNOSTIC  9/5/2013    Performed by Portillo Barragan M.D. at SURGERY SAME DAY [facility] ORS   • DILATION AND CURETTAGE  9/5/2013    Performed by Portillo Barragan M.D. at SURGERY SAME DAY [facility]  ORS   • OTHER      cataracts   • OTHER ORTHOPEDIC SURGERY     • TONSILLECTOMY       Family History   Problem Relation Age of Onset   • Heart Disease Father    • Cancer Maternal Grandmother      Social History     Social History   • Marital status: Single     Spouse name: N/A   • Number of children: N/A   • Years of education: N/A     Occupational History   • Not on file.     Social History Main Topics   • Smoking status: Former Smoker     Packs/day: 3.00     Years: 1.00     Types: Cigarettes     Quit date: 1/1/1955   • Smokeless tobacco: Never Used   • Alcohol use No   • Drug use: No   • Sexual activity: No     Other Topics Concern   • Not on file     Social History Narrative   • No narrative on file     Allergies   Allergen Reactions   • Asa [Aspirin]      GI bleeding   • Ciprofloxacin Rash   • Clindamycin Hives   • Codeine Vomiting   • Food Anxiety     Yellow dye, peanuts and peanut products, all peppers,tomato, potato,wheat, gluten   • Hydrocortisone      Suicidal after Cortisone injection   • Ibuprofen      Abdominal pain   • Imitrex [Sumatriptan Succinate] Vomiting   • Inderal [Propranolol Hcl]      Not sure of reaction   • Lyrica      suicidal   • Mellaril      Psychotic episode   • Other Drug      All mycins,cillins,and cyclines   • Pcn [Penicillins] Hives   • Ropinirole Hcl [Requip] Shortness of Breath   • Shellfish Allergy      headaches   • Sulfa Drugs Itching   • Tagamet [Cimetidine]      Mental confusion     Outpatient Encounter Prescriptions as of 8/22/2018   Medication Sig Dispense Refill   • Cranberry 200 MG Cap Take  by mouth 2 Times a Day.     • clopidogrel (PLAVIX) 75 MG Tab Take 1 Tab by mouth every day. 90 Tab 3   • amLODIPine (NORVASC) 5 MG Tab Take 1 Tab by mouth every day. 90 Tab 3   • rosuvastatin (CRESTOR) 5 MG Tab Take 1 Tab by mouth every evening. 30 Tab 11   • Magnesium Gluconate (MAGNESIUM 27 PO) Take  by mouth.     • Omega-3 Fatty Acids (FISH OIL) 1000 MG Cap capsule Take 1,000 mg by  "mouth 3 times a day, with meals.     • acyclovir (ZOVIRAX) 200 MG Cap Take 1 Cap by mouth every day. 90 Cap 0   • levothyroxine (SYNTHROID) 75 MCG Tab Take 1 Tab by mouth Every morning on an empty stomach. 90 Tab 2   • glipiZIDE (GLUCOTROL) 5 MG Tab TAKE 1/2 TABLET TWICE DAILY 90 Tab 2   • omeprazole (PRILOSEC) 20 MG delayed-release capsule TAKE 1 CAPSULE EVERY DAY 90 Cap 2   • Docusate Sodium (COLACE PO) Take  by mouth.     • Blood Glucose Monitoring Suppl Device Meter: Dispense Device of Insurance Preference. Sig. Use as directed for blood sugar monitoring. #1. NR. 1 Device 0   • acetaminophen (TYLENOL) 500 MG TABS Take 500-1,000 mg by mouth every 6 hours as needed.     • Cholecalciferol (VITAMIN D) 2000 UNITS CAPS Take  by mouth every day.     • [DISCONTINUED] rosuvastatin (CRESTOR) 5 MG Tab Take 1 Tab by mouth every evening. 30 Tab 11   • [DISCONTINUED] nitrofurantoin monohydr macro (MACROBID) 100 MG Cap Take 1 Cap by mouth 2 times a day. 10 Cap 0   • [DISCONTINUED] amLODIPine (NORVASC) 5 MG Tab Take 1 Tab by mouth every day. 90 Tab 2   • [DISCONTINUED] clopidogrel (PLAVIX) 75 MG Tab Take 1 Tab by mouth every day. 90 Tab 2   • [DISCONTINUED] Non Formulary Request as needed. OTC Restless leg       No facility-administered encounter medications on file as of 8/22/2018.      Review of Systems   Constitutional: Negative for malaise/fatigue.   Respiratory: Positive for shortness of breath (Exertion.). Negative for cough.    Cardiovascular: Negative for chest pain, palpitations, orthopnea, claudication, leg swelling and PND.   Gastrointestinal: Negative for abdominal pain.   Musculoskeletal: Positive for joint pain (left knee pain). Negative for myalgias.   Neurological: Negative for dizziness and weakness.        Objective:   /54   Pulse 80   Resp 12   Ht 1.6 m (5' 3\")   Wt 71.2 kg (157 lb)   LMP 01/01/1983   SpO2 96%   BMI 27.81 kg/m²     Physical Exam   Constitutional: She is oriented to person, place, " and time. She appears well-developed and well-nourished.   HENT:   Head: Normocephalic.   Eyes: EOM are normal.   Neck: No JVD present.   Cardiovascular: Normal rate, regular rhythm and normal heart sounds.    Pulmonary/Chest: Effort normal and breath sounds normal.   Abdominal: Soft. Bowel sounds are normal.   Musculoskeletal: She exhibits no edema.   Neurological: She is alert and oriented to person, place, and time.   Strength is intact to all 4 extremities with exception of slight weakness in her left leg which may be due to her knee replacement.   Skin: Skin is warm and dry.   Psychiatric: She has a normal mood and affect.     Results for TARA JAMES (MRN [id number])    Ref. Range 8/22/2018 07:17   WBC Latest Ref Range: 4.8 - 10.8 K/uL 5.6   RBC Latest Ref Range: 4.20 - 5.40 M/uL 4.49   Hemoglobin Latest Ref Range: 12.0 - 16.0 g/dL 14.8   Hematocrit Latest Ref Range: 37.0 - 47.0 % 46.4   MCV Latest Ref Range: 81.4 - 97.8 fL 103.3 (H)   MCH Latest Ref Range: 27.0 - 33.0 pg 33.0   MCHC Latest Ref Range: 33.6 - 35.0 g/dL 31.9 (L)   RDW Latest Ref Range: 35.9 - 50.0 fL 53.8 (H)   Platelet Count Latest Ref Range: 164 - 446 K/uL 265   MPV Latest Ref Range: 9.0 - 12.9 fL 10.2   Neutrophils-Polys Latest Ref Range: 44.00 - 72.00 % 43.00 (L)   Neutrophils (Absolute) Latest Ref Range: 2.00 - 7.15 K/uL 2.43   Lymphocytes Latest Ref Range: 22.00 - 41.00 % 43.10 (H)   Lymphs (Absolute) Latest Ref Range: 1.00 - 4.80 K/uL 2.43   Monocytes Latest Ref Range: 0.00 - 13.40 % 9.40   Monos (Absolute) Latest Ref Range: 0.00 - 0.85 K/uL 0.53   Eosinophils Latest Ref Range: 0.00 - 6.90 % 3.90   Eos (Absolute) Latest Ref Range: 0.00 - 0.51 K/uL 0.22   Basophils Latest Ref Range: 0.00 - 1.80 % 0.40   Baso (Absolute) Latest Ref Range: 0.00 - 0.12 K/uL 0.02   Immature Granulocytes Latest Ref Range: 0.00 - 0.90 % 0.20   Immature Granulocytes (abs) Latest Ref Range: 0.00 - 0.11 K/uL 0.01   Nucleated RBC Latest Units: /100 WBC 0.00    NRBC (Absolute) Latest Units: K/uL 0.00   Sodium Latest Ref Range: 135 - 145 mmol/L 137   Potassium Latest Ref Range: 3.6 - 5.5 mmol/L 4.4   Chloride Latest Ref Range: 96 - 112 mmol/L 105   Co2 Latest Ref Range: 20 - 33 mmol/L 24   Anion Gap Latest Ref Range: 0.0 - 11.9  8.0   Glucose Latest Ref Range: 65 - 99 mg/dL 156 (H)   Bun Latest Ref Range: 8 - 22 mg/dL 22   Creatinine Latest Ref Range: 0.50 - 1.40 mg/dL 0.88   GFR If  Latest Ref Range: >60 mL/min/1.73 m 2 >60   GFR If Non  Latest Ref Range: >60 mL/min/1.73 m 2 >60   Calcium Latest Ref Range: 8.5 - 10.5 mg/dL 9.7   AST(SGOT) Latest Ref Range: 12 - 45 U/L 21   ALT(SGPT) Latest Ref Range: 2 - 50 U/L 33   Alkaline Phosphatase Latest Ref Range: 30 - 99 U/L 107 (H)   Total Bilirubin Latest Ref Range: 0.1 - 1.5 mg/dL 0.4   Albumin Latest Ref Range: 3.2 - 4.9 g/dL 4.2   Total Protein Latest Ref Range: 6.0 - 8.2 g/dL 7.4   Globulin Latest Ref Range: 1.9 - 3.5 g/dL 3.2   A-G Ratio Latest Units: g/dL 1.3   Glycohemoglobin Latest Ref Range: 0.0 - 5.6 % 8.1 (H)   Estim. Avg Glu Latest Units: mg/dL 186   Cholesterol,Tot Latest Ref Range: 100 - 199 mg/dL 254 (H)   Triglycerides Latest Ref Range: 0 - 149 mg/dL 157 (H)   HDL Latest Ref Range: >=40 mg/dL 47   LDL Latest Ref Range: <100 mg/dL 176 (H)       Assessment:     1. Other specified transient cerebral ischemias     2. Essential hypertension     3. Mixed hyperlipidemia  COMP METABOLIC PANEL    LIPID PROFILE       Medical Decision Making:  Today's Assessment / Status / Plan:     TIA: She had slurred speech but it has completely resolved.  She will continue on Plavix.    Hypertension: Blood pressure is good in the office today.  Continue current regimen.    Hyperlipidemia: Her LDL is very elevated at 176.  Since she has cerebral vascular disease per MRI her LDL goal should be 70 or less.  Additionally she was unwilling to take statins until she found out that they can be preventative for  stroke.  She is willing to try Crestor 5 mg daily.  We will check lipids and metabolic panel in 3 months.    She will follow-up in 6 months with Dr Eliceo May.  She will follow-up sooner if problems.    Collaborating Provider: Dr. Eliceo Ndiaye.

## 2018-09-06 ENCOUNTER — PATIENT OUTREACH (OUTPATIENT)
Dept: HEALTH INFORMATION MANAGEMENT | Facility: OTHER | Age: 78
End: 2018-09-06

## 2018-09-06 NOTE — PROGRESS NOTES
TC to pt. Pt denies any ongoing symptoms from recent admit to [facility]. Additionally, pt reports having no contacts from neurology and anticipates not pursuing this medical service. Pt assumes it is not urgent if provider is not actively pursuing her for scheduling.     Pt educated on referral to [facility]. She anticipates independently checking on referral.     Plan:  TC to pt at later time/date  Msg routed to PCP

## 2018-10-03 RX ORDER — ACYCLOVIR 200 MG/1
CAPSULE ORAL
Qty: 90 CAP | Refills: 0 | Status: SHIPPED | OUTPATIENT
Start: 2018-10-03 | End: 2018-12-10 | Stop reason: SDUPTHER

## 2018-10-03 NOTE — TELEPHONE ENCOUNTER
Requested Prescriptions     Signed Prescriptions Disp Refills   • acyclovir (ZOVIRAX) 200 MG Cap 90 Cap 0     Sig: TAKE 1 CAPSULE EVERY DAY     Authorizing Provider: PRATEEK HART A.P.R.N.

## 2018-10-04 ENCOUNTER — PATIENT OUTREACH (OUTPATIENT)
Dept: HEALTH INFORMATION MANAGEMENT | Facility: OTHER | Age: 78
End: 2018-10-04

## 2018-10-04 NOTE — PROGRESS NOTES
TC to pt. VM(1) left requesting return TC.    Plan:  TC to pt at later time/date  Assessment of completion of goals and possible graduation from [facility] services.

## 2018-10-15 ENCOUNTER — PATIENT OUTREACH (OUTPATIENT)
Dept: HEALTH INFORMATION MANAGEMENT | Facility: OTHER | Age: 78
End: 2018-10-15

## 2018-10-15 NOTE — PROGRESS NOTES
TC to pt. Pt reports no concerns regarding her stroke (residual symptoms). Pt declines assistance from this SW in scheduling neurology due to issues of follow through by provider. Pt also expressed agitation with cardiologist providing medications and interfering with her prescription refill routine.     Pt denies following up with [facility]. SW provided contact information and pt anticipates independent follow up.     Plan:  TC to pt at later time/date

## 2018-10-29 ENCOUNTER — OFFICE VISIT (OUTPATIENT)
Dept: MEDICAL GROUP | Facility: PHYSICIAN GROUP | Age: 78
End: 2018-10-29
Payer: MEDICARE

## 2018-10-29 VITALS
WEIGHT: 162 LBS | HEART RATE: 78 BPM | BODY MASS INDEX: 28.7 KG/M2 | HEIGHT: 63 IN | SYSTOLIC BLOOD PRESSURE: 112 MMHG | RESPIRATION RATE: 14 BRPM | TEMPERATURE: 97.7 F | OXYGEN SATURATION: 93 % | DIASTOLIC BLOOD PRESSURE: 70 MMHG

## 2018-10-29 DIAGNOSIS — Z86.73 HX OF TRANSIENT ISCHEMIC ATTACK (TIA): ICD-10-CM

## 2018-10-29 DIAGNOSIS — E11.9 DIABETES MELLITUS TYPE 2, NONINSULIN DEPENDENT (HCC): ICD-10-CM

## 2018-10-29 DIAGNOSIS — E03.9 HYPOTHYROIDISM, UNSPECIFIED TYPE: ICD-10-CM

## 2018-10-29 DIAGNOSIS — E11.9 TYPE 2 DIABETES MELLITUS WITHOUT COMPLICATION, WITHOUT LONG-TERM CURRENT USE OF INSULIN (HCC): ICD-10-CM

## 2018-10-29 DIAGNOSIS — I10 ESSENTIAL HYPERTENSION: ICD-10-CM

## 2018-10-29 PROBLEM — N30.00 ACUTE CYSTITIS WITHOUT HEMATURIA: Status: RESOLVED | Noted: 2018-06-19 | Resolved: 2018-10-29

## 2018-10-29 PROCEDURE — 92250 FUNDUS PHOTOGRAPHY W/I&R: CPT | Mod: TC | Performed by: NURSE PRACTITIONER

## 2018-10-29 PROCEDURE — 99214 OFFICE O/P EST MOD 30 MIN: CPT | Performed by: NURSE PRACTITIONER

## 2018-10-29 RX ORDER — GLIPIZIDE 5 MG/1
5 TABLET, FILM COATED, EXTENDED RELEASE ORAL DAILY
Qty: 90 TAB | Refills: 2 | Status: SHIPPED | OUTPATIENT
Start: 2018-10-29 | End: 2019-02-13 | Stop reason: SDUPTHER

## 2018-10-29 NOTE — ASSESSMENT & PLAN NOTE
Due for foot exam today.  Taking glipizide 1/2 tablet bid.  Would like to have the 2.5 mg bid.  Will do 5 mg SR daily.

## 2018-10-29 NOTE — PROGRESS NOTES
Chief Complaint   Patient presents with   • Follow-Up   • Diabetes       Subjective:   Bailey White is a 78 y.o. female here today for evaluation and management of:    Diabetes mellitus type 2, noninsulin dependent  Due for foot exam today.  Taking glipizide 1/2 tablet bid.  Would like to have the 2.5 mg bid.  Will do 5 mg SR daily.     Essential hypertension  bp today at goal 112/70.     Hypothyroidism  Taking levothyroxine 75 mcg daily. Feeling good today.  Vital signs stable at this time.      Patient is accompanied by daughter, manny    Current medicines (including changes today)  Current Outpatient Prescriptions   Medication Sig Dispense Refill   • glipiZIDE SR (GLUCOTROL) 5 MG TABLET SR 24 HR Take 1 Tab by mouth every day. 90 Tab 2   • acyclovir (ZOVIRAX) 200 MG Cap TAKE 1 CAPSULE EVERY DAY 90 Cap 0   • Cranberry 200 MG Cap Take  by mouth 2 Times a Day.     • clopidogrel (PLAVIX) 75 MG Tab Take 1 Tab by mouth every day. 90 Tab 3   • amLODIPine (NORVASC) 5 MG Tab Take 1 Tab by mouth every day. 90 Tab 3   • rosuvastatin (CRESTOR) 5 MG Tab Take 1 Tab by mouth every evening. 30 Tab 11   • Magnesium Gluconate (MAGNESIUM 27 PO) Take  by mouth.     • Omega-3 Fatty Acids (FISH OIL) 1000 MG Cap capsule Take 1,000 mg by mouth 3 times a day, with meals.     • levothyroxine (SYNTHROID) 75 MCG Tab Take 1 Tab by mouth Every morning on an empty stomach. 90 Tab 2   • omeprazole (PRILOSEC) 20 MG delayed-release capsule TAKE 1 CAPSULE EVERY DAY 90 Cap 2   • Docusate Sodium (COLACE PO) Take  by mouth.     • Blood Glucose Monitoring Suppl Device Meter: Dispense Device of Insurance Preference. Sig. Use as directed for blood sugar monitoring. #1. NR. 1 Device 0   • acetaminophen (TYLENOL) 500 MG TABS Take 500-1,000 mg by mouth every 6 hours as needed.     • Cholecalciferol (VITAMIN D) 2000 UNITS CAPS Take  by mouth every day.       No current facility-administered medications for this visit.      She  has a past medical  "history of Anesthesia; Arthritis; Blood transfusion; Bronchitis (2011); Carotid stenosis; CATARACT; CVA (cerebral vascular accident) ([facility]); Diabetes; Fibromyalgia; Fibromyalgia; GERD (gastroesophageal reflux disease); Heart burn; Herpes genital; Hiatus hernia syndrome; HTN; Indigestion; Insomnia; Migraine; Pain; Seizure ([facility]); Thyroid disease; TIA; Unspecified hemorrhagic conditions; Unspecified urinary incontinence; and Urinary bladder disorder. She also has no past medical history of ASTHMA or Breast cancer ([facility]).    ROS as stated in hpi  No chest pain, no shortness of breath, no abdominal pain       Objective:     Blood pressure 112/70, pulse 78, temperature 36.5 °C (97.7 °F), temperature source Temporal, resp. rate 14, height 1.6 m (5' 3\"), weight 73.5 kg (162 lb), last menstrual period 01/01/1983, SpO2 93 %, not currently breastfeeding. Body mass index is 28.7 kg/m².   Physical Exam:  Constitutional: Alert, no distress.  Skin: Warm, dry, good turgor,no cyanosis, no rashes in visible areas.  Eye: Equal, round and reactive, conjunctiva clear, lids normal.  Ears: No tenderness, no discharge.  External canals are without any significant edema or erythema.    Gross auditory acuity is intact.  Nose: symmetrical without tenderness, no discharge.  Mouth/Throat: lips without lesion.  Oropharynx clear.   Neck: Trachea midline, no masses, no obvious thyroid enlargement... Range of motion within normal limits.  Neuro: Cranial nerves 2-12 grossly intact.  No sensory deficit.  Respiratory: Unlabored respiratory effort, lungs clear to auscultation, no wheezes, no ronchi.  Cardiovascular: Normal S1, S2, no murmur, no edema.  Monofilament testing with a 10 gram force: sensation intact: intact bilaterally  Visual Inspection: Feet without maceration, ulcers, fissures.  Pedal pulses: intact bilaterally  Psych: Alert and oriented x3, normal affect and mood and judgement.        Assessment and Plan:   The following treatment plan " was discussed    1. Type 2 diabetes mellitus without complication, without long-term current use of insulin (HCC)  Chronic, ongoing. Stable.  Retina exam in office today.  Foot exam WNL.  Will have patient do SR Glipizide 5 mg daily rather than 2.5 bid.  Monitor and follow.  Return in 6 months.   - Diabetic Monofilament LE Exam  - POCT Retinal Eye Exam    2. Diabetes mellitus type 2, noninsulin dependent (HCC)  See #2    3. Essential hypertension  Chronic, ongoing. Stable.  BP at goal.  Continue with amlodipine.  Followed by cardiology.     4. Hypothyroidism, unspecified type  Chronic, ongoing. Stable.  Continue with levothyroxine 75 mcg daily.  Return in 6 months.       Followup: Return in about 6 months (around 4/29/2019) for Diabetes.         Educated in proper administration of medication(s) ordered today including safety, possible SE, risks, benefits, rationale and alternatives to therapy.     Please note that this dictation was created using voice recognition software. I have made every reasonable attempt to correct obvious errors, but I expect that there are errors of grammar and possibly content that I did not discover before finalizing the note.

## 2018-10-31 ENCOUNTER — TELEPHONE (OUTPATIENT)
Dept: MEDICAL GROUP | Facility: PHYSICIAN GROUP | Age: 78
End: 2018-10-31

## 2018-10-31 DIAGNOSIS — E11.3292 MILD NONPROLIFERATIVE DIABETIC RETINOPATHY OF LEFT EYE WITHOUT MACULAR EDEMA ASSOCIATED WITH TYPE 2 DIABETES MELLITUS (HCC): ICD-10-CM

## 2018-10-31 PROBLEM — H35.00 RETINOPATHY: Status: ACTIVE | Noted: 2018-10-31

## 2018-10-31 LAB — RETINAL SCREEN: POSITIVE

## 2018-10-31 NOTE — TELEPHONE ENCOUNTER
2. SPECIFIC Action To Be Taken: Referral pending, please sign.    3. Diagnosis/Clinical Reason for Request: [id number], E11.9    4. Specialty & Provider Name/Lab/Imaging Location: Opthamology    5. Is appointment scheduled for requested order/referral: no    Patient was not informed they will receive a return phone call from the office ONLY if there are any questions before processing their request. Advised to call back if they haven't received a call from the referral department in 5 days.

## 2018-11-14 ENCOUNTER — PATIENT OUTREACH (OUTPATIENT)
Dept: HEALTH INFORMATION MANAGEMENT | Facility: OTHER | Age: 78
End: 2018-11-14

## 2018-11-15 NOTE — PROGRESS NOTES
TC to pt. Pt reported inability to follow up with [facility] due to health/illness.    TC to [facility]. VM left requesting return TC.    Plan:  TC to pt at later time/date

## 2018-11-28 ENCOUNTER — PATIENT OUTREACH (OUTPATIENT)
Dept: HEALTH INFORMATION MANAGEMENT | Facility: OTHER | Age: 78
End: 2018-11-28

## 2018-11-28 NOTE — PROGRESS NOTES
TC to pt. Pt reported having a busy schedule and being unable to contact [facility] regarding her place on the wait list. Pt requested senior  contact information. SW provided contact number ([phone]). Pt recommended to call program monthly.    Plan:  TC to pt at later time/date

## 2018-12-10 RX ORDER — ACYCLOVIR 200 MG/1
CAPSULE ORAL
Qty: 90 CAP | Refills: 3 | Status: SHIPPED | OUTPATIENT
Start: 2018-12-10 | End: 2019-10-09 | Stop reason: SDUPTHER

## 2018-12-10 NOTE — TELEPHONE ENCOUNTER
Requested Prescriptions     Signed Prescriptions Disp Refills   • acyclovir (ZOVIRAX) 200 MG Cap 90 Cap 3     Sig: TAKE 1 CAPSULE EVERY DAY     Authorizing Provider: PRATEEK HART A.P.R.N.

## 2018-12-26 ENCOUNTER — PATIENT OUTREACH (OUTPATIENT)
Dept: HEALTH INFORMATION MANAGEMENT | Facility: OTHER | Age: 78
End: 2018-12-26

## 2018-12-26 NOTE — PROGRESS NOTES
TC to pt. Pt stated she has been unable to contact [facility] due to other obligations. Pt declined receiving their contact information at this time as she already has it. Pt was agreeable to graduation from [facility] at this time and confirmed having 's contact information for future self-referral if social needs arise.     Goal: Bailey identifies potential community resources and services available to help meet her basic needs over the next 90 days.   Barriers: System navigation, physical limitations  Interventions:    will educate Bailey on volunteer programs to support her social experiences as she continues to age in the community.    8/8/2018 Pt previously attended [facility] program, currently not interested   8/8/2018 Referral to [facility] (pt not actively following up on referral)   8/8/2018 Referral to [facility], [facility] Outreach Program (over income)   12/26/2018 Bailey prompted to independently follow up with [facility] (pt agreeable)  Start Date: 08/08/18  End Date:  12/26/2018    Plan:  Pt graduated from [facility]

## 2019-02-13 RX ORDER — LEVOTHYROXINE SODIUM 0.07 MG/1
TABLET ORAL
Qty: 90 TAB | Refills: 2 | Status: SHIPPED | OUTPATIENT
Start: 2019-02-13 | End: 2019-10-09 | Stop reason: SDUPTHER

## 2019-02-13 RX ORDER — GLIPIZIDE 5 MG/1
5 TABLET, FILM COATED, EXTENDED RELEASE ORAL DAILY
Qty: 90 TAB | Refills: 1 | Status: SHIPPED | OUTPATIENT
Start: 2019-02-13 | End: 2019-08-01 | Stop reason: SDUPTHER

## 2019-02-13 RX ORDER — OMEPRAZOLE 20 MG/1
CAPSULE, DELAYED RELEASE ORAL
Qty: 90 CAP | Refills: 2 | Status: SHIPPED | OUTPATIENT
Start: 2019-02-13 | End: 2019-10-09 | Stop reason: SDUPTHER

## 2019-02-13 NOTE — TELEPHONE ENCOUNTER
Requested Prescriptions     Signed Prescriptions Disp Refills   • levothyroxine (SYNTHROID) 75 MCG Tab 90 Tab 2     Sig: TAKE 1 TABLET EVERY MORNING ON AN EMPTY STOMACH     Authorizing Provider: PRATEEK HART   • omeprazole (PRILOSEC) 20 MG delayed-release capsule 90 Cap 2     Sig: TAKE 1 CAPSULE EVERY DAY     Authorizing Provider: PRATEEK HART A.P.R.N.

## 2019-02-13 NOTE — TELEPHONE ENCOUNTER
Was the patient seen in the last year in this department? Yes    Does patient have an active prescription for medications requested? No     Received Request Via: PharmacyChanging to mail order

## 2019-02-14 NOTE — TELEPHONE ENCOUNTER
Requested Prescriptions     Signed Prescriptions Disp Refills   • glipiZIDE SR (GLUCOTROL) 5 MG TABLET SR 24 HR 90 Tab 1     Sig: Take 1 Tab by mouth every day.     Authorizing Provider: PRATEEK HART A.P.R.N.

## 2019-05-20 ENCOUNTER — OFFICE VISIT (OUTPATIENT)
Dept: URGENT CARE | Facility: PHYSICIAN GROUP | Age: 79
End: 2019-05-20
Payer: MEDICARE

## 2019-05-20 ENCOUNTER — HOSPITAL ENCOUNTER (OUTPATIENT)
Facility: MEDICAL CENTER | Age: 79
End: 2019-05-20
Attending: NURSE PRACTITIONER
Payer: MEDICARE

## 2019-05-20 VITALS
RESPIRATION RATE: 18 BRPM | WEIGHT: 162 LBS | OXYGEN SATURATION: 93 % | HEART RATE: 72 BPM | HEIGHT: 63 IN | TEMPERATURE: 96.6 F | BODY MASS INDEX: 28.7 KG/M2 | DIASTOLIC BLOOD PRESSURE: 82 MMHG | SYSTOLIC BLOOD PRESSURE: 138 MMHG

## 2019-05-20 DIAGNOSIS — N30.01 ACUTE CYSTITIS WITH HEMATURIA: ICD-10-CM

## 2019-05-20 DIAGNOSIS — R30.0 DYSURIA: ICD-10-CM

## 2019-05-20 DIAGNOSIS — Z86.39 H/O TYPE 2 DIABETES MELLITUS: ICD-10-CM

## 2019-05-20 DIAGNOSIS — R42 DIZZINESS: ICD-10-CM

## 2019-05-20 LAB
APPEARANCE UR: NORMAL
BILIRUB UR STRIP-MCNC: NEGATIVE MG/DL
COLOR UR AUTO: NORMAL
GLUCOSE BLD-MCNC: 159 MG/DL (ref 70–100)
GLUCOSE UR STRIP.AUTO-MCNC: 250 MG/DL
KETONES UR STRIP.AUTO-MCNC: NEGATIVE MG/DL
LEUKOCYTE ESTERASE UR QL STRIP.AUTO: NORMAL
NITRITE UR QL STRIP.AUTO: POSITIVE
PH UR STRIP.AUTO: 5.5 [PH] (ref 5–8)
PROT UR QL STRIP: NEGATIVE MG/DL
RBC UR QL AUTO: NORMAL
SP GR UR STRIP.AUTO: 1.02
UROBILINOGEN UR STRIP-MCNC: 0.2 MG/DL

## 2019-05-20 PROCEDURE — 82962 GLUCOSE BLOOD TEST: CPT | Performed by: NURSE PRACTITIONER

## 2019-05-20 PROCEDURE — 99214 OFFICE O/P EST MOD 30 MIN: CPT | Performed by: NURSE PRACTITIONER

## 2019-05-20 PROCEDURE — 87186 SC STD MICRODIL/AGAR DIL: CPT

## 2019-05-20 PROCEDURE — 87077 CULTURE AEROBIC IDENTIFY: CPT

## 2019-05-20 PROCEDURE — 81002 URINALYSIS NONAUTO W/O SCOPE: CPT | Performed by: NURSE PRACTITIONER

## 2019-05-20 PROCEDURE — 87086 URINE CULTURE/COLONY COUNT: CPT

## 2019-05-20 RX ORDER — NITROFURANTOIN 25; 75 MG/1; MG/1
100 CAPSULE ORAL EVERY 12 HOURS
Qty: 14 CAP | Refills: 0 | Status: SHIPPED | OUTPATIENT
Start: 2019-05-20 | End: 2019-05-27

## 2019-05-20 NOTE — PROGRESS NOTES
"Subjective:      Bailey White is a 79 y.o. female who presents with Dysuria (urinary [id number] )            HPI  Dysuria x 1 week. States has increased her fresh cranberry intake since she has had symptoms. Has been out of town x 1 week. Drinking \"hot water\". Visiting in [place]. H/O Type 2 diabetes. Denies fever, n/v, abdominal/back pain, vaginal itching, diarrhea. No h/o urinary incontinence. H/o recurrent UTIs.    PMH:  has a past medical history of Anesthesia; Arthritis; Blood transfusion; Bronchitis (2011); Carotid stenosis; CATARACT; CVA (cerebral vascular accident) ([facility]); Diabetes; Fibromyalgia; Fibromyalgia; GERD (gastroesophageal reflux disease); Heart burn; Herpes genital; Hiatus hernia syndrome; HTN; transient ischemic attack (TIA); Indigestion; Insomnia; Migraine; Pain; Seizure ([facility]); Thyroid disease; TIA; Unspecified hemorrhagic conditions; Unspecified urinary incontinence; and Urinary bladder disorder. She also has no past medical history of ASTHMA or Breast cancer ([facility]).  MEDS:   Current Outpatient Prescriptions:   •  levothyroxine (SYNTHROID) 75 MCG Tab, TAKE 1 TABLET EVERY MORNING ON AN EMPTY STOMACH, Disp: 90 Tab, Rfl: 2  •  omeprazole (PRILOSEC) 20 MG delayed-release capsule, TAKE 1 CAPSULE EVERY DAY, Disp: 90 Cap, Rfl: 2  •  glipiZIDE SR (GLUCOTROL) 5 MG TABLET SR 24 HR, Take 1 Tab by mouth every day., Disp: 90 Tab, Rfl: 1  •  Cranberry 200 MG Cap, Take  by mouth 2 Times a Day., Disp: , Rfl:   •  clopidogrel (PLAVIX) 75 MG Tab, Take 1 Tab by mouth every day., Disp: 90 Tab, Rfl: 3  •  amLODIPine (NORVASC) 5 MG Tab, Take 1 Tab by mouth every day., Disp: 90 Tab, Rfl: 3  •  rosuvastatin (CRESTOR) 5 MG Tab, Take 1 Tab by mouth every evening., Disp: 30 Tab, Rfl: 11  •  Omega-3 Fatty Acids (FISH OIL) 1000 MG Cap capsule, Take 1,000 mg by mouth 3 times a day, with meals., Disp: , Rfl:   •  Blood Glucose Monitoring Suppl Device, Meter: Dispense Device of Insurance Preference. Sig. Use as " directed for blood sugar monitoring. #1. NR., Disp: 1 Device, Rfl: 0  •  Cholecalciferol (VITAMIN D) 2000 UNITS CAPS, Take  by mouth every day., Disp: , Rfl:   •  acyclovir (ZOVIRAX) 200 MG Cap, TAKE 1 CAPSULE EVERY DAY, Disp: 90 Cap, Rfl: 3  •  Magnesium Gluconate (MAGNESIUM 27 PO), Take  by mouth., Disp: , Rfl:   •  Docusate Sodium (COLACE PO), Take  by mouth., Disp: , Rfl:   •  acetaminophen (TYLENOL) 500 MG TABS, Take 500-1,000 mg by mouth every 6 hours as needed., Disp: , Rfl:   ALLERGIES:   Allergies   Allergen Reactions   • Asa [Aspirin]      GI bleeding   • Ciprofloxacin Rash   • Clindamycin Hives   • Codeine Vomiting   • Food Anxiety     Yellow dye, peanuts and peanut products, all peppers,tomato, potato,wheat, gluten   • Hydrocortisone      Suicidal after Cortisone injection   • Ibuprofen      Abdominal pain   • Imitrex [Sumatriptan Succinate] Vomiting   • Inderal [Propranolol Hcl]      Not sure of reaction   • Lyrica      suicidal   • Mellaril      Psychotic episode   • Other Drug      All mycins,cillins,and cyclines   • Pcn [Penicillins] Hives   • Ropinirole Hcl [Requip] Shortness of Breath   • Shellfish Allergy      headaches   • Sulfa Drugs Itching   • Tagamet [Cimetidine]      Mental confusion     SURGHX:   Past Surgical History:   Procedure Laterality Date   • HYSTEROSCOPY WITH VIDEO DIAGNOSTIC  9/5/2013    Performed by Portillo Barragan M.D. at SURGERY SAME DAY [facility] ORS   • DILATION AND CURETTAGE  9/5/2013    Performed by Portillo Barragan M.D. at SURGERY SAME DAY [facility] ORS   • OTHER      cataracts   • OTHER ORTHOPEDIC SURGERY     • TONSILLECTOMY       SOCHX:  reports that she quit smoking about 64 years ago. Her smoking use included Cigarettes. She has a 3.00 pack-year smoking history. She has never used smokeless tobacco. She reports that she does not drink alcohol or use drugs.  FH: Family history was reviewed, no pertinent findings to report    Review of Systems   Constitutional: Negative  "for chills, fever and malaise/fatigue.   Gastrointestinal: Negative for abdominal pain, constipation, diarrhea, nausea and vomiting.   Genitourinary: Positive for dysuria, frequency and urgency. Negative for flank pain and hematuria.   Musculoskeletal: Negative for back pain and myalgias.   Skin: Negative for itching and rash.   Neurological: Negative for dizziness, weakness and headaches.   All other systems reviewed and are negative.         Objective:     /82   Pulse 72   Temp 35.9 °C (96.6 °F) (Temporal)   Resp 18   Ht 1.6 m (5' 3\")   Wt 73.5 kg (162 lb)   LMP 01/01/1983   SpO2 93%   BMI 28.70 kg/m²      Physical Exam   Constitutional: She is oriented to person, place, and time. She appears well-developed and well-nourished. She is active and cooperative.  Non-toxic appearance. She does not have a sickly appearance. She does not appear ill. No distress.   HENT:   Head: Normocephalic.   Eyes: Pupils are equal, round, and reactive to light. Conjunctivae and EOM are normal.   Neck: Normal range of motion. Neck supple.   Cardiovascular: Normal rate.    Pulmonary/Chest: Effort normal.   Abdominal: Soft. Bowel sounds are normal. She exhibits no distension. There is no hepatosplenomegaly. There is no tenderness. There is no rigidity, no rebound, no guarding, no CVA tenderness, no tenderness at McBurney's point and negative Meza's sign. No hernia.   Musculoskeletal: Normal range of motion.   Neurological: She is alert and oriented to person, place, and time. She has normal strength. No cranial nerve deficit or sensory deficit. Coordination and gait normal. GCS eye subscore is 4. GCS verbal subscore is 5. GCS motor subscore is 6.   Walks with walker assistance.   Skin: Skin is warm and dry. She is not diaphoretic.   Psychiatric: She has a normal mood and affect. Her speech is normal and behavior is normal. Judgment and thought content normal. She is not actively hallucinating. Cognition and memory are " normal.   Very pleasant demeanor and joyful personality. She is attentive.   Vitals reviewed.              Assessment/Plan:     1. Dysuria    - POCT Urinalysis    2. Acute cystitis with hematuria    - URINE CULTURE(NEW); Future  - nitrofurantoin monohyd macro (MACROBID) 100 MG Cap; Take 1 Cap by mouth every 12 hours for 7 days.  Dispense: 14 Cap; Refill: 0    3. Dizziness    - POCT Glucose: 159    4. H/O type 2 diabetes mellitus    - POCT Glucose    Increase water intake  Urinate more frequently and empty bladder completely  Practice good toileting hygiene after bowel movements and sexual intercourse, refrain from sexual intercourse until infection cleared  Monitor for back/flank pain, difficulty urinating, blood in urine- need re-evaluation    MyChart release

## 2019-05-21 DIAGNOSIS — N30.01 ACUTE CYSTITIS WITH HEMATURIA: ICD-10-CM

## 2019-05-21 ASSESSMENT — ENCOUNTER SYMPTOMS
NAUSEA: 0
ABDOMINAL PAIN: 0
FEVER: 0
MYALGIAS: 0
DIZZINESS: 0
CHILLS: 0
FLANK PAIN: 0
WEAKNESS: 0
VOMITING: 0
HEADACHES: 0
CONSTIPATION: 0
DIARRHEA: 0
BACK PAIN: 0

## 2019-05-23 LAB
BACTERIA UR CULT: ABNORMAL
BACTERIA UR CULT: ABNORMAL
SIGNIFICANT IND 70042: ABNORMAL
SITE SITE: ABNORMAL
SOURCE SOURCE: ABNORMAL

## 2019-06-03 ENCOUNTER — OFFICE VISIT (OUTPATIENT)
Dept: MEDICAL GROUP | Facility: PHYSICIAN GROUP | Age: 79
End: 2019-06-03
Payer: MEDICARE

## 2019-06-03 ENCOUNTER — HOSPITAL ENCOUNTER (OUTPATIENT)
Dept: LAB | Facility: MEDICAL CENTER | Age: 79
End: 2019-06-03
Attending: NURSE PRACTITIONER
Payer: MEDICARE

## 2019-06-03 VITALS
HEIGHT: 63 IN | HEART RATE: 80 BPM | BODY MASS INDEX: 30.65 KG/M2 | SYSTOLIC BLOOD PRESSURE: 122 MMHG | WEIGHT: 173 LBS | DIASTOLIC BLOOD PRESSURE: 68 MMHG | RESPIRATION RATE: 14 BRPM | OXYGEN SATURATION: 94 % | TEMPERATURE: 98.2 F

## 2019-06-03 DIAGNOSIS — L98.9 SKIN LESION OF FACE: ICD-10-CM

## 2019-06-03 DIAGNOSIS — E78.2 MIXED HYPERLIPIDEMIA: ICD-10-CM

## 2019-06-03 DIAGNOSIS — E78.5 DYSLIPIDEMIA: ICD-10-CM

## 2019-06-03 DIAGNOSIS — E11.9 DIABETES MELLITUS TYPE 2, NONINSULIN DEPENDENT (HCC): ICD-10-CM

## 2019-06-03 DIAGNOSIS — E11.9 TYPE 2 DIABETES MELLITUS WITHOUT COMPLICATION, WITHOUT LONG-TERM CURRENT USE OF INSULIN (HCC): ICD-10-CM

## 2019-06-03 DIAGNOSIS — N39.490 OVERFLOW INCONTINENCE OF URINE: ICD-10-CM

## 2019-06-03 DIAGNOSIS — H93.11 RINGING IN EARS, RIGHT: ICD-10-CM

## 2019-06-03 DIAGNOSIS — N39.0 CHRONIC UTI: ICD-10-CM

## 2019-06-03 LAB
ALBUMIN SERPL BCP-MCNC: 4.3 G/DL (ref 3.2–4.9)
ALBUMIN/GLOB SERPL: 1.6 G/DL
ALP SERPL-CCNC: 100 U/L (ref 30–99)
ALT SERPL-CCNC: 21 U/L (ref 2–50)
ANION GAP SERPL CALC-SCNC: 9 MMOL/L (ref 0–11.9)
AST SERPL-CCNC: 19 U/L (ref 12–45)
BILIRUB SERPL-MCNC: 0.5 MG/DL (ref 0.1–1.5)
BUN SERPL-MCNC: 21 MG/DL (ref 8–22)
CALCIUM SERPL-MCNC: 9.2 MG/DL (ref 8.5–10.5)
CHLORIDE SERPL-SCNC: 107 MMOL/L (ref 96–112)
CHOLEST SERPL-MCNC: 142 MG/DL (ref 100–199)
CO2 SERPL-SCNC: 24 MMOL/L (ref 20–33)
CREAT SERPL-MCNC: 0.82 MG/DL (ref 0.5–1.4)
GLOBULIN SER CALC-MCNC: 2.7 G/DL (ref 1.9–3.5)
GLUCOSE SERPL-MCNC: 163 MG/DL (ref 65–99)
HBA1C MFR BLD: 8.2 % (ref 0–5.6)
HDLC SERPL-MCNC: 48 MG/DL
INT CON NEG: NEGATIVE
INT CON POS: POSITIVE
LDLC SERPL CALC-MCNC: 73 MG/DL
POTASSIUM SERPL-SCNC: 4.1 MMOL/L (ref 3.6–5.5)
PROT SERPL-MCNC: 7 G/DL (ref 6–8.2)
SODIUM SERPL-SCNC: 140 MMOL/L (ref 135–145)
TRIGL SERPL-MCNC: 105 MG/DL (ref 0–149)

## 2019-06-03 PROCEDURE — 80061 LIPID PANEL: CPT

## 2019-06-03 PROCEDURE — 80053 COMPREHEN METABOLIC PANEL: CPT

## 2019-06-03 PROCEDURE — 36415 COLL VENOUS BLD VENIPUNCTURE: CPT

## 2019-06-03 PROCEDURE — 83036 HEMOGLOBIN GLYCOSYLATED A1C: CPT | Performed by: NURSE PRACTITIONER

## 2019-06-03 PROCEDURE — 99214 OFFICE O/P EST MOD 30 MIN: CPT | Mod: 25 | Performed by: NURSE PRACTITIONER

## 2019-06-03 PROCEDURE — 17110 DESTRUCTION B9 LES UP TO 14: CPT | Performed by: NURSE PRACTITIONER

## 2019-06-03 RX ORDER — ATORVASTATIN CALCIUM 20 MG/1
20 TABLET, FILM COATED ORAL DAILY
Qty: 30 TAB | Refills: 3 | Status: SHIPPED | OUTPATIENT
Start: 2019-06-03 | End: 2019-08-01 | Stop reason: SDUPTHER

## 2019-06-03 NOTE — ASSESSMENT & PLAN NOTE
A1c 8.2 today.  Up from 7.0.  Taking glipizide SR 5 mg daily.  Weight is up 9 pounds.  Patient denies that she is overeating.  Needs new meter. She will call us with her meter type so we can reorder a new one.  Irregular/inconsistent readings per patient report.

## 2019-06-04 PROBLEM — H93.11: Status: ACTIVE | Noted: 2019-06-04

## 2019-06-04 NOTE — ASSESSMENT & PLAN NOTE
Reports raised, itchy flesh colored lesion on right upper cheek.  Would like it frozen off as it is bothering her.  No exudate, color change or growth reported.

## 2019-06-04 NOTE — PROGRESS NOTES
Chief Complaint   Patient presents with   • Follow-Up   • Diabetes       Subjective:   Bailey White is a 79 y.o. female here today for evaluation and management of:    Diabetes mellitus type 2, noninsulin dependent  A1c 8.2 today.  Up from 7.0.  Taking glipizide SR 5 mg daily.  Weight is up 9 pounds.  Patient denies that she is overeating.  Needs new meter. She will call us with her meter type so we can reorder a new one.  Irregular/inconsistent readings per patient report.     Dyslipidemia  Patient does not want to take crestor anymore due to a recent ad on tv.  Willing to try atorvastatin.    Skin lesion of face  Reports raised, itchy flesh colored lesion on right upper cheek.  Would like it frozen off as it is bothering her.  No exudate, color change or growth reported.     Overflow incontinence of urine  Here for signing of new incontinence products/orders with DME.             Current medicines (including changes today)  Current Outpatient Prescriptions   Medication Sig Dispense Refill   • atorvastatin (LIPITOR) 20 MG Tab Take 1 Tab by mouth every day. 30 Tab 3   • levothyroxine (SYNTHROID) 75 MCG Tab TAKE 1 TABLET EVERY MORNING ON AN EMPTY STOMACH 90 Tab 2   • omeprazole (PRILOSEC) 20 MG delayed-release capsule TAKE 1 CAPSULE EVERY DAY 90 Cap 2   • glipiZIDE SR (GLUCOTROL) 5 MG TABLET SR 24 HR Take 1 Tab by mouth every day. 90 Tab 1   • acyclovir (ZOVIRAX) 200 MG Cap TAKE 1 CAPSULE EVERY DAY 90 Cap 3   • Cranberry 200 MG Cap Take  by mouth 2 Times a Day.     • clopidogrel (PLAVIX) 75 MG Tab Take 1 Tab by mouth every day. 90 Tab 3   • amLODIPine (NORVASC) 5 MG Tab Take 1 Tab by mouth every day. 90 Tab 3   • Omega-3 Fatty Acids (FISH OIL) 1000 MG Cap capsule Take 1,000 mg by mouth 3 times a day, with meals.     • Docusate Sodium (COLACE PO) Take  by mouth.     • acetaminophen (TYLENOL) 500 MG TABS Take 500-1,000 mg by mouth every 6 hours as needed.     • Cholecalciferol (VITAMIN D) 2000 UNITS CAPS  "Take  by mouth every day.     • Magnesium Gluconate (MAGNESIUM 27 PO) Take  by mouth.     • Blood Glucose Monitoring Suppl Device Meter: Dispense Device of Insurance Preference. Sig. Use as directed for blood sugar monitoring. #1. NR. 1 Device 0     No current facility-administered medications for this visit.      She  has a past medical history of Anesthesia; Arthritis; Blood transfusion; Bronchitis (2011); Carotid stenosis; CATARACT; CVA (cerebral vascular accident) ([facility]); Diabetes; Fibromyalgia; Fibromyalgia; GERD (gastroesophageal reflux disease); Heart burn; Herpes genital; Hiatus hernia syndrome; HTN; transient ischemic attack (TIA); Indigestion; Insomnia; Migraine; Pain; Seizure ([facility]); Thyroid disease; TIA; Unspecified hemorrhagic conditions; Unspecified urinary incontinence; and Urinary bladder disorder. She also has no past medical history of ASTHMA or Breast cancer ([facility]).    ROS as stated in hpi  No chest pain, no shortness of breath, no abdominal pain       Objective:     /68 (BP Location: Left arm, Patient Position: Sitting)   Pulse 80   Temp 36.8 °C (98.2 °F) (Temporal)   Resp 14   Ht 1.6 m (5' 3\")   Wt 78.5 kg (173 lb)   SpO2 94%  Body mass index is 30.65 kg/m².   Physical Exam:  Constitutional: Alert, no distress.  Skin: Warm, dry, good turgor,no cyanosis, no rashes in visible areas. Small, raised flesh colored lesion 3 mm on right upper cheek noted.  No exudate, erythema noted.   Eye: Equal, round and reactive, conjunctiva clear, lids normal.  Ears: No tenderness, no discharge.  External canals are without any significant edema or erythema.    Gross auditory acuity is intact.  Nose: symmetrical without tenderness, no discharge.  Mouth/Throat: lips without lesion.  Oropharynx clear.    Neck: Trachea midline, no masses, no obvious thyroid enlargement..  Range of motion within normal limits.  Neuro: Cranial nerves 2-12 grossly intact.  No sensory deficit.  Respiratory: Unlabored respiratory " effort, lungs clear to auscultation, no wheezes, no ronchi.  Cardiovascular: Normal S1, S2, no murmur, no edema.  Psych: Alert and oriented x3, normal affect and mood and judgement.        Assessment and Plan:   The following treatment plan was discussed    1. Type 2 diabetes mellitus without complication, without long-term current use of insulin (HCC)  Chronic, ongoing, worsening.  Discussed importance of daily finger checks.  Patient will call with the name of the meter/strips she needs.  Discussed diet and portions, weight loss.  Recheck in 6 months.  Orders provided.  Monitor and follow.   - POCT Hemoglobin A1C  - CBC WITH DIFFERENTIAL; Future  - Comp Metabolic Panel; Future  - HEMOGLOBIN A1C; Future  - MICROALBUMIN CREAT RATIO URINE; Future    2. Diabetes mellitus type 2, noninsulin dependent (HCC)  See #1    3. Dyslipidemia  Chronic, ongoing.  Patient refuses to take crestor due to a letter she got in the mail.  Will accept atorvastatin.  RX sent to pharmacy.  Discussed the improvement in her cholesterol/lipid panel with the statin.  Will recheck in 6 months.  Monitor and follow.   - Lipid Profile; Future    4. Chronic UTI  Chronic issue,  Recent UTI, seen in urgent care.  Symptom free at this time.  Would like to recheck urinalysis in 4 weeks.  Order provided.  Monitor and follow.   - URINALYSIS,CULTURE IF INDICATED; Future    5. Skin lesion of face  This is anew problem to me.  Chronic lesion.  CRYOTHERAPY:  Discussed risks and benefits of cryotherapy. Patient verbally agreed. 3 applications of cryotherapy were applied to one lesion on right cheek. . Patient tolerated procedure well. Aftercare instructions given.      6. Overflow incontinence of urine  Chronic, ongoing.  DME orders signed today.  Scanned into record.  Monitor and follow.       Followup: Return in about 6 months (around 12/3/2019).         Educated in proper administration of medication(s) ordered today including safety, possible SE, risks,  benefits, rationale and alternatives to therapy.     Please note that this dictation was created using voice recognition software. I have made every reasonable attempt to correct obvious errors, but I expect that there are errors of grammar and possibly content that I did not discover before finalizing the note.

## 2019-06-19 ENCOUNTER — TELEPHONE (OUTPATIENT)
Dept: CARDIOLOGY | Facility: MEDICAL CENTER | Age: 79
End: 2019-06-19

## 2019-06-19 NOTE — TELEPHONE ENCOUNTER
Associated Results   COMP METABOLIC PANEL   Order: [id number]   Status:  Final result   Visible to patient:  Yes (MyChart)  Dx:  Mixed hyperlipidemia   Notes recorded by CANDY Paula on 6/3/2019 at 5:38 PM PDT    Please contact patient and let her know that her LDL has come down significantly.  Continue on current dosing.     Letter printed with APN recommendations and mailed to patient's mailing address.

## 2019-08-01 DIAGNOSIS — E11.9 DIABETES MELLITUS TYPE 2, NONINSULIN DEPENDENT (HCC): ICD-10-CM

## 2019-08-01 DIAGNOSIS — E78.5 DYSLIPIDEMIA: ICD-10-CM

## 2019-08-01 DIAGNOSIS — R93.89 THICKENED ENDOMETRIUM: ICD-10-CM

## 2019-08-01 DIAGNOSIS — I10 ESSENTIAL HYPERTENSION: ICD-10-CM

## 2019-08-01 RX ORDER — CLOPIDOGREL BISULFATE 75 MG/1
75 TABLET ORAL
Qty: 90 TAB | Refills: 3 | Status: SHIPPED | OUTPATIENT
Start: 2019-08-01 | End: 2020-05-26 | Stop reason: SDUPTHER

## 2019-08-01 RX ORDER — ATORVASTATIN CALCIUM 20 MG/1
20 TABLET, FILM COATED ORAL DAILY
Qty: 90 TAB | Refills: 3 | Status: SHIPPED | OUTPATIENT
Start: 2019-08-01 | End: 2020-05-26 | Stop reason: SDUPTHER

## 2019-08-01 RX ORDER — GLIPIZIDE 5 MG/1
5 TABLET, FILM COATED, EXTENDED RELEASE ORAL DAILY
Qty: 90 TAB | Refills: 3 | Status: SHIPPED | OUTPATIENT
Start: 2019-08-01 | End: 2020-05-26 | Stop reason: SDUPTHER

## 2019-08-01 RX ORDER — AMLODIPINE BESYLATE 5 MG/1
5 TABLET ORAL
Qty: 90 TAB | Refills: 3 | Status: SHIPPED | OUTPATIENT
Start: 2019-08-01 | End: 2020-03-11 | Stop reason: SDUPTHER

## 2019-08-01 NOTE — TELEPHONE ENCOUNTER
Was the patient seen in the last year in this department? Yes LOV 06/03/19    Does patient have an active prescription for medications requested? No     Received Request Via: Pharmacy

## 2019-08-01 NOTE — TELEPHONE ENCOUNTER
Requested Prescriptions     Signed Prescriptions Disp Refills   • atorvastatin (LIPITOR) 20 MG Tab 90 Tab 3     Sig: Take 1 Tab by mouth every day.     Authorizing Provider: PRATEEK HART   • clopidogrel (PLAVIX) 75 MG Tab 90 Tab 3     Sig: Take 1 Tab by mouth every day.     Authorizing Provider: PRATEEK HART   • glipiZIDE SR (GLUCOTROL) 5 MG TABLET SR 24 HR 90 Tab 3     Sig: Take 1 Tab by mouth every day.     Authorizing Provider: PRATEEK HART   • amLODIPine (NORVASC) 5 MG Tab 90 Tab 3     Sig: Take 1 Tab by mouth every day.     Authorizing Provider: PRATEEK HART A.P.R.N.

## 2019-08-29 ENCOUNTER — TELEPHONE (OUTPATIENT)
Dept: MEDICAL GROUP | Facility: PHYSICIAN GROUP | Age: 79
End: 2019-08-29

## 2019-08-29 ENCOUNTER — HOSPITAL ENCOUNTER (OUTPATIENT)
Facility: MEDICAL CENTER | Age: 79
End: 2019-08-29
Attending: PHYSICIAN ASSISTANT
Payer: MEDICARE

## 2019-08-29 ENCOUNTER — OFFICE VISIT (OUTPATIENT)
Dept: URGENT CARE | Facility: PHYSICIAN GROUP | Age: 79
End: 2019-08-29
Payer: MEDICARE

## 2019-08-29 VITALS
OXYGEN SATURATION: 94 % | BODY MASS INDEX: 30.65 KG/M2 | HEART RATE: 86 BPM | TEMPERATURE: 97.9 F | DIASTOLIC BLOOD PRESSURE: 78 MMHG | SYSTOLIC BLOOD PRESSURE: 130 MMHG | HEIGHT: 63 IN | WEIGHT: 173 LBS

## 2019-08-29 DIAGNOSIS — N30.01 ACUTE CYSTITIS WITH HEMATURIA: ICD-10-CM

## 2019-08-29 DIAGNOSIS — R30.0 DYSURIA: ICD-10-CM

## 2019-08-29 LAB
APPEARANCE UR: NORMAL
BILIRUB UR STRIP-MCNC: NORMAL MG/DL
COLOR UR AUTO: NORMAL
GLUCOSE UR STRIP.AUTO-MCNC: NORMAL MG/DL
KETONES UR STRIP.AUTO-MCNC: NORMAL MG/DL
LEUKOCYTE ESTERASE UR QL STRIP.AUTO: NORMAL
NITRITE UR QL STRIP.AUTO: NORMAL
PH UR STRIP.AUTO: 5.5 [PH] (ref 5–8)
PROT UR QL STRIP: 100 MG/DL
RBC UR QL AUTO: NORMAL
SP GR UR STRIP.AUTO: 1.02
UROBILINOGEN UR STRIP-MCNC: NORMAL MG/DL

## 2019-08-29 PROCEDURE — 99214 OFFICE O/P EST MOD 30 MIN: CPT | Performed by: PHYSICIAN ASSISTANT

## 2019-08-29 PROCEDURE — 87186 SC STD MICRODIL/AGAR DIL: CPT

## 2019-08-29 PROCEDURE — 81002 URINALYSIS NONAUTO W/O SCOPE: CPT | Performed by: PHYSICIAN ASSISTANT

## 2019-08-29 PROCEDURE — 87086 URINE CULTURE/COLONY COUNT: CPT

## 2019-08-29 PROCEDURE — 87077 CULTURE AEROBIC IDENTIFY: CPT

## 2019-08-29 RX ORDER — NITROFURANTOIN 25; 75 MG/1; MG/1
100 CAPSULE ORAL EVERY 12 HOURS
Qty: 10 CAP | Refills: 0 | Status: SHIPPED | OUTPATIENT
Start: 2019-08-29 | End: 2019-09-05

## 2019-08-29 ASSESSMENT — ENCOUNTER SYMPTOMS
NAUSEA: 0
CHILLS: 0
SWEATS: 0
FLANK PAIN: 0
VOMITING: 0

## 2019-08-29 NOTE — TELEPHONE ENCOUNTER
1. Caller Name: Bailey White                                         Call Back Number: [phone] (home) [phone] (work)      Patient approves a detailed voicemail message: N\A    2. What are the patient's symptoms (location & severity)? Cloudy Urine.  Has been seen at [facility] last night 08/28/19 for severe urinary pain with blood was released home. No nausea, No dizzy.  Have requested for Medical Records    3. Is this a new symptom Yes    4. When did it start? 08/28/19    5. Action taken per Active Symptom Guide: Urgent Care recommended schedule chuy.    6. Patient agrees to recommended action per Active Symptom Escalation Protocol.

## 2019-08-30 DIAGNOSIS — N30.01 ACUTE CYSTITIS WITH HEMATURIA: ICD-10-CM

## 2019-08-30 NOTE — PROGRESS NOTES
"Subjective:   Bailey White is a 79 y.o. female who presents for Blood in Urine (x1day)        Pt was seen in ED last night for \"food poisoning.\"  Symptomatically treated and all sx resolved.  However she was told that she had blood in her urine and did also notice some burning with urination yesterday.  She states that her UTIs tend to progress quickly and would like to have this evaluated.    Dysuria    This is a new problem. The current episode started yesterday. The problem has been waxing and waning. The quality of the pain is described as burning. The pain is moderate. There has been no fever. She is not sexually active. There is no history of pyelonephritis. Associated symptoms include hematuria. Pertinent negatives include no chills, discharge, flank pain, frequency, hesitancy, nausea, possible pregnancy, sweats, urgency or vomiting. She has tried increased fluids for the symptoms. The treatment provided no relief. Her past medical history is significant for recurrent UTIs.     Review of Systems   Constitutional: Negative for chills.   Gastrointestinal: Negative for nausea and vomiting.   Genitourinary: Positive for dysuria and hematuria. Negative for flank pain, frequency, hesitancy and urgency.       PMH:  has a past medical history of Anesthesia, Arthritis, Blood transfusion, Bronchitis (2011), Carotid stenosis, CATARACT, CVA (cerebral vascular accident) ([facility]), Diabetes, Fibromyalgia, Fibromyalgia, GERD (gastroesophageal reflux disease), Heart burn, Herpes genital, Hiatus hernia syndrome, HTN, transient ischemic attack (TIA), Indigestion, Insomnia, Migraine, Pain, Seizure ([facility]), Thyroid disease, TIA, Unspecified hemorrhagic conditions, Unspecified urinary incontinence, and Urinary bladder disorder. She also has no past medical history of ASTHMA or Breast cancer ([facility]).  MEDS:   Current Outpatient Medications:   •  nitrofurantoin monohyd macro (MACROBID) 100 MG Cap, Take 1 Cap by mouth every 12 " hours for 7 days., Disp: 10 Cap, Rfl: 0  •  atorvastatin (LIPITOR) 20 MG Tab, Take 1 Tab by mouth every day., Disp: 90 Tab, Rfl: 3  •  clopidogrel (PLAVIX) 75 MG Tab, Take 1 Tab by mouth every day., Disp: 90 Tab, Rfl: 3  •  glipiZIDE SR (GLUCOTROL) 5 MG TABLET SR 24 HR, Take 1 Tab by mouth every day., Disp: 90 Tab, Rfl: 3  •  amLODIPine (NORVASC) 5 MG Tab, Take 1 Tab by mouth every day., Disp: 90 Tab, Rfl: 3  •  levothyroxine (SYNTHROID) 75 MCG Tab, TAKE 1 TABLET EVERY MORNING ON AN EMPTY STOMACH, Disp: 90 Tab, Rfl: 2  •  omeprazole (PRILOSEC) 20 MG delayed-release capsule, TAKE 1 CAPSULE EVERY DAY, Disp: 90 Cap, Rfl: 2  •  Cranberry 200 MG Cap, Take  by mouth 2 Times a Day., Disp: , Rfl:   •  Blood Glucose Monitoring Suppl Device, Meter: Dispense RELI-ON meter. Sig. Use as directed for blood sugar monitoring., Disp: 1 Device, Rfl: 0  •  acyclovir (ZOVIRAX) 200 MG Cap, TAKE 1 CAPSULE EVERY DAY, Disp: 90 Cap, Rfl: 3  •  Magnesium Gluconate (MAGNESIUM 27 PO), Take  by mouth., Disp: , Rfl:   •  Omega-3 Fatty Acids (FISH OIL) 1000 MG Cap capsule, Take 1,000 mg by mouth 3 times a day, with meals., Disp: , Rfl:   •  Docusate Sodium (COLACE PO), Take  by mouth., Disp: , Rfl:   •  Blood Glucose Monitoring Suppl Device, Meter: Dispense Device of Insurance Preference. Sig. Use as directed for blood sugar monitoring. #1. NR., Disp: 1 Device, Rfl: 0  •  acetaminophen (TYLENOL) 500 MG TABS, Take 500-1,000 mg by mouth every 6 hours as needed., Disp: , Rfl:   •  Cholecalciferol (VITAMIN D) 2000 UNITS CAPS, Take  by mouth every day., Disp: , Rfl:   ALLERGIES:   Allergies   Allergen Reactions   • Asa [Aspirin]      GI bleeding   • Ciprofloxacin Rash   • Clindamycin Hives   • Codeine Vomiting   • Food Anxiety     Yellow dye, peanuts and peanut products, all peppers,tomato, potato,wheat, gluten   • Hydrocortisone      Suicidal after Cortisone injection   • Ibuprofen      Abdominal pain   • Imitrex [Sumatriptan Succinate] Vomiting   •  "Inderal [Propranolol Hcl]      Not sure of reaction   • Lyrica      suicidal   • Mellaril      Psychotic episode   • Other Drug      All mycins,cillins,and cyclines   • Pcn [Penicillins] Hives   • Ropinirole Hcl [Requip] Shortness of Breath   • Shellfish Allergy      headaches   • Sulfa Drugs Itching   • Tagamet [Cimetidine]      Mental confusion     SURGHX:   Past Surgical History:   Procedure Laterality Date   • HYSTEROSCOPY WITH VIDEO DIAGNOSTIC  9/5/2013    Performed by Portillo Barragan M.D. at SURGERY SAME DAY [facility] ORS   • DILATION AND CURETTAGE  9/5/2013    Performed by Portillo Barragan M.D. at SURGERY SAME DAY [facility] ORS   • OTHER      cataracts   • OTHER ORTHOPEDIC SURGERY     • TONSILLECTOMY       SOCHX:  reports that she quit smoking about 64 years ago. Her smoking use included cigarettes. She has a 3.00 pack-year smoking history. She has never used smokeless tobacco. She reports that she does not drink alcohol or use drugs.  FH: Family history was reviewed, no pertinent findings to report   Objective:   /78   Pulse 86   Temp 36.6 °C (97.9 °F) (Temporal)   Ht 1.6 m (5' 3\")   Wt 78.5 kg (173 lb)   LMP 01/01/1983   SpO2 94%   BMI 30.65 kg/m²   Physical Exam   Constitutional: She is oriented to person, place, and time. She appears well-developed and well-nourished.  Non-toxic appearance. No distress.   HENT:   Head: Normocephalic and atraumatic.   Right Ear: External ear normal.   Left Ear: External ear normal.   Nose: Nose normal.   Eyes: Conjunctivae and lids are normal.   Neck: Neck supple.   Cardiovascular: Normal rate and regular rhythm.   Pulmonary/Chest: Effort normal and breath sounds normal. No respiratory distress.   Abdominal: Soft. Normal appearance. There is no tenderness. There is no rigidity, no rebound, no guarding and no CVA tenderness.   Musculoskeletal:   Normal range of motion. Exhibits no edema and no tenderness.    Neurological: She is alert and oriented to person, " place, and time. No cranial nerve deficit or sensory deficit.   Skin: Skin is warm, dry and intact. Capillary refill takes less than 2 seconds.   Psychiatric: She has a normal mood and affect. Her speech is normal and behavior is normal. Judgment and thought content normal. Cognition and memory are normal.   Vitals reviewed.        Assessment/Plan:   1. Acute cystitis with hematuria  - Urine Culture; Future  - nitrofurantoin monohyd macro (MACROBID) 100 MG Cap; Take 1 Cap by mouth every 12 hours for 7 days.  Dispense: 10 Cap; Refill: 0    2. Dysuria  - POCT Urinalysis    UA and PE consistent with acute cystitis. Pyelonephritis unlikely as pt has no flank pain, CVA tenderness, N/V, F/C.     Finish entire course of antibiotics even if symptoms resolve earlier.  Take a probiotic or eat Karma’s yogurt or kefir while taking the medication.  Drink 8, 8oz glasses of water every day.  If you have frequent UTIs or develop them after sexual intercourse try taking D-Mannose 1000mg, two or three times a day or for 3 days following intercourse.  If symptoms fail to improve in 48 hours, worsen or you develop fever, flank pain, nausea, vomiting, or other new symptoms return to the clinic immediately or go the ER.    Differential diagnosis, natural history, supportive care, and indications for immediate follow-up discussed.

## 2019-09-03 ENCOUNTER — OFFICE VISIT (OUTPATIENT)
Dept: MEDICAL GROUP | Facility: PHYSICIAN GROUP | Age: 79
End: 2019-09-03
Payer: MEDICARE

## 2019-09-03 VITALS
RESPIRATION RATE: 16 BRPM | OXYGEN SATURATION: 93 % | SYSTOLIC BLOOD PRESSURE: 120 MMHG | DIASTOLIC BLOOD PRESSURE: 70 MMHG | TEMPERATURE: 98.2 F | HEART RATE: 83 BPM | HEIGHT: 63 IN | BODY MASS INDEX: 30.83 KG/M2 | WEIGHT: 174 LBS

## 2019-09-03 DIAGNOSIS — B35.1 TOENAIL FUNGUS: ICD-10-CM

## 2019-09-03 DIAGNOSIS — E03.9 ACQUIRED HYPOTHYROIDISM: ICD-10-CM

## 2019-09-03 DIAGNOSIS — Z09 HOSPITAL DISCHARGE FOLLOW-UP: ICD-10-CM

## 2019-09-03 DIAGNOSIS — Z87.19 HISTORY OF HIATAL HERNIA: ICD-10-CM

## 2019-09-03 DIAGNOSIS — E11.9 DIABETES MELLITUS TYPE 2, NONINSULIN DEPENDENT (HCC): ICD-10-CM

## 2019-09-03 DIAGNOSIS — K21.00 GASTROESOPHAGEAL REFLUX DISEASE WITH ESOPHAGITIS: ICD-10-CM

## 2019-09-03 PROCEDURE — 99214 OFFICE O/P EST MOD 30 MIN: CPT | Performed by: NURSE PRACTITIONER

## 2019-09-03 NOTE — ASSESSMENT & PLAN NOTE
Needs new meter today.  Orders sent to pharmacy. Due for labs prior to our next appointment.  Reports morning BS at 129 this morning.

## 2019-09-03 NOTE — PROGRESS NOTES
Chief Complaint   Patient presents with   • Hospital Follow-up       Subjective:   Bailey White is a 79 y.o. female here today for evaluation and management of:    Hospital discharge follow-up  Recently hospitalized in [facility] for diarrhea, vomitting.  NATALIE Shettyan.   Reports that she is eating and drinking normally.  Diarrhea is resolving.  She states the diarrhea occurred after constipation.  Reports recent UTI and seen in UC.  Hot urine.  Currently being treated for UTI macrodantin. Past hx of kidney infection in the past.  Reports that she is feeling better now on the macrobid. Culture was positive for E coli.  Has finished antibiotic.  No fever, chills, reported.     Diabetes mellitus type 2, noninsulin dependent  Needs new meter today.  Orders sent to pharmacy. Due for labs prior to our next appointment.  Reports morning BS at 129 this morning.      Toenail fungus  2nd toe on left with thickened yellow toenail.  No recent injury reported.             Current medicines (including changes today)  Current Outpatient Medications   Medication Sig Dispense Refill   • Coenzyme Q10 (CO Q 10 PO) Take  by mouth.     • Blood Glucose Monitoring Suppl Device Meter: Dispense One Touch Ultra 2 meter. Sig. Use as directed for blood sugar monitoring. 1 Device 0   • nitrofurantoin monohyd macro (MACROBID) 100 MG Cap Take 1 Cap by mouth every 12 hours for 7 days. 10 Cap 0   • atorvastatin (LIPITOR) 20 MG Tab Take 1 Tab by mouth every day. 90 Tab 3   • clopidogrel (PLAVIX) 75 MG Tab Take 1 Tab by mouth every day. 90 Tab 3   • glipiZIDE SR (GLUCOTROL) 5 MG TABLET SR 24 HR Take 1 Tab by mouth every day. 90 Tab 3   • amLODIPine (NORVASC) 5 MG Tab Take 1 Tab by mouth every day. 90 Tab 3   • levothyroxine (SYNTHROID) 75 MCG Tab TAKE 1 TABLET EVERY MORNING ON AN EMPTY STOMACH 90 Tab 2   • omeprazole (PRILOSEC) 20 MG delayed-release capsule TAKE 1 CAPSULE EVERY DAY 90 Cap 2   • acyclovir (ZOVIRAX) 200 MG Cap TAKE 1 CAPSULE  "EVERY DAY 90 Cap 3   • Cranberry 200 MG Cap Take  by mouth 2 Times a Day.     • Omega-3 Fatty Acids (FISH OIL) 1000 MG Cap capsule Take 1,000 mg by mouth 3 times a day, with meals.     • Docusate Sodium (COLACE PO) Take  by mouth.     • acetaminophen (TYLENOL) 500 MG TABS Take 500-1,000 mg by mouth every 6 hours as needed.     • Cholecalciferol (VITAMIN D) 2000 UNITS CAPS Take  by mouth every day.     • Magnesium Gluconate (MAGNESIUM 27 PO) Take  by mouth.     • Blood Glucose Monitoring Suppl Device Meter: Dispense Device of Insurance Preference. Sig. Use as directed for blood sugar monitoring. #1. NR. 1 Device 0     No current facility-administered medications for this visit.      She  has a past medical history of Anesthesia, Arthritis, Blood transfusion, Bronchitis (2011), Carotid stenosis, CATARACT, CVA (cerebral vascular accident) ([facility]), Diabetes, Fibromyalgia, Fibromyalgia, GERD (gastroesophageal reflux disease), Heart burn, Herpes genital, Hiatus hernia syndrome, HTN, transient ischemic attack (TIA), Indigestion, Insomnia, Migraine, Pain, Seizure ([facility]), Thyroid disease, TIA, Unspecified hemorrhagic conditions, Unspecified urinary incontinence, and Urinary bladder disorder. She also has no past medical history of ASTHMA or Breast cancer ([facility]).    ROS as stated in hpi  No chest pain, no shortness of breath, no abdominal pain       Objective:     /70 (BP Location: Left arm, Patient Position: Sitting)   Pulse 83   Temp 36.8 °C (98.2 °F) (Temporal)   Resp 16   Ht 1.6 m (5' 3\")   Wt 78.9 kg (174 lb)   SpO2 93%  Body mass index is 30.82 kg/m². stable.   Physical Exam:  Constitutional: Alert, no distress.  Skin: Warm, dry, good turgor,no cyanosis, no rashes in visible areas. 2nd left tow with thickened yellow nail, no bruising.   Eye: Equal, round and reactive, conjunctiva clear, lids normal.  Ears: No tenderness, no discharge.  External canals are without any significant edema or erythema.    Gross " auditory acuity is intact.  Nose: symmetrical without tenderness, no discharge.  Mouth/Throat: lips without lesion.  Oropharynx clear.  Neck: Trachea midline, no masses, no obvious thyroid enlargement... Range of motion within normal limits.  Neuro: Cranial nerves 2-12 grossly intact.  No sensory deficit.  Respiratory: Unlabored respiratory effort, lungs clear to auscultation, no wheezes, no ronchi.  Cardiovascular: Normal S1, S2, no murmur, no edema.  Abdomen: Soft, non-tender, no masses, no guarding,  no hepatosplenomegaly. NO CVA tenderness  Psych: Alert and oriented x3, normal affect and mood and judgement.        Assessment and Plan:   The following treatment plan was discussed    1. Hospital discharge follow-up  Here for hospital follow up.  Acute gastroenteritis, possible UTI.  Improved after fluids, zofran and treatment for UTI.  Monitor.     2. History of hiatal hernia  Chronic, ongoing. Having severe GERD despite omeprazole.  Requesting referral.  Monitor.   - REFERRAL TO GASTROENTEROLOGY    3. Gastroesophageal reflux disease with esophagitis  See #2  - REFERRAL TO GASTROENTEROLOGY    4. Diabetes mellitus type 2, noninsulin dependent (HCC)  Chronic, ongoing, due for labs, needs new meter.  Order provided.  Recommended [facility] blood sugar log prior to upcoming appointment to focus on diabetes.   - CBC WITH DIFFERENTIAL; Future  - Comp Metabolic Panel; Future  - HEMOGLOBIN A1C; Future  - MICROALBUMIN CREAT RATIO URINE; Future  - Lipid Profile; Future    5. Toenail fungus  This is a new problem to me.  Chronic, ongoing.  Recommended daily soaks, OTC cream.  Foot care discussed.  Monitor and follow.     6. Acquired hypothyroidism  Chronic, ongoing. Due for labs.  Orders provided. Monitor, follow and adjust as needed.  - TSH; Future      Followup: Return in about 3 months (around 12/3/2019) for Diabetes.         Educated in proper administration of medication(s) ordered today including safety, possible SE, risks,  benefits, rationale and alternatives to therapy.     Please note that this dictation was created using voice recognition software. I have made every reasonable attempt to correct obvious errors, but I expect that there are errors of grammar and possibly content that I did not discover before finalizing the note.

## 2019-09-03 NOTE — ASSESSMENT & PLAN NOTE
Recently hospitalized in [facility] ER for diarrhea, vomitting.  IV Zofran.   Reports that she is eating and drinking normally.  Diarrhea is resolving.  She states the diarrhea occurred after constipation.  Reports recent UTI and seen in UC.  Hot urine.  Currently being treated for UTI macrodantin. Past hx of kidney infection in the past.  Reports that she is feeling better now on the macrobid. Culture was positive for E coli.  Has finished antibiotic.  No fever, chills, reported.

## 2019-09-11 ENCOUNTER — TELEPHONE (OUTPATIENT)
Dept: URGENT CARE | Facility: PHYSICIAN GROUP | Age: 79
End: 2019-09-11

## 2019-10-03 NOTE — TELEPHONE ENCOUNTER
Was the patient seen in the last year in this department? Yes     Does patient have an active prescription for medications requested? No     Received Request Via: Patient  
Was the patient seen in the last year in this department? Yes     Does patient have an active prescription for medications requested? No     Received Request Via: Pharmacy  
04-Oct-2019 04:25

## 2019-10-09 DIAGNOSIS — E03.9 HYPOTHYROIDISM, UNSPECIFIED TYPE: ICD-10-CM

## 2019-10-09 DIAGNOSIS — R10.31 RIGHT LOWER QUADRANT ABDOMINAL PAIN: ICD-10-CM

## 2019-10-09 DIAGNOSIS — L98.9 SKIN LESION OF FACE: ICD-10-CM

## 2019-10-09 NOTE — TELEPHONE ENCOUNTER
Was the patient seen in the last year in this department? Yes LOV 09/03/19    Does patient have an active prescription for medications requested? No     Received Request Via: Pharmacy

## 2019-10-10 RX ORDER — OMEPRAZOLE 20 MG/1
CAPSULE, DELAYED RELEASE ORAL
Qty: 90 CAP | Refills: 0 | Status: SHIPPED | OUTPATIENT
Start: 2019-10-10 | End: 2020-03-11

## 2019-10-10 RX ORDER — ACYCLOVIR 200 MG/1
CAPSULE ORAL
Qty: 90 CAP | Refills: 0 | Status: SHIPPED | OUTPATIENT
Start: 2019-10-10 | End: 2019-12-03 | Stop reason: SDUPTHER

## 2019-10-10 RX ORDER — LEVOTHYROXINE SODIUM 0.07 MG/1
TABLET ORAL
Qty: 90 TAB | Refills: 0 | Status: SHIPPED | OUTPATIENT
Start: 2019-10-10 | End: 2019-12-03

## 2019-11-30 ENCOUNTER — HOSPITAL ENCOUNTER (OUTPATIENT)
Dept: LAB | Facility: MEDICAL CENTER | Age: 79
End: 2019-11-30
Attending: NURSE PRACTITIONER
Payer: MEDICARE

## 2019-11-30 DIAGNOSIS — E11.9 DIABETES MELLITUS TYPE 2, NONINSULIN DEPENDENT (HCC): ICD-10-CM

## 2019-11-30 DIAGNOSIS — E03.9 ACQUIRED HYPOTHYROIDISM: ICD-10-CM

## 2019-11-30 LAB
ALBUMIN SERPL BCP-MCNC: 4 G/DL (ref 3.2–4.9)
ALBUMIN/GLOB SERPL: 1.4 G/DL
ALP SERPL-CCNC: 97 U/L (ref 30–99)
ALT SERPL-CCNC: 17 U/L (ref 2–50)
ANION GAP SERPL CALC-SCNC: 10 MMOL/L (ref 0–11.9)
AST SERPL-CCNC: 15 U/L (ref 12–45)
BASOPHILS # BLD AUTO: 0.4 % (ref 0–1.8)
BASOPHILS # BLD: 0.02 K/UL (ref 0–0.12)
BILIRUB SERPL-MCNC: 0.5 MG/DL (ref 0.1–1.5)
BUN SERPL-MCNC: 19 MG/DL (ref 8–22)
CALCIUM SERPL-MCNC: 9.5 MG/DL (ref 8.5–10.5)
CHLORIDE SERPL-SCNC: 106 MMOL/L (ref 96–112)
CHOLEST SERPL-MCNC: 139 MG/DL (ref 100–199)
CO2 SERPL-SCNC: 25 MMOL/L (ref 20–33)
CREAT SERPL-MCNC: 0.79 MG/DL (ref 0.5–1.4)
CREAT UR-MCNC: 92.9 MG/DL
EOSINOPHIL # BLD AUTO: 0.24 K/UL (ref 0–0.51)
EOSINOPHIL NFR BLD: 4.4 % (ref 0–6.9)
ERYTHROCYTE [DISTWIDTH] IN BLOOD BY AUTOMATED COUNT: 48.5 FL (ref 35.9–50)
EST. AVERAGE GLUCOSE BLD GHB EST-MCNC: 206 MG/DL
FASTING STATUS PATIENT QL REPORTED: NORMAL
GLOBULIN SER CALC-MCNC: 2.9 G/DL (ref 1.9–3.5)
GLUCOSE SERPL-MCNC: 187 MG/DL (ref 65–99)
HBA1C MFR BLD: 8.8 % (ref 0–5.6)
HCT VFR BLD AUTO: 43.1 % (ref 37–47)
HDLC SERPL-MCNC: 49 MG/DL
HGB BLD-MCNC: 14.5 G/DL (ref 12–16)
IMM GRANULOCYTES # BLD AUTO: 0 K/UL (ref 0–0.11)
IMM GRANULOCYTES NFR BLD AUTO: 0 % (ref 0–0.9)
LDLC SERPL CALC-MCNC: 71 MG/DL
LYMPHOCYTES # BLD AUTO: 1.96 K/UL (ref 1–4.8)
LYMPHOCYTES NFR BLD: 35.6 % (ref 22–41)
MCH RBC QN AUTO: 35.1 PG (ref 27–33)
MCHC RBC AUTO-ENTMCNC: 33.6 G/DL (ref 33.6–35)
MCV RBC AUTO: 104.4 FL (ref 81.4–97.8)
MICROALBUMIN UR-MCNC: 2.2 MG/DL
MICROALBUMIN/CREAT UR: 24 MG/G (ref 0–30)
MONOCYTES # BLD AUTO: 0.66 K/UL (ref 0–0.85)
MONOCYTES NFR BLD AUTO: 12 % (ref 0–13.4)
NEUTROPHILS # BLD AUTO: 2.63 K/UL (ref 2–7.15)
NEUTROPHILS NFR BLD: 47.6 % (ref 44–72)
NRBC # BLD AUTO: 0 K/UL
NRBC BLD-RTO: 0 /100 WBC
PLATELET # BLD AUTO: 200 K/UL (ref 164–446)
PMV BLD AUTO: 10.4 FL (ref 9–12.9)
POTASSIUM SERPL-SCNC: 4.1 MMOL/L (ref 3.6–5.5)
PROT SERPL-MCNC: 6.9 G/DL (ref 6–8.2)
RBC # BLD AUTO: 4.13 M/UL (ref 4.2–5.4)
SODIUM SERPL-SCNC: 141 MMOL/L (ref 135–145)
TRIGL SERPL-MCNC: 97 MG/DL (ref 0–149)
TSH SERPL DL<=0.005 MIU/L-ACNC: 6.64 UIU/ML (ref 0.38–5.33)
WBC # BLD AUTO: 5.5 K/UL (ref 4.8–10.8)

## 2019-11-30 PROCEDURE — 84443 ASSAY THYROID STIM HORMONE: CPT

## 2019-11-30 PROCEDURE — 82570 ASSAY OF URINE CREATININE: CPT

## 2019-11-30 PROCEDURE — 80061 LIPID PANEL: CPT

## 2019-11-30 PROCEDURE — 80053 COMPREHEN METABOLIC PANEL: CPT

## 2019-11-30 PROCEDURE — 85025 COMPLETE CBC W/AUTO DIFF WBC: CPT

## 2019-11-30 PROCEDURE — 82043 UR ALBUMIN QUANTITATIVE: CPT

## 2019-11-30 PROCEDURE — 36415 COLL VENOUS BLD VENIPUNCTURE: CPT

## 2019-11-30 PROCEDURE — 83036 HEMOGLOBIN GLYCOSYLATED A1C: CPT | Mod: GA

## 2019-12-03 ENCOUNTER — OFFICE VISIT (OUTPATIENT)
Dept: MEDICAL GROUP | Facility: PHYSICIAN GROUP | Age: 79
End: 2019-12-03
Payer: MEDICARE

## 2019-12-03 VITALS
HEIGHT: 63 IN | RESPIRATION RATE: 14 BRPM | BODY MASS INDEX: 31.71 KG/M2 | SYSTOLIC BLOOD PRESSURE: 132 MMHG | WEIGHT: 179 LBS | TEMPERATURE: 96.5 F | DIASTOLIC BLOOD PRESSURE: 70 MMHG | OXYGEN SATURATION: 95 % | HEART RATE: 73 BPM

## 2019-12-03 DIAGNOSIS — I10 ESSENTIAL HYPERTENSION: ICD-10-CM

## 2019-12-03 DIAGNOSIS — E11.9 DIABETES MELLITUS TYPE 2, NONINSULIN DEPENDENT (HCC): ICD-10-CM

## 2019-12-03 DIAGNOSIS — L98.9 SKIN LESION OF FACE: ICD-10-CM

## 2019-12-03 DIAGNOSIS — E03.9 HYPOTHYROIDISM, UNSPECIFIED TYPE: ICD-10-CM

## 2019-12-03 DIAGNOSIS — K21.00 GASTROESOPHAGEAL REFLUX DISEASE WITH ESOPHAGITIS: ICD-10-CM

## 2019-12-03 DIAGNOSIS — I63.9 CEREBROVASCULAR ACCIDENT (CVA), UNSPECIFIED MECHANISM (HCC): ICD-10-CM

## 2019-12-03 PROCEDURE — 99214 OFFICE O/P EST MOD 30 MIN: CPT | Performed by: NURSE PRACTITIONER

## 2019-12-03 RX ORDER — LEVOTHYROXINE SODIUM 88 UG/1
88 TABLET ORAL
Qty: 90 TAB | Refills: 1 | Status: SHIPPED | OUTPATIENT
Start: 2019-12-03 | End: 2020-04-13

## 2019-12-03 RX ORDER — ACYCLOVIR 200 MG/1
CAPSULE ORAL
Qty: 90 CAP | Refills: 3 | Status: SHIPPED | OUTPATIENT
Start: 2019-12-03 | End: 2020-04-13

## 2019-12-03 NOTE — PROGRESS NOTES
Chief Complaint   Patient presents with   • Follow-Up   • Diabetes   • Dyslipidemia   • Hypertension   • Hypothyroidism       Subjective:   Bailey White is a 79 y.o. female here today for evaluation and management of:    Diabetes mellitus type 2, noninsulin dependent  A1c 8.8 today.  Eating mostly carbs, very little protein.      Gastroesophageal reflux disease with esophagitis  Seeing Dr. Locke at [facility] ([place]  History of GERD, hiatal hernia, PH probe set up for end of  December.     Essential hypertension  bp today at goal 132/70.  Weight is up 5 pounds.  Taking amlodipine daily    CVA (Cerebral Vascular Accident), h/o  On daily plavix post stroke.            Current medicines (including changes today)  Current Outpatient Medications   Medication Sig Dispense Refill   • levothyroxine (SYNTHROID) 88 MCG Tab Take 1 Tab by mouth Every morning on an empty stomach. 90 Tab 1   • acyclovir (ZOVIRAX) 200 MG Cap TAKE 1 CAPSULE EVERY DAY 90 Cap 3   • Coenzyme Q10 (CO Q 10 PO) Take  by mouth.     • atorvastatin (LIPITOR) 20 MG Tab Take 1 Tab by mouth every day. 90 Tab 3   • clopidogrel (PLAVIX) 75 MG Tab Take 1 Tab by mouth every day. 90 Tab 3   • glipiZIDE SR (GLUCOTROL) 5 MG TABLET SR 24 HR Take 1 Tab by mouth every day. 90 Tab 3   • amLODIPine (NORVASC) 5 MG Tab Take 1 Tab by mouth every day. 90 Tab 3   • Cranberry 200 MG Cap Take  by mouth 2 Times a Day.     • Docusate Sodium (COLACE PO) Take  by mouth.     • acetaminophen (TYLENOL) 500 MG TABS Take 500-1,000 mg by mouth every 6 hours as needed.     • Cholecalciferol (VITAMIN D) 2000 UNITS CAPS Take  by mouth every day.     • omeprazole (PRILOSEC) 20 MG delayed-release capsule TAKE 1 CAPSULE EVERY DAY 90 Cap 0   • Blood Glucose Monitoring Suppl Device Meter: Dispense One Touch Ultra 2 meter. Sig. Use as directed for blood sugar monitoring. 1 Device 0   • Magnesium Gluconate (MAGNESIUM 27 PO) Take  by mouth.     • Omega-3 Fatty Acids (FISH OIL) 1000 MG Cap capsule  "Take 1,000 mg by mouth 3 times a day, with meals.     • Blood Glucose Monitoring Suppl Device Meter: Dispense Device of Insurance Preference. Sig. Use as directed for blood sugar monitoring. #1. NR. 1 Device 0     No current facility-administered medications for this visit.      She  has a past medical history of Anesthesia, Arthritis, Blood transfusion, Bronchitis (2011), Carotid stenosis, CATARACT, CVA (cerebral vascular accident) ([facility]), Diabetes, Fibromyalgia, Fibromyalgia, GERD (gastroesophageal reflux disease), Heart burn, Herpes genital, Hiatus hernia syndrome, HTN, transient ischemic attack (TIA), Indigestion, Insomnia, Migraine, Pain, Seizure ([facility]), Thyroid disease, TIA, Unspecified hemorrhagic conditions, Unspecified urinary incontinence, and Urinary bladder disorder. She also has no past medical history of ASTHMA or Breast cancer ([facility]).    ROS as stated in hpi  No chest pain, no shortness of breath, no abdominal pain       Objective:     /70 (BP Location: Left arm, Patient Position: Sitting)   Pulse 73   Temp 35.8 °C (96.5 °F) (Temporal)   Resp 14   Ht 1.6 m (5' 3\")   Wt 81.2 kg (179 lb)   SpO2 95%  Body mass index is 31.71 kg/m². stable.   Physical Exam:  Constitutional: Alert, no distress.  Skin: Warm, dry, good turgor,no cyanosis, no rashes in visible areas.  Eye: Equal, round and reactive, conjunctiva clear, lids normal.  Ears: No tenderness, no discharge.  External canals are without any significant edema or erythema.  Gross auditory acuity is intact.  Nose: symmetrical without tenderness, no discharge.  Mouth/Throat: lips without lesion.  Oropharynx clear.  Neck: Trachea midline, no masses, no obvious thyroid enlargementRange of motion within normal limits.  Neuro: Cranial nerves 2-12 grossly intact.  No sensory deficit.  Respiratory: Unlabored respiratory effort, lungs clear to auscultation, no wheezes, no ronchi.  Cardiovascular: Normal S1, S2, no murmur, no edema.  Abdomen: Soft, " non-tender, no masses, no guarding,  no hepatosplenomegaly.  Psych: Alert and oriented x3, normal affect and mood and judgement.        Assessment and Plan:   The following treatment plan was discussed    1. Hypothyroidism, unspecified type  Chronic, ongoing. TSH elevated at 6.6.  Increase to 88 mcg daily.  Recheck TSH in 2 months.  + weight gain.  Monitor and follow.   - TSH; Future    2. Diabetes mellitus type 2, noninsulin dependent (HCC)  Chronic, ongoing. A1c elevated from 8.2 to 8.8.  Eating mostly carbs.  Discussed at length diet with focus on protein, veggies and less carbs.  Recheck in 3 months in office.  Monitor and follow.     3. Skin lesion of face  Chronic, ongoing. Stable.  Refill provided today.   - acyclovir (ZOVIRAX) 200 MG Cap; TAKE 1 CAPSULE EVERY DAY  Dispense: 90 Cap; Refill: 3    4. Gastroesophageal reflux disease with esophagitis  Chronic ongoing worsening.  Followed by Dr. Locke at [facility].  Has ph probe scheduled in December.  Monitor.     5. Essential hypertension  Chronic, ongoing, at goal.  Continue amlodipine.  Monitor and follow.     6. Cerebrovascular accident (CVA), unspecified mechanism (HCC)  History of.  Chronic, stable.  On plavix. Daily.  No recent changes. On statin.   Monitor and follow.       Followup: Return in about 3 months (around 3/3/2020) for Diabetes.         Educated in proper administration of medication(s) ordered today including safety, possible SE, risks, benefits, rationale and alternatives to therapy.     Please note that this dictation was created using voice recognition software. I have made every reasonable attempt to correct obvious errors, but I expect that there are errors of grammar and possibly content that I did not discover before finalizing the note.

## 2019-12-03 NOTE — ASSESSMENT & PLAN NOTE
Seeing Dr. Locke at [facility].  History of GERD, hiatal hernia, PH probe set up for end of December.

## 2019-12-04 ENCOUNTER — PATIENT MESSAGE (OUTPATIENT)
Dept: MEDICAL GROUP | Facility: PHYSICIAN GROUP | Age: 79
End: 2019-12-04

## 2020-02-13 ENCOUNTER — HOSPITAL ENCOUNTER (OUTPATIENT)
Dept: LAB | Facility: MEDICAL CENTER | Age: 80
End: 2020-02-13
Attending: INTERNAL MEDICINE
Payer: MEDICARE

## 2020-02-13 ENCOUNTER — HOSPITAL ENCOUNTER (OUTPATIENT)
Dept: LAB | Facility: MEDICAL CENTER | Age: 80
End: 2020-02-13
Attending: NURSE PRACTITIONER
Payer: MEDICARE

## 2020-02-13 DIAGNOSIS — E03.9 HYPOTHYROIDISM, UNSPECIFIED TYPE: ICD-10-CM

## 2020-02-13 LAB
ALBUMIN SERPL BCP-MCNC: 4.5 G/DL (ref 3.2–4.9)
ALBUMIN/GLOB SERPL: 1.5 G/DL
ALP SERPL-CCNC: 82 U/L (ref 30–99)
ALT SERPL-CCNC: 25 U/L (ref 2–50)
ANION GAP SERPL CALC-SCNC: 7 MMOL/L (ref 0–11.9)
APPEARANCE UR: ABNORMAL
AST SERPL-CCNC: 19 U/L (ref 12–45)
BACTERIA #/AREA URNS HPF: NEGATIVE /HPF
BASOPHILS # BLD AUTO: 0.2 % (ref 0–1.8)
BASOPHILS # BLD: 0.01 K/UL (ref 0–0.12)
BILIRUB SERPL-MCNC: 0.6 MG/DL (ref 0.1–1.5)
BILIRUB UR QL STRIP.AUTO: NEGATIVE
BUN SERPL-MCNC: 21 MG/DL (ref 8–22)
CALCIUM SERPL-MCNC: 9.7 MG/DL (ref 8.5–10.5)
CHLORIDE SERPL-SCNC: 104 MMOL/L (ref 96–112)
CO2 SERPL-SCNC: 26 MMOL/L (ref 20–33)
COLOR UR: YELLOW
CREAT SERPL-MCNC: 0.96 MG/DL (ref 0.5–1.4)
EOSINOPHIL # BLD AUTO: 0.3 K/UL (ref 0–0.51)
EOSINOPHIL NFR BLD: 5.2 % (ref 0–6.9)
EPI CELLS #/AREA URNS HPF: ABNORMAL /HPF
ERYTHROCYTE [DISTWIDTH] IN BLOOD BY AUTOMATED COUNT: 48.6 FL (ref 35.9–50)
GLOBULIN SER CALC-MCNC: 3.1 G/DL (ref 1.9–3.5)
GLUCOSE SERPL-MCNC: 247 MG/DL (ref 65–99)
GLUCOSE UR STRIP.AUTO-MCNC: 100 MG/DL
HCT VFR BLD AUTO: 44.9 % (ref 37–47)
HGB BLD-MCNC: 15.4 G/DL (ref 12–16)
HYALINE CASTS #/AREA URNS LPF: ABNORMAL /LPF
IMM GRANULOCYTES # BLD AUTO: 0.02 K/UL (ref 0–0.11)
IMM GRANULOCYTES NFR BLD AUTO: 0.3 % (ref 0–0.9)
KETONES UR STRIP.AUTO-MCNC: NEGATIVE MG/DL
LEUKOCYTE ESTERASE UR QL STRIP.AUTO: NEGATIVE
LYMPHOCYTES # BLD AUTO: 1.69 K/UL (ref 1–4.8)
LYMPHOCYTES NFR BLD: 29.1 % (ref 22–41)
MCH RBC QN AUTO: 35.6 PG (ref 27–33)
MCHC RBC AUTO-ENTMCNC: 34.3 G/DL (ref 33.6–35)
MCV RBC AUTO: 103.7 FL (ref 81.4–97.8)
MICRO URNS: ABNORMAL
MONOCYTES # BLD AUTO: 0.61 K/UL (ref 0–0.85)
MONOCYTES NFR BLD AUTO: 10.5 % (ref 0–13.4)
NEUTROPHILS # BLD AUTO: 3.18 K/UL (ref 2–7.15)
NEUTROPHILS NFR BLD: 54.7 % (ref 44–72)
NITRITE UR QL STRIP.AUTO: NEGATIVE
NRBC # BLD AUTO: 0 K/UL
NRBC BLD-RTO: 0 /100 WBC
PH UR STRIP.AUTO: 5.5 [PH] (ref 5–8)
PLATELET # BLD AUTO: 218 K/UL (ref 164–446)
PMV BLD AUTO: 10.4 FL (ref 9–12.9)
POTASSIUM SERPL-SCNC: 4.3 MMOL/L (ref 3.6–5.5)
PROT SERPL-MCNC: 7.6 G/DL (ref 6–8.2)
PROT UR QL STRIP: NEGATIVE MG/DL
RBC # BLD AUTO: 4.33 M/UL (ref 4.2–5.4)
RBC # URNS HPF: ABNORMAL /HPF
RBC UR QL AUTO: NEGATIVE
SODIUM SERPL-SCNC: 137 MMOL/L (ref 135–145)
SP GR UR STRIP.AUTO: 1.03
TSH SERPL DL<=0.005 MIU/L-ACNC: 2.4 UIU/ML (ref 0.38–5.33)
UROBILINOGEN UR STRIP.AUTO-MCNC: 0.2 MG/DL
VIT B12 SERPL-MCNC: 460 PG/ML (ref 211–911)
WBC # BLD AUTO: 5.8 K/UL (ref 4.8–10.8)
WBC #/AREA URNS HPF: ABNORMAL /HPF

## 2020-02-13 PROCEDURE — 85025 COMPLETE CBC W/AUTO DIFF WBC: CPT

## 2020-02-13 PROCEDURE — 80053 COMPREHEN METABOLIC PANEL: CPT

## 2020-02-13 PROCEDURE — 82607 VITAMIN B-12: CPT

## 2020-02-13 PROCEDURE — 84443 ASSAY THYROID STIM HORMONE: CPT

## 2020-02-13 PROCEDURE — 81001 URINALYSIS AUTO W/SCOPE: CPT

## 2020-02-13 PROCEDURE — 36415 COLL VENOUS BLD VENIPUNCTURE: CPT

## 2020-03-06 ENCOUNTER — HOSPITAL ENCOUNTER (OUTPATIENT)
Facility: MEDICAL CENTER | Age: 80
End: 2020-03-06
Attending: PHYSICIAN ASSISTANT
Payer: MEDICARE

## 2020-03-06 ENCOUNTER — OFFICE VISIT (OUTPATIENT)
Dept: URGENT CARE | Facility: PHYSICIAN GROUP | Age: 80
End: 2020-03-06
Payer: MEDICARE

## 2020-03-06 VITALS
OXYGEN SATURATION: 98 % | RESPIRATION RATE: 16 BRPM | HEIGHT: 64 IN | HEART RATE: 67 BPM | DIASTOLIC BLOOD PRESSURE: 80 MMHG | WEIGHT: 176 LBS | BODY MASS INDEX: 30.05 KG/M2 | TEMPERATURE: 97.5 F | SYSTOLIC BLOOD PRESSURE: 130 MMHG

## 2020-03-06 DIAGNOSIS — N30.90 CYSTITIS: ICD-10-CM

## 2020-03-06 DIAGNOSIS — R30.0 DYSURIA: ICD-10-CM

## 2020-03-06 DIAGNOSIS — N30.90 CYSTITIS: Primary | ICD-10-CM

## 2020-03-06 LAB
APPEARANCE UR: NORMAL
BILIRUB UR STRIP-MCNC: NEGATIVE MG/DL
COLOR UR AUTO: YELLOW
GLUCOSE UR STRIP.AUTO-MCNC: 1000 MG/DL
KETONES UR STRIP.AUTO-MCNC: NEGATIVE MG/DL
LEUKOCYTE ESTERASE UR QL STRIP.AUTO: NORMAL
NITRITE UR QL STRIP.AUTO: NEGATIVE
PH UR STRIP.AUTO: 6 [PH] (ref 5–8)
PROT UR QL STRIP: NEGATIVE MG/DL
RBC UR QL AUTO: NORMAL
SP GR UR STRIP.AUTO: 1.02
UROBILINOGEN UR STRIP-MCNC: 0.2 MG/DL

## 2020-03-06 PROCEDURE — 87086 URINE CULTURE/COLONY COUNT: CPT

## 2020-03-06 PROCEDURE — 87186 SC STD MICRODIL/AGAR DIL: CPT

## 2020-03-06 PROCEDURE — 81002 URINALYSIS NONAUTO W/O SCOPE: CPT | Performed by: PHYSICIAN ASSISTANT

## 2020-03-06 PROCEDURE — 87077 CULTURE AEROBIC IDENTIFY: CPT

## 2020-03-06 PROCEDURE — 99214 OFFICE O/P EST MOD 30 MIN: CPT | Mod: 25 | Performed by: PHYSICIAN ASSISTANT

## 2020-03-06 RX ORDER — NITROFURANTOIN 25; 75 MG/1; MG/1
100 CAPSULE ORAL 2 TIMES DAILY
Qty: 10 CAP | Refills: 0 | Status: SHIPPED | OUTPATIENT
Start: 2020-03-06 | End: 2020-03-11

## 2020-03-06 ASSESSMENT — FIBROSIS 4 INDEX: FIB4 SCORE: 1.41

## 2020-03-06 NOTE — PROGRESS NOTES
Subjective:      Brennon White is a 79 y.o. female who presents with Dysuria (freq, pain, urgency, started last night)    PMH:  has a past medical history of Anesthesia, Arthritis, Blood transfusion, Bronchitis (2011), Carotid stenosis, CATARACT, CVA (cerebral vascular accident) ([facility]), Diabetes, Fibromyalgia, Fibromyalgia, GERD (gastroesophageal reflux disease), Heart burn, Herpes genital, Hiatus hernia syndrome, HTN, transient ischemic attack (TIA), Indigestion, Insomnia, Migraine, Pain, Seizure (HCC), Thyroid disease, TIA, Unspecified hemorrhagic conditions, Unspecified urinary incontinence, and Urinary bladder disorder. She also has no past medical history of ASTHMA or Breast cancer ([facility]).  MEDS:   Current Outpatient Medications:   •  levothyroxine (SYNTHROID) 88 MCG Tab, Take 1 Tab by mouth Every morning on an empty stomach., Disp: 90 Tab, Rfl: 1  •  acyclovir (ZOVIRAX) 200 MG Cap, TAKE 1 CAPSULE EVERY DAY, Disp: 90 Cap, Rfl: 3  •  omeprazole (PRILOSEC) 20 MG delayed-release capsule, TAKE 1 CAPSULE EVERY DAY, Disp: 90 Cap, Rfl: 0  •  Coenzyme Q10 (CO Q 10 PO), Take  by mouth., Disp: , Rfl:   •  Blood Glucose Monitoring Suppl Device, Meter: Dispense One Touch Ultra 2 meter. Sig. Use as directed for blood sugar monitoring., Disp: 1 Device, Rfl: 0  •  atorvastatin (LIPITOR) 20 MG Tab, Take 1 Tab by mouth every day., Disp: 90 Tab, Rfl: 3  •  clopidogrel (PLAVIX) 75 MG Tab, Take 1 Tab by mouth every day., Disp: 90 Tab, Rfl: 3  •  glipiZIDE SR (GLUCOTROL) 5 MG TABLET SR 24 HR, Take 1 Tab by mouth every day., Disp: 90 Tab, Rfl: 3  •  amLODIPine (NORVASC) 5 MG Tab, Take 1 Tab by mouth every day., Disp: 90 Tab, Rfl: 3  •  Cranberry 200 MG Cap, Take  by mouth 2 Times a Day., Disp: , Rfl:   •  Omega-3 Fatty Acids (FISH OIL) 1000 MG Cap capsule, Take 1,000 mg by mouth 3 times a day, with meals., Disp: , Rfl:   •  Docusate Sodium (COLACE PO), Take  by mouth., Disp: , Rfl:   •  Blood Glucose Monitoring Suppl Device,  Meter: Dispense Device of Insurance Preference. Sig. Use as directed for blood sugar monitoring. #1. NR., Disp: 1 Device, Rfl: 0  •  acetaminophen (TYLENOL) 500 MG TABS, Take 500-1,000 mg by mouth every 6 hours as needed., Disp: , Rfl:   •  Cholecalciferol (VITAMIN D) 2000 UNITS CAPS, Take  by mouth every day., Disp: , Rfl:   •  Magnesium Gluconate (MAGNESIUM 27 PO), Take  by mouth., Disp: , Rfl:   ALLERGIES:   Allergies   Allergen Reactions   • Asa [Aspirin]      GI bleeding   • Ciprofloxacin Rash   • Clindamycin Hives   • Codeine Vomiting   • Food Anxiety     Yellow dye, peanuts and peanut products, all peppers,tomato, potato,wheat, gluten   • Hydrocortisone      Suicidal after Cortisone injection   • Ibuprofen      Abdominal pain   • Imitrex [Sumatriptan Succinate] Vomiting   • Inderal [Propranolol Hcl]      Not sure of reaction   • Lyrica      suicidal   • Mellaril      Psychotic episode   • Other Drug      All mycins,cillins,and cyclines   • Pcn [Penicillins] Hives   • Ropinirole Hcl [Requip] Shortness of Breath   • Shellfish Allergy      headaches   • Sulfa Drugs Itching   • Tagamet [Cimetidine]      Mental confusion     SURGHX:   Past Surgical History:   Procedure Laterality Date   • HYSTEROSCOPY WITH VIDEO DIAGNOSTIC  9/5/2013    Performed by Portillo Barragan M.D. at SURGERY SAME DAY [facility]   • DILATION AND CURETTAGE  9/5/2013    Performed by Portillo Barragan M.D. at SURGERY SAME DAY [facility]   • OTHER      cataracts   • OTHER ORTHOPEDIC SURGERY     • TONSILLECTOMY       SOCHX:  reports that she quit smoking about 65 years ago. Her smoking use included cigarettes. She has a 3.00 pack-year smoking history. She has never used smokeless tobacco. She reports that she does not drink alcohol or use drugs.  FH: Reviewed with patient, not pertinent to this visit.           Patient presents with:  Dysuria: freq, pain, urgency, started last night        Dysuria    This is a new problem. The current episode  "started yesterday. The problem occurs every urination. The problem has been gradually worsening. The quality of the pain is described as burning. The pain is at a severity of 6/10. The pain is moderate. There has been no fever. She is not sexually active. There is no history of pyelonephritis. Associated symptoms include frequency and urgency. Pertinent negatives include no chills or nausea. She has tried increased fluids for the symptoms. The treatment provided mild relief. Her past medical history is significant for recurrent UTIs.       Review of Systems   Constitutional: Negative for chills.   Gastrointestinal: Negative for nausea.   Genitourinary: Positive for dysuria, frequency and urgency.          Objective:     /80   Pulse 67   Temp 36.4 °C (97.5 °F) (Temporal)   Resp 16   Ht 1.626 m (5' 4\")   Wt 79.8 kg (176 lb)   LMP 01/01/1983   SpO2 98%   BMI 30.21 kg/m²      Physical Exam  Vitals signs and nursing note reviewed.   Constitutional:       General: She is not in acute distress.     Appearance: Normal appearance. She is well-developed. She is not toxic-appearing.   HENT:      Head: Normocephalic and atraumatic.      Nose: Nose normal.      Mouth/Throat:      Mouth: Mucous membranes are moist.   Eyes:      Extraocular Movements: Extraocular movements intact.      Conjunctiva/sclera: Conjunctivae normal.      Pupils: Pupils are equal, round, and reactive to light.   Neck:      Musculoskeletal: Normal range of motion and neck supple.   Cardiovascular:      Rate and Rhythm: Normal rate and regular rhythm.      Pulses: Normal pulses.      Heart sounds: Normal heart sounds.   Pulmonary:      Effort: Pulmonary effort is normal.      Breath sounds: Normal breath sounds.   Abdominal:      General: Bowel sounds are normal.      Palpations: Abdomen is soft.      Tenderness: There is no guarding or rebound.   Musculoskeletal: Normal range of motion.   Skin:     General: Skin is warm and dry.      " Capillary Refill: Capillary refill takes less than 2 seconds.   Neurological:      General: No focal deficit present.      Mental Status: She is alert and oriented to person, place, and time.      Gait: Gait normal.   Psychiatric:         Mood and Affect: Mood normal.         Behavior: Behavior is cooperative.               UA:   Results for RENEA JAMES (MRN [id number]) as of 3/10/2020 16:26   Ref. Range 3/6/2020 09:44   POC Color Latest Ref Range: Negative  yellow   POC Appearance Latest Ref Range: Negative  cloudy   POC Specific Gravity Latest Ref Range: <1.005 - >1.030  1.025   POC Urine PH Latest Ref Range: 5.0 - 8.0  6.0   POC Glucose Latest Ref Range: Negative mg/dL 1,000   POC Ketones Latest Ref Range: Negative mg/dL negative   POC Protein Latest Ref Range: Negative mg/dL negative   POC Nitrites Latest Ref Range: Negative  negative   POC Leukocyte Esterase Latest Ref Range: Negative  trace   POC Blood Latest Ref Range: Negative  trace   POC Bilirubin Latest Ref Range: Negative mg/dL negative   POC Urobiligen Latest Ref Range: Negative (0.2) mg/dL 0.2     Assessment/Plan:     1. Cystitis  POCT Urinalysis    URINE CULTURE(NEW)    nitrofurantoin (MACROBID) 100 MG Cap   2. Dysuria  POCT Urinalysis    URINE CULTURE(NEW)    nitrofurantoin (MACROBID) 100 MG Cap     Culture sent to lab, will call with any necessary treatment or treatment changes.     PT can continue OTC medications, increase fluids and rest until symptoms improve.     PT should follow up with PCP in 1-2 days for re-evaluation if symptoms have not improved.  Discussed red flags and reasons to return to UC or ED.  Pt and/or family verbalized understanding of diagnosis and follow up instructions and was offered informational handout on diagnosis.  PT discharged.

## 2020-03-10 ASSESSMENT — ENCOUNTER SYMPTOMS
NAUSEA: 0
CHILLS: 0

## 2020-03-11 ENCOUNTER — OFFICE VISIT (OUTPATIENT)
Dept: MEDICAL GROUP | Facility: PHYSICIAN GROUP | Age: 80
End: 2020-03-11
Payer: MEDICARE

## 2020-03-11 VITALS
DIASTOLIC BLOOD PRESSURE: 78 MMHG | SYSTOLIC BLOOD PRESSURE: 154 MMHG | WEIGHT: 175 LBS | HEART RATE: 80 BPM | HEIGHT: 63 IN | BODY MASS INDEX: 31.01 KG/M2 | OXYGEN SATURATION: 94 % | TEMPERATURE: 96.9 F

## 2020-03-11 DIAGNOSIS — K21.00 GASTROESOPHAGEAL REFLUX DISEASE WITH ESOPHAGITIS: ICD-10-CM

## 2020-03-11 DIAGNOSIS — R10.31 RIGHT LOWER QUADRANT ABDOMINAL PAIN: ICD-10-CM

## 2020-03-11 DIAGNOSIS — F51.01 PRIMARY INSOMNIA: ICD-10-CM

## 2020-03-11 DIAGNOSIS — K31.84 GASTROPARESIS: ICD-10-CM

## 2020-03-11 DIAGNOSIS — N30.01 ACUTE CYSTITIS WITH HEMATURIA: ICD-10-CM

## 2020-03-11 DIAGNOSIS — L82.1 SK (SEBORRHEIC KERATOSIS): ICD-10-CM

## 2020-03-11 DIAGNOSIS — I10 ESSENTIAL HYPERTENSION: ICD-10-CM

## 2020-03-11 DIAGNOSIS — E11.9 DIABETES MELLITUS TYPE 2, NONINSULIN DEPENDENT (HCC): ICD-10-CM

## 2020-03-11 PROBLEM — B35.1 TOENAIL FUNGUS: Status: RESOLVED | Noted: 2019-09-03 | Resolved: 2020-03-11

## 2020-03-11 PROBLEM — L98.9 SKIN LESION OF FACE: Status: RESOLVED | Noted: 2019-06-03 | Resolved: 2020-03-11

## 2020-03-11 PROCEDURE — 17110 DESTRUCTION B9 LES UP TO 14: CPT | Performed by: NURSE PRACTITIONER

## 2020-03-11 PROCEDURE — 99214 OFFICE O/P EST MOD 30 MIN: CPT | Mod: 25 | Performed by: NURSE PRACTITIONER

## 2020-03-11 RX ORDER — OMEPRAZOLE 40 MG/1
40 CAPSULE, DELAYED RELEASE ORAL 2 TIMES DAILY
Qty: 180 CAP | Refills: 3 | Status: SHIPPED | OUTPATIENT
Start: 2020-03-11 | End: 2021-04-20

## 2020-03-11 RX ORDER — AMLODIPINE BESYLATE 5 MG/1
5 TABLET ORAL
Qty: 30 TAB | Refills: 0 | Status: SHIPPED | OUTPATIENT
Start: 2020-03-11 | End: 2020-04-20

## 2020-03-11 RX ORDER — OMEPRAZOLE 40 MG/1
40 CAPSULE, DELAYED RELEASE ORAL DAILY
COMMUNITY
End: 2020-03-11 | Stop reason: SDUPTHER

## 2020-03-11 ASSESSMENT — PATIENT HEALTH QUESTIONNAIRE - PHQ9
2. FEELING DOWN, DEPRESSED, IRRITABLE, OR HOPELESS: NOT AT ALL
3. TROUBLE FALLING OR STAYING ASLEEP OR SLEEPING TOO MUCH: NEARLY EVERY DAY
9. THOUGHTS THAT YOU WOULD BE BETTER OFF DEAD, OR OF HURTING YOURSELF: NOT AT ALL
7. TROUBLE CONCENTRATING ON THINGS, SUCH AS READING THE NEWSPAPER OR WATCHING TELEVISION: NOT AT ALL
SUM OF ALL RESPONSES TO PHQ9 QUESTIONS 1 AND 2: 0
1. LITTLE INTEREST OR PLEASURE IN DOING THINGS: NOT AT ALL
4. FEELING TIRED OR HAVING LITTLE ENERGY: NEARLY EVERY DAY
8. MOVING OR SPEAKING SO SLOWLY THAT OTHER PEOPLE COULD HAVE NOTICED. OR THE OPPOSITE, BEING SO FIGETY OR RESTLESS THAT YOU HAVE BEEN MOVING AROUND A LOT MORE THAN USUAL: NOT AT ALL
5. POOR APPETITE OR OVEREATING: NOT AT ALL
6. FEELING BAD ABOUT YOURSELF - OR THAT YOU ARE A FAILURE OR HAVE LET YOURSELF OR YOUR FAMILY DOWN: NOT AL ALL
SUM OF ALL RESPONSES TO PHQ QUESTIONS 1-9: 6

## 2020-03-11 ASSESSMENT — FIBROSIS 4 INDEX: FIB4 SCORE: 1.41

## 2020-03-11 NOTE — ASSESSMENT & PLAN NOTE
Seen by GI and followed by GI consultants.  Has some upcoming ultrasound and CT scan for delayed gastric emptying.

## 2020-03-11 NOTE — ASSESSMENT & PLAN NOTE
Recently diagnosed with this issue. Was given reglan but is not taking.  Eating small meals.  Followed by GI consultants.  Has some upcoming testing US and CT

## 2020-03-11 NOTE — ASSESSMENT & PLAN NOTE
Recent fasting blood sugar  Results for RENEA JAMES (MRN [id number]) as of 3/11/2020 16:23   Ref. Range 2/13/2020 09:47   Glucose Latest Ref Range: 65 - 99 mg/dL 247 (H)   Taking glipizide daily.  Not controlling her diabetes. Unable to tolerate metformin. Start januvia 25 mg daily.  Continue with glipizide daily.

## 2020-03-11 NOTE — ASSESSMENT & PLAN NOTE
Being followed by GI consultants.  Has some upcoming testing US and Ct scan.  Delayed gastric emptying.

## 2020-03-12 NOTE — PROGRESS NOTES
Chief Complaint   Patient presents with   • Follow-Up   • Diabetes   • Hypertension   • Hypothyroidism   • Dyslipidemia       Subjective:   Bailey White is a 79 y.o. female here today for evaluation and management of:    Essential hypertension  bp today 154/78.  Taking amlodipine 5 mg daily.     Diabetes mellitus type 2, noninsulin dependent  Recent fasting blood sugar  Results for RENEA WHITE (MRN [id number]) as of 3/11/2020 16:23   Ref. Range 2/13/2020 09:47   Glucose Latest Ref Range: 65 - 99 mg/dL 247 (H)   Taking glipizide daily.  Not controlling her diabetes. Unable to tolerate metformin. Start januvia 25 mg daily.  Continue with glipizide daily.      Right lower quadrant abdominal pain  Seen by GI and followed by GI consultants.  Has some upcoming ultrasound and CT scan for delayed gastric emptying.      Gastroesophageal reflux disease with esophagitis  Being followed by GI consultants.  Has some upcoming testing US and Ct scan.  Delayed gastric emptying.     Gastroparesis  Recently diagnosed with this issue. Was given reglan but is not taking.  Eating small meals.  Followed by GI consultants.  Has some upcoming testing US and CT    SK (seborrheic keratosis)  Right inner calf with irritating 3mm round seborric keratosis.  Will freeze today.     Insomnia  Reports nightly tylenol pm.  Reports hang over throughout the day.  Advised to stop the benadryl product due to age and symptoms.      Acute cystitis with hematuria  Follow up on this issue, seen in UC.  Currently on ABX.  Symptoms have improved.  Will recheck Urine in two weeks for clearance.  Lab order and cup provided.            Current medicines (including changes today)  Current Outpatient Medications   Medication Sig Dispense Refill   • amLODIPine (NORVASC) 5 MG Tab Take 1 Tab by mouth every day. 30 Tab 0   • omeprazole (PRILOSEC) 40 MG delayed-release capsule Take 1 Cap by mouth 2 times a day. 180 Cap 3   • SITagliptin (JANUVIA)  25 MG Tab Take 1 Tab by mouth every day. 30 Tab 3   • nitrofurantoin (MACROBID) 100 MG Cap Take 1 Cap by mouth 2 times a day for 5 days. 10 Cap 0   • levothyroxine (SYNTHROID) 88 MCG Tab Take 1 Tab by mouth Every morning on an empty stomach. 90 Tab 1   • acyclovir (ZOVIRAX) 200 MG Cap TAKE 1 CAPSULE EVERY DAY 90 Cap 3   • Coenzyme Q10 (CO Q 10 PO) Take  by mouth.     • atorvastatin (LIPITOR) 20 MG Tab Take 1 Tab by mouth every day. 90 Tab 3   • clopidogrel (PLAVIX) 75 MG Tab Take 1 Tab by mouth every day. 90 Tab 3   • glipiZIDE SR (GLUCOTROL) 5 MG TABLET SR 24 HR Take 1 Tab by mouth every day. 90 Tab 3   • Cranberry 200 MG Cap Take  by mouth 2 Times a Day.     • Omega-3 Fatty Acids (FISH OIL) 1000 MG Cap capsule Take 1,000 mg by mouth 3 times a day, with meals.     • Docusate Sodium (COLACE PO) Take  by mouth.     • acetaminophen (TYLENOL) 500 MG TABS Take 500-1,000 mg by mouth every 6 hours as needed.     • Cholecalciferol (VITAMIN D) 2000 UNITS CAPS Take  by mouth every day.     • Blood Glucose Monitoring Suppl Device Meter: Dispense One Touch Ultra 2 meter. Sig. Use as directed for blood sugar monitoring. 1 Device 0   • Magnesium Gluconate (MAGNESIUM 27 PO) Take  by mouth.     • Blood Glucose Monitoring Suppl Device Meter: Dispense Device of Insurance Preference. Sig. Use as directed for blood sugar monitoring. #1. NR. 1 Device 0     No current facility-administered medications for this visit.      She  has a past medical history of Anesthesia, Arthritis, Blood transfusion, Bronchitis (2011), Carotid stenosis, CATARACT, CVA (cerebral vascular accident) ([facility]), Diabetes, Fibromyalgia, Fibromyalgia, GERD (gastroesophageal reflux disease), Heart burn, Herpes genital, Hiatus hernia syndrome, HTN, transient ischemic attack (TIA), Indigestion, Insomnia, Migraine, Pain, Seizure (HCC), Thyroid disease, TIA, Unspecified hemorrhagic conditions, Unspecified urinary incontinence, and Urinary bladder disorder. She also has no  "past medical history of ASTHMA or Breast cancer (HCC).    ROS as stated in hpi  No chest pain, no shortness of breath, no abdominal pain       Objective:     /78 (BP Location: Left arm, Patient Position: Sitting)   Pulse 80   Temp 36.1 °C (96.9 °F) (Temporal)   Ht 1.6 m (5' 3\")   Wt 79.4 kg (175 lb)   SpO2 94%  Body mass index is 31 kg/m².   Physical Exam:  Constitutional: Alert, no distress.  Skin: Warm, dry, good turgor,no cyanosis, no rashes in visible areas. 3mm round raised dry, itchy, irritated area on right calf consistent with SK.   Eye: Equal, round and reactive, conjunctiva clear, lids normal.  Ears: No tenderness, no discharge.  External canals are without any significant edema or erythema.  Gross auditory acuity is intact.  Nose: symmetrical without tenderness, no discharge.  Mouth/Throat: lips without lesion.  Oropharynx clear.   Neck: Trachea midline, no masses, no obvious thyroid enlargement.. No cervical or supraclavicular lymphadenopathy. Range of motion within normal limits.  Neuro: Cranial nerves 2-12 grossly intact.  No sensory deficit.  Respiratory: Unlabored respiratory effort, lungs clear to auscultation, no wheezes, no ronchi.  Cardiovascular: Normal S1, S2, no murmur, no edema.  Psych: Alert and oriented x3, normal affect and mood and judgement.        Assessment and Plan:   The following treatment plan was discussed    1. Essential hypertension  Chronic, ongoing. Slightly elevated.  Patient is out of medication.  Refill today provided.  Monitor and follow.   - amLODIPine (NORVASC) 5 MG Tab; Take 1 Tab by mouth every day.  Dispense: 30 Tab; Refill: 0    2. Diabetes mellitus type 2, noninsulin dependent (HCC)  Chronic, ongoing. Not in control.  Add Januvia 25 mg daily.  Continue glipizide for now once daily.  Continue with daily BS.  Return in 10 weeks for A1c update.  Referral to podiatry for foot care.  Monitor and follow.   - REFERRAL TO PODIATRY    3. Right lower quadrant " abdominal pain  Chronic, ongoing, followed by GI consultants.  Has upcoming US and CT scan.  No blood in stool.  Monitor.    4. Gastroesophageal reflux disease with esophagitis  Chronic, ongoing. Improving with smaller meals and 40 mg bid omeprazole.  Refill today.  Followed by GI.     5. Gastroparesis  New problem to me. Chronic, ongoing. Followed by GI consultants. Continue with small frequent meals.  Monitor     6. SK (seborrheic keratosis)  New problem.  SK on right calf.  CRYOTHERAPY:  Discussed risks and benefits of cryotherapy. Patient verbally agreed. 3 applications of cryotherapy were applied to one lesion on right calf. Patient tolerated procedure well. Aftercare instructions given.      7. Acute cystitis with hematuria  New problem, recently seen in UC.  Finish antibiotics as prescribed.  Recheck urine for resolution in 10 days.  Order provided. Monitor and follow.   - UA/M W/RFLX CULTURE, ROUTINE    8. Primary insomnia  Chronic, ongoing. Worsening.  Advised to stop benadryl.  Patient can try over the counter sleep aid from [employer] or  [employer] to see if this helps.  Monitor and follow.       Followup: Return in about 10 weeks (around 5/20/2020) for A1c.         Educated in proper administration of medication(s) ordered today including safety, possible SE, risks, benefits, rationale and alternatives to therapy.     Please note that this dictation was created using voice recognition software. I have made every reasonable attempt to correct obvious errors, but I expect that there are errors of grammar and possibly content that I did not discover before finalizing the note.

## 2020-03-12 NOTE — ASSESSMENT & PLAN NOTE
Follow up on this issue, seen in UC.  Currently on ABX.  Symptoms have improved.  Will recheck Urine in two weeks for clearance.  Lab order and cup provided.

## 2020-03-12 NOTE — ASSESSMENT & PLAN NOTE
Reports nightly tylenol pm.  Reports hang over throughout the day.  Advised to stop the benadryl product due to age and symptoms.

## 2020-03-23 ENCOUNTER — HOSPITAL ENCOUNTER (OUTPATIENT)
Dept: LAB | Facility: MEDICAL CENTER | Age: 80
End: 2020-03-23
Attending: NURSE PRACTITIONER
Payer: MEDICARE

## 2020-03-24 ENCOUNTER — TELEPHONE (OUTPATIENT)
Dept: MEDICAL GROUP | Facility: PHYSICIAN GROUP | Age: 80
End: 2020-03-24

## 2020-03-24 ENCOUNTER — HOSPITAL ENCOUNTER (OUTPATIENT)
Facility: MEDICAL CENTER | Age: 80
End: 2020-03-24
Attending: NURSE PRACTITIONER
Payer: MEDICARE

## 2020-03-24 LAB
APPEARANCE UR: CLEAR
BILIRUB UR QL STRIP.AUTO: NEGATIVE
COLOR UR: YELLOW
GLUCOSE UR STRIP.AUTO-MCNC: >=1000 MG/DL
KETONES UR STRIP.AUTO-MCNC: NEGATIVE MG/DL
LEUKOCYTE ESTERASE UR QL STRIP.AUTO: NEGATIVE
MICRO URNS: ABNORMAL
NITRITE UR QL STRIP.AUTO: NEGATIVE
PH UR STRIP.AUTO: 5.5 [PH] (ref 5–8)
PROT UR QL STRIP: NEGATIVE MG/DL
RBC UR QL AUTO: NEGATIVE
SP GR UR STRIP.AUTO: 1.04
UROBILINOGEN UR STRIP.AUTO-MCNC: 0.2 MG/DL

## 2020-03-24 PROCEDURE — 81003 URINALYSIS AUTO W/O SCOPE: CPT

## 2020-03-24 NOTE — TELEPHONE ENCOUNTER
----- Message from RANDY Barfield sent at 3/24/2020  1:38 PM PDT -----  Brennon  Urinalysis is back.  There is no evidence of bacteria or blood in your urine.  This is good news.  RANDY Barfield

## 2020-03-24 NOTE — TELEPHONE ENCOUNTER
Phone Number Called: [phone] (home)     Call outcome: Did not leave a detailed message. Requested patient to call back.    Message: urine results

## 2020-04-13 DIAGNOSIS — L98.9 SKIN LESION OF FACE: ICD-10-CM

## 2020-04-13 RX ORDER — ACYCLOVIR 200 MG/1
CAPSULE ORAL
Qty: 90 CAP | Refills: 4 | Status: SHIPPED | OUTPATIENT
Start: 2020-04-13 | End: 2021-06-16

## 2020-04-13 RX ORDER — LEVOTHYROXINE SODIUM 0.07 MG/1
TABLET ORAL
Qty: 90 TAB | Refills: 4 | Status: SHIPPED | OUTPATIENT
Start: 2020-04-13 | End: 2021-06-16

## 2020-04-13 NOTE — TELEPHONE ENCOUNTER
Received request via: Pharmacy    Was the patient seen in the last year in this department? Yes    Does the patient have an active prescription (recently filled or refills available) for medication(s) requested? Yes different pharmacy requesting

## 2020-04-13 NOTE — TELEPHONE ENCOUNTER
Requested Prescriptions     Signed Prescriptions Disp Refills   • levothyroxine (SYNTHROID) 75 MCG Tab 90 Tab 4     Sig: TAKE 1 TABLET EVERY MORNING ON AN EMPTY STOMACH     Authorizing Provider: PRATEEK HART   • acyclovir (ZOVIRAX) 200 MG Cap 90 Cap 4     Sig: TAKE 1 CAPSULE EVERY DAY     Authorizing Provider: PRATEEK HART A.P.R.N.

## 2020-04-18 DIAGNOSIS — I10 ESSENTIAL HYPERTENSION: ICD-10-CM

## 2020-04-20 RX ORDER — AMLODIPINE BESYLATE 5 MG/1
TABLET ORAL
Qty: 90 TAB | Refills: 3 | Status: SHIPPED
Start: 2020-04-20 | End: 2020-09-09

## 2020-04-20 NOTE — TELEPHONE ENCOUNTER
Requested Prescriptions     Signed Prescriptions Disp Refills   • amLODIPine (NORVASC) 5 MG Tab 90 Tab 3     Sig: TAKE 1 TABLET BY MOUTH EVERY DAY     Authorizing Provider: PRATEEK HART A.P.R.N.

## 2020-09-09 DIAGNOSIS — I10 ESSENTIAL HYPERTENSION: ICD-10-CM

## 2020-09-09 RX ORDER — AMLODIPINE BESYLATE 5 MG/1
TABLET ORAL
Qty: 90 TAB | Refills: 3 | Status: ON HOLD | OUTPATIENT
Start: 2020-09-09 | End: 2020-10-08

## 2020-09-09 NOTE — TELEPHONE ENCOUNTER
Requested Prescriptions     Signed Prescriptions Disp Refills   • amLODIPine (NORVASC) 5 MG Tab 90 Tab 3     Sig: TAKE 1 TABLET EVERY DAY     Authorizing Provider: PRATEEK HART A.P.R.N.

## 2020-09-09 NOTE — TELEPHONE ENCOUNTER
Received request via: Pharmacy    Was the patient seen in the last year in this department? Yes    Does the patient have an active prescription (recently filled or refills available) for medication(s) requested? YES AT LOCAL BUT MAIL ORDER IS REQUESTING

## 2020-10-05 ENCOUNTER — APPOINTMENT (OUTPATIENT)
Dept: RADIOLOGY | Facility: MEDICAL CENTER | Age: 80
DRG: 038 | End: 2020-10-05
Attending: EMERGENCY MEDICINE
Payer: MEDICARE

## 2020-10-05 ENCOUNTER — HOSPITAL ENCOUNTER (INPATIENT)
Facility: MEDICAL CENTER | Age: 80
LOS: 2 days | DRG: 038 | End: 2020-10-08
Attending: EMERGENCY MEDICINE | Admitting: INTERNAL MEDICINE
Payer: MEDICARE

## 2020-10-05 DIAGNOSIS — I10 ESSENTIAL HYPERTENSION: ICD-10-CM

## 2020-10-05 DIAGNOSIS — I65.22 LEFT CAROTID STENOSIS: ICD-10-CM

## 2020-10-05 DIAGNOSIS — I63.6 CEREBROVASCULAR ACCIDENT (CVA) DUE TO NONPYOGENIC CEREBRAL VENOUS THROMBOSIS (HCC): ICD-10-CM

## 2020-10-05 DIAGNOSIS — E11.65 TYPE 2 DIABETES MELLITUS WITH HYPERGLYCEMIA, WITHOUT LONG-TERM CURRENT USE OF INSULIN (HCC): ICD-10-CM

## 2020-10-05 DIAGNOSIS — G45.9 TIA (TRANSIENT ISCHEMIC ATTACK): ICD-10-CM

## 2020-10-05 DIAGNOSIS — I63.9 CEREBROVASCULAR ACCIDENT (CVA), UNSPECIFIED MECHANISM (HCC): ICD-10-CM

## 2020-10-05 LAB
ABO + RH BLD: NORMAL
ABO GROUP BLD: NORMAL
ALBUMIN SERPL BCP-MCNC: 4.2 G/DL (ref 3.2–4.9)
ALBUMIN/GLOB SERPL: 1.4 G/DL
ALP SERPL-CCNC: 129 U/L (ref 30–99)
ALT SERPL-CCNC: 33 U/L (ref 2–50)
ANION GAP SERPL CALC-SCNC: 11 MMOL/L (ref 7–16)
APTT PPP: 30.4 SEC (ref 24.7–36)
AST SERPL-CCNC: 23 U/L (ref 12–45)
BASOPHILS # BLD AUTO: 0.4 % (ref 0–1.8)
BASOPHILS # BLD: 0.02 K/UL (ref 0–0.12)
BILIRUB SERPL-MCNC: 0.4 MG/DL (ref 0.1–1.5)
BLD GP AB SCN SERPL QL: NORMAL
BUN SERPL-MCNC: 13 MG/DL (ref 8–22)
CALCIUM SERPL-MCNC: 9.1 MG/DL (ref 8.5–10.5)
CHLORIDE SERPL-SCNC: 98 MMOL/L (ref 96–112)
CO2 SERPL-SCNC: 24 MMOL/L (ref 20–33)
CREAT SERPL-MCNC: 0.75 MG/DL (ref 0.5–1.4)
EKG IMPRESSION: NORMAL
EOSINOPHIL # BLD AUTO: 0.15 K/UL (ref 0–0.51)
EOSINOPHIL NFR BLD: 2.7 % (ref 0–6.9)
ERYTHROCYTE [DISTWIDTH] IN BLOOD BY AUTOMATED COUNT: 47.4 FL (ref 35.9–50)
EST. AVERAGE GLUCOSE BLD GHB EST-MCNC: 298 MG/DL
GLOBULIN SER CALC-MCNC: 2.9 G/DL (ref 1.9–3.5)
GLUCOSE BLD-MCNC: 275 MG/DL (ref 65–99)
GLUCOSE BLD-MCNC: 294 MG/DL (ref 65–99)
GLUCOSE SERPL-MCNC: 324 MG/DL (ref 65–99)
HBA1C MFR BLD: 12 % (ref 0–5.6)
HCT VFR BLD AUTO: 47.2 % (ref 37–47)
HGB BLD-MCNC: 16.1 G/DL (ref 12–16)
IMM GRANULOCYTES # BLD AUTO: 0.01 K/UL (ref 0–0.11)
IMM GRANULOCYTES NFR BLD AUTO: 0.2 % (ref 0–0.9)
INR PPP: 0.95 (ref 0.87–1.13)
LYMPHOCYTES # BLD AUTO: 2.28 K/UL (ref 1–4.8)
LYMPHOCYTES NFR BLD: 40.6 % (ref 22–41)
MAGNESIUM SERPL-MCNC: 1.8 MG/DL (ref 1.5–2.5)
MCH RBC QN AUTO: 35.1 PG (ref 27–33)
MCHC RBC AUTO-ENTMCNC: 34.1 G/DL (ref 33.6–35)
MCV RBC AUTO: 102.8 FL (ref 81.4–97.8)
MONOCYTES # BLD AUTO: 0.52 K/UL (ref 0–0.85)
MONOCYTES NFR BLD AUTO: 9.3 % (ref 0–13.4)
NEUTROPHILS # BLD AUTO: 2.63 K/UL (ref 2–7.15)
NEUTROPHILS NFR BLD: 46.8 % (ref 44–72)
NRBC # BLD AUTO: 0 K/UL
NRBC BLD-RTO: 0 /100 WBC
PLATELET # BLD AUTO: 224 K/UL (ref 164–446)
PMV BLD AUTO: 11.4 FL (ref 9–12.9)
POTASSIUM SERPL-SCNC: 3.9 MMOL/L (ref 3.6–5.5)
PROT SERPL-MCNC: 7.1 G/DL (ref 6–8.2)
PROTHROMBIN TIME: 13 SEC (ref 12–14.6)
RBC # BLD AUTO: 4.59 M/UL (ref 4.2–5.4)
RH BLD: NORMAL
SODIUM SERPL-SCNC: 133 MMOL/L (ref 135–145)
TROPONIN T SERPL-MCNC: 10 NG/L (ref 6–19)
WBC # BLD AUTO: 5.6 K/UL (ref 4.8–10.8)

## 2020-10-05 PROCEDURE — 86850 RBC ANTIBODY SCREEN: CPT

## 2020-10-05 PROCEDURE — 99220 PR INITIAL OBSERVATION CARE,LEVL III: CPT | Performed by: INTERNAL MEDICINE

## 2020-10-05 PROCEDURE — 70496 CT ANGIOGRAPHY HEAD: CPT

## 2020-10-05 PROCEDURE — C9803 HOPD COVID-19 SPEC COLLECT: HCPCS | Performed by: INTERNAL MEDICINE

## 2020-10-05 PROCEDURE — 85025 COMPLETE CBC W/AUTO DIFF WBC: CPT

## 2020-10-05 PROCEDURE — 92610 EVALUATE SWALLOWING FUNCTION: CPT

## 2020-10-05 PROCEDURE — 700117 HCHG RX CONTRAST REV CODE 255: Performed by: EMERGENCY MEDICINE

## 2020-10-05 PROCEDURE — 96374 THER/PROPH/DIAG INJ IV PUSH: CPT

## 2020-10-05 PROCEDURE — 96372 THER/PROPH/DIAG INJ SC/IM: CPT

## 2020-10-05 PROCEDURE — 700102 HCHG RX REV CODE 250 W/ 637 OVERRIDE(OP): Performed by: NURSE PRACTITIONER

## 2020-10-05 PROCEDURE — 700111 HCHG RX REV CODE 636 W/ 250 OVERRIDE (IP): Performed by: EMERGENCY MEDICINE

## 2020-10-05 PROCEDURE — 99285 EMERGENCY DEPT VISIT HI MDM: CPT

## 2020-10-05 PROCEDURE — 85730 THROMBOPLASTIN TIME PARTIAL: CPT

## 2020-10-05 PROCEDURE — G0378 HOSPITAL OBSERVATION PER HR: HCPCS

## 2020-10-05 PROCEDURE — 70450 CT HEAD/BRAIN W/O DYE: CPT

## 2020-10-05 PROCEDURE — 99204 OFFICE O/P NEW MOD 45 MIN: CPT | Performed by: PSYCHIATRY & NEUROLOGY

## 2020-10-05 PROCEDURE — A9270 NON-COVERED ITEM OR SERVICE: HCPCS | Performed by: INTERNAL MEDICINE

## 2020-10-05 PROCEDURE — 70498 CT ANGIOGRAPHY NECK: CPT

## 2020-10-05 PROCEDURE — 80053 COMPREHEN METABOLIC PANEL: CPT

## 2020-10-05 PROCEDURE — 85610 PROTHROMBIN TIME: CPT

## 2020-10-05 PROCEDURE — 86901 BLOOD TYPING SEROLOGIC RH(D): CPT

## 2020-10-05 PROCEDURE — 83036 HEMOGLOBIN GLYCOSYLATED A1C: CPT

## 2020-10-05 PROCEDURE — 84484 ASSAY OF TROPONIN QUANT: CPT

## 2020-10-05 PROCEDURE — 82962 GLUCOSE BLOOD TEST: CPT

## 2020-10-05 PROCEDURE — 86900 BLOOD TYPING SEROLOGIC ABO: CPT

## 2020-10-05 PROCEDURE — 700102 HCHG RX REV CODE 250 W/ 637 OVERRIDE(OP): Performed by: INTERNAL MEDICINE

## 2020-10-05 PROCEDURE — 83735 ASSAY OF MAGNESIUM: CPT

## 2020-10-05 PROCEDURE — 71045 X-RAY EXAM CHEST 1 VIEW: CPT

## 2020-10-05 PROCEDURE — A9270 NON-COVERED ITEM OR SERVICE: HCPCS | Performed by: NURSE PRACTITIONER

## 2020-10-05 PROCEDURE — 93005 ELECTROCARDIOGRAM TRACING: CPT | Performed by: EMERGENCY MEDICINE

## 2020-10-05 RX ORDER — POLYETHYLENE GLYCOL 3350 17 G/17G
1 POWDER, FOR SOLUTION ORAL
Status: DISCONTINUED | OUTPATIENT
Start: 2020-10-05 | End: 2020-10-08 | Stop reason: HOSPADM

## 2020-10-05 RX ORDER — CLOPIDOGREL BISULFATE 75 MG/1
75 TABLET ORAL DAILY
Status: DISCONTINUED | OUTPATIENT
Start: 2020-10-06 | End: 2020-10-08 | Stop reason: HOSPADM

## 2020-10-05 RX ORDER — ONDANSETRON 2 MG/ML
4 INJECTION INTRAMUSCULAR; INTRAVENOUS ONCE
Status: COMPLETED | OUTPATIENT
Start: 2020-10-05 | End: 2020-10-05

## 2020-10-05 RX ORDER — DOCUSATE SODIUM 100 MG/1
100 CAPSULE, LIQUID FILLED ORAL 2 TIMES DAILY
COMMUNITY

## 2020-10-05 RX ORDER — DEXTROSE MONOHYDRATE 25 G/50ML
50 INJECTION, SOLUTION INTRAVENOUS
Status: DISCONTINUED | OUTPATIENT
Start: 2020-10-05 | End: 2020-10-08 | Stop reason: HOSPADM

## 2020-10-05 RX ORDER — LEVOTHYROXINE SODIUM 0.03 MG/1
75 TABLET ORAL
Status: DISCONTINUED | OUTPATIENT
Start: 2020-10-06 | End: 2020-10-08 | Stop reason: HOSPADM

## 2020-10-05 RX ORDER — ASPIRIN 325 MG
325 TABLET ORAL DAILY
Status: DISCONTINUED | OUTPATIENT
Start: 2020-10-05 | End: 2020-10-05

## 2020-10-05 RX ORDER — ASPIRIN 300 MG/1
300 SUPPOSITORY RECTAL DAILY
Status: DISCONTINUED | OUTPATIENT
Start: 2020-10-05 | End: 2020-10-05

## 2020-10-05 RX ORDER — OMEPRAZOLE 20 MG/1
40 CAPSULE, DELAYED RELEASE ORAL 2 TIMES DAILY
Status: DISCONTINUED | OUTPATIENT
Start: 2020-10-05 | End: 2020-10-08 | Stop reason: HOSPADM

## 2020-10-05 RX ORDER — DIPHENHYDRAMINE HCL 25 MG
25 TABLET ORAL NIGHTLY PRN
Status: DISCONTINUED | OUTPATIENT
Start: 2020-10-05 | End: 2020-10-08 | Stop reason: HOSPADM

## 2020-10-05 RX ORDER — ACETAMINOPHEN 325 MG/1
650 TABLET ORAL EVERY 6 HOURS PRN
Status: DISCONTINUED | OUTPATIENT
Start: 2020-10-05 | End: 2020-10-08 | Stop reason: HOSPADM

## 2020-10-05 RX ORDER — ONDANSETRON 2 MG/ML
4 INJECTION INTRAMUSCULAR; INTRAVENOUS EVERY 4 HOURS PRN
Status: DISCONTINUED | OUTPATIENT
Start: 2020-10-05 | End: 2020-10-08 | Stop reason: HOSPADM

## 2020-10-05 RX ORDER — ONDANSETRON 4 MG/1
4 TABLET, ORALLY DISINTEGRATING ORAL EVERY 4 HOURS PRN
Status: DISCONTINUED | OUTPATIENT
Start: 2020-10-05 | End: 2020-10-08 | Stop reason: HOSPADM

## 2020-10-05 RX ORDER — ATORVASTATIN CALCIUM 40 MG/1
40 TABLET, FILM COATED ORAL EVERY EVENING
Status: DISCONTINUED | OUTPATIENT
Start: 2020-10-05 | End: 2020-10-08 | Stop reason: HOSPADM

## 2020-10-05 RX ORDER — ASPIRIN 81 MG/1
324 TABLET, CHEWABLE ORAL DAILY
Status: DISCONTINUED | OUTPATIENT
Start: 2020-10-05 | End: 2020-10-05

## 2020-10-05 RX ORDER — BISACODYL 10 MG
10 SUPPOSITORY, RECTAL RECTAL
Status: DISCONTINUED | OUTPATIENT
Start: 2020-10-05 | End: 2020-10-08 | Stop reason: HOSPADM

## 2020-10-05 RX ORDER — AMOXICILLIN 250 MG
2 CAPSULE ORAL 2 TIMES DAILY
Status: DISCONTINUED | OUTPATIENT
Start: 2020-10-05 | End: 2020-10-08 | Stop reason: HOSPADM

## 2020-10-05 RX ORDER — POLYETHYLENE GLYCOL 3350 17 G/17G
17 POWDER, FOR SOLUTION ORAL DAILY
COMMUNITY

## 2020-10-05 RX ADMIN — INSULIN LISPRO 3 UNITS: 100 INJECTION, SOLUTION INTRAVENOUS; SUBCUTANEOUS at 22:42

## 2020-10-05 RX ADMIN — IOHEXOL 80 ML: 350 INJECTION, SOLUTION INTRAVENOUS at 09:00

## 2020-10-05 RX ADMIN — OMEPRAZOLE 40 MG: 20 CAPSULE, DELAYED RELEASE ORAL at 17:46

## 2020-10-05 RX ADMIN — ACETAMINOPHEN 650 MG: 325 TABLET, FILM COATED ORAL at 23:32

## 2020-10-05 RX ADMIN — DIPHENHYDRAMINE HYDROCHLORIDE 25 MG: 25 TABLET ORAL at 23:33

## 2020-10-05 RX ADMIN — ATORVASTATIN CALCIUM 40 MG: 40 TABLET, FILM COATED ORAL at 17:46

## 2020-10-05 RX ADMIN — ONDANSETRON 4 MG: 2 INJECTION INTRAMUSCULAR; INTRAVENOUS at 08:38

## 2020-10-05 ASSESSMENT — COGNITIVE AND FUNCTIONAL STATUS - GENERAL
SUGGESTED CMS G CODE MODIFIER MOBILITY: CH
SUGGESTED CMS G CODE MODIFIER MOBILITY: CH
MOBILITY SCORE: 24
DAILY ACTIVITIY SCORE: 24
SUGGESTED CMS G CODE MODIFIER DAILY ACTIVITY: CH
MOBILITY SCORE: 24

## 2020-10-05 ASSESSMENT — LIFESTYLE VARIABLES
TOTAL SCORE: 0
AVERAGE NUMBER OF DAYS PER WEEK YOU HAVE A DRINK CONTAINING ALCOHOL: 0
EVER FELT BAD OR GUILTY ABOUT YOUR DRINKING: NO
HAVE PEOPLE ANNOYED YOU BY CRITICIZING YOUR DRINKING: NO
CONSUMPTION TOTAL: NEGATIVE
DOES PATIENT WANT TO STOP DRINKING: NO
HOW MANY TIMES IN THE PAST YEAR HAVE YOU HAD 5 OR MORE DRINKS IN A DAY: 0
TOTAL SCORE: 0
ON A TYPICAL DAY WHEN YOU DRINK ALCOHOL HOW MANY DRINKS DO YOU HAVE: 0
TOTAL SCORE: 0
HAVE YOU EVER FELT YOU SHOULD CUT DOWN ON YOUR DRINKING: NO
EVER HAD A DRINK FIRST THING IN THE MORNING TO STEADY YOUR NERVES TO GET RID OF A HANGOVER: NO
ALCOHOL_USE: NO

## 2020-10-05 ASSESSMENT — PATIENT HEALTH QUESTIONNAIRE - PHQ9
2. FEELING DOWN, DEPRESSED, IRRITABLE, OR HOPELESS: NOT AT ALL
2. FEELING DOWN, DEPRESSED, IRRITABLE, OR HOPELESS: NOT AT ALL
1. LITTLE INTEREST OR PLEASURE IN DOING THINGS: NOT AT ALL
SUM OF ALL RESPONSES TO PHQ9 QUESTIONS 1 AND 2: 0
1. LITTLE INTEREST OR PLEASURE IN DOING THINGS: NOT AT ALL
SUM OF ALL RESPONSES TO PHQ9 QUESTIONS 1 AND 2: 0

## 2020-10-05 ASSESSMENT — FIBROSIS 4 INDEX: FIB4 SCORE: 1.39

## 2020-10-05 ASSESSMENT — PAIN DESCRIPTION - PAIN TYPE: TYPE: ACUTE PAIN

## 2020-10-05 NOTE — PROGRESS NOTES
Triage office note    80-year-old female presented to the hospital with complaint of right-sided weakness started this morning.  Neurology evaluated her she is not a candidate for TPA.  Per ERP her symptoms has been improving.      CTA neck showed approximately 75% narrowing at the origin of the left internal carotid artery.  Due to these findings I requested ER physician Dr. Forman consult vascular surgery.    I requested hospitalist Dr. Jolley to evaluate this patient for admission.      DX-CHEST-PORTABLE (1 VIEW)   Final Result      Mild hypoinflation without other evidence for acute cardiopulmonary disease.      CT-CTA NECK WITH & W/O-POST PROCESSING   Final Result      1.  Approximately 75% narrowing at the origin of the left internal carotid artery.      2.  Approximately 25% focal stenosis at the origin of the right internal carotid artery.      3.  Patent bilateral vertebral arteries.      4.  Minimal narrowing at the origin of the left subclavian artery.      CT-CTA HEAD WITH & W/O-POST PROCESS   Final Result      CT angiogram of the [place] within normal limits.      CT-HEAD W/O   Final Result      1.  No acute intracranial process.      2.  Atrophy and small vessel ischemic change.

## 2020-10-05 NOTE — CONSULTS
Neurology STROKE CODE H&P  Neurohospitalist Service, [facility]    Referring Physician: Senait Forman M.D.    STROKE CODE:   Chief Complaint   Patient presents with   • Possible Stroke       To obtain the most accurate data regarding the time called, and time patient seen, refer to the stroke run-sheet and chart.  For time of CT, refer to the radiology report. See A&P below for TPA Decision and door to needle time if and when applicable.    HPI: Bailey White is a 80 y.o. woman presenting for whom neurology has been consulted for isolated speech abnormality.  The patient was LKN 7am 10/5/20.  Found by mother at 7:45A with slurring of speech and garbled nonsensical speech, thus [facility] was called.  Patient was described by [facility] to have right arm weakness which has seemingly resolved upon arrival to [facility].  Patient's speech problem also seems to wax and wane.  Denies headache, diplopia, nor numbness.  Patient hypertensive to 190s systolic.    Review of systems: In addition to what is detailed in the HPI above, all other systems reviewed and are negative.  Denies vertigo nor lightheadedness.  Admits SOB.    Past Medical History:    has a past medical history of Anesthesia, Arthritis, Blood transfusion, Bronchitis (2011), Carotid stenosis, CATARACT, CVA (cerebral vascular accident) (HCC), Diabetes, Fibromyalgia, Fibromyalgia, GERD (gastroesophageal reflux disease), Heart burn, Herpes genital, Hiatus hernia syndrome, HTN, transient ischemic attack (TIA), Indigestion, Insomnia, Migraine, Pain, Seizure (HCC), Thyroid disease, TIA, Unspecified hemorrhagic conditions, Unspecified urinary incontinence, and Urinary bladder disorder. She also has no past medical history of ASTHMA or Breast cancer ([facility]).    FHx:  family history includes Cancer in her maternal grandmother; Heart Disease in her father.    SHx:   reports that she quit smoking about 65 years ago. Her smoking use included  cigarettes. She has a 3.00 pack-year smoking history. She has never used smokeless tobacco. She reports that she does not drink alcohol or use drugs.    Allergies:  Allergies   Allergen Reactions   • Asa [Aspirin]      GI bleeding   • Ciprofloxacin Rash   • Clindamycin Hives   • Codeine Vomiting   • Food Anxiety     Yellow dye, peanuts and peanut products, all peppers,tomato, potato,wheat, gluten   • Hydrocortisone      Suicidal after Cortisone injection   • Ibuprofen      Abdominal pain   • Imitrex [Sumatriptan Succinate] Vomiting   • Inderal [Propranolol Hcl]      Not sure of reaction   • Lyrica      suicidal   • Mellaril      Psychotic episode   • Other Drug      All mycins,cillins,and cyclines   • Pcn [Penicillins] Hives   • Ropinirole Hcl [Requip] Shortness of Breath   • Shellfish Allergy      headaches   • Sulfa Drugs Itching   • Tagamet [Cimetidine]      Mental confusion       Medications:  No current facility-administered medications for this encounter.     Current Outpatient Medications:   •  polyethylene glycol/lytes (MIRALAX) 17 g Pack, Take 17 g by mouth every day., Disp: , Rfl:   •  docusate sodium (COLACE) 100 MG Cap, Take 100 mg by mouth 2 times a day., Disp: , Rfl:   •  Omega-3 Fatty Acids (OMEGA-3 PO), Take 1 Cap by mouth every day., Disp: , Rfl:   •  amLODIPine (NORVASC) 5 MG Tab, TAKE 1 TABLET EVERY DAY, Disp: 90 Tab, Rfl: 3  •  atorvastatin (LIPITOR) 20 MG Tab, Take 1 Tab by mouth every day., Disp: 90 Tab, Rfl: 3  •  clopidogrel (PLAVIX) 75 MG Tab, Take 1 Tab by mouth every day., Disp: 90 Tab, Rfl: 3  •  glipiZIDE SR (GLUCOTROL) 5 MG TABLET SR 24 HR, Take 1 Tab by mouth every day., Disp: 90 Tab, Rfl: 3  •  levothyroxine (SYNTHROID) 75 MCG Tab, TAKE 1 TABLET EVERY MORNING ON AN EMPTY STOMACH, Disp: 90 Tab, Rfl: 4  •  acyclovir (ZOVIRAX) 200 MG Cap, TAKE 1 CAPSULE EVERY DAY, Disp: 90 Cap, Rfl: 4  •  omeprazole (PRILOSEC) 40 MG delayed-release capsule, Take 1 Cap by mouth 2 times a day., Disp: 180  "Cap, Rfl: 3  •  Coenzyme Q10 (CO Q 10 PO), Take 1 Tab by mouth every day., Disp: , Rfl:   •  Cranberry 200 MG Cap, Take 1 Cap by mouth 2 Times a Day., Disp: , Rfl:   •  Magnesium Gluconate (MAGNESIUM 27 PO), Take 1 Tab by mouth every day., Disp: , Rfl:   •  Cholecalciferol (VITAMIN D) 2000 UNITS CAPS, Take 2,000 Units by mouth every day., Disp: , Rfl:     Physical Examination:    Vitals:    10/05/20 0839 10/05/20 0840 10/05/20 0843 10/05/20 0855   BP:  (!) 198/108 (!) 196/88    Pulse:  88 92 96   Resp:  16 (!) 32 20   Temp:  36.8 °C (98.3 °F)     TempSrc:  Temporal     SpO2:  96% 94% 96%   Weight: 79.4 kg (175 lb)      Height: 1.6 m (5' 3\")          General: Patient is awake and in no acute distress  Eyes: examination of optic disks not indicated at this time given acuity of consult  CV: RRR    NEUROLOGICAL EXAM:     Mental status: Awake, alert and fully oriented, follows commands  Speech and language: speech is not dysarthric. The patient is able to name and repeat with repeated stimulation and encouragement admixed with distractible nonsensical garbled neologisms  Cranial nerve exam: Pupils are equal, round and reactive to light bilaterally. Visual fields are full on blink to threat b/l. Extraocular muscles are intact. Sensation in the face is intact to light touch. Face is symmetric. Hearing to finger rub equal. Palate elevates symmetrically. Shoulder shrug is full. Tongue is midline.  Motor exam: Nonparticipatory with formal strength testing but at least antgravity x4 extremities with give way weakness.  + functional arm drop test RUE. Tone is normal. No abnormal movements were seen on exam.  Sensory exam: No sensory deficits identified, hyperalgesia  Deep tendon reflexes: 2+ and symmetric. Toes down-going bilaterally.  Coordination: no ataxia   Gait: deferred given patient preference    NIH Stroke Scale:    1a. Level of Consciousness (Alert, drowsy, etc): 0= Alert    1b. LOC Questions (Month, age): 0= Answers " both correctly    1c. LOC Commands (Open/close eyes make fist/let go): 0= Obeys both correctly    2.   Best Gaze (Eyes open - patient follows examiner's finger on face): 0= Normal    3.   Visual Fields (introduce visual stimulus/threat to patient's field quadrants): 0= No visual loss  4.   Facial Paresis (Show teeth, raise eyebrows and squeeze eyes shut): 0= Normal     5a. Motor Arm - Left (Elevate arm to 90 degrees if patient is sitting, 45 degrees if  supine): 0= No drift    5b. Motor Arm - Right (Elevate arm to 90 degrees if patient is sitting, 45 degrees if supine): 0= No drift    6a. Motor Leg - Left (Elevate leg 30 degrees with patient supine): 0= No drift    6b. Motor Leg - Right  (Elevate leg 30 degrees with patient supine): 0= No drift    7.   Limb Ataxia (Finger-nose, heel down shin): 0= No ataxia    8.   Sensory (Pin prick to face, arm, trunk and leg - compare side to side): 0= Normal    9.  Best Language (Name item, describe a picture and read sentences): 0= No aphasia    10. Dysarthria (Evaluate speech clarity by patient repeating listed words): 0= Normal articulation    11. Extinction and Inattention (Use information from prior testing to identify neglect or  double simultaneous stimuli testing): 0= No neglect    Total NIH Score: 0    Modified [place] Scale (MRS): 3 = Moderate disability; requires some help, but able to walk without assistance    Objective Data:    Labs:  Lab Results   Component Value Date/Time    PROTHROMBTM 13.0 10/05/2020 08:15 AM    INR 0.95 10/05/2020 08:15 AM      Lab Results   Component Value Date/Time    WBC 5.6 10/05/2020 08:15 AM    RBC 4.59 10/05/2020 08:15 AM    HEMOGLOBIN 16.1 (H) 10/05/2020 08:15 AM    HEMATOCRIT 47.2 (H) 10/05/2020 08:15 AM    .8 (H) 10/05/2020 08:15 AM    MCH 35.1 (H) 10/05/2020 08:15 AM    MCHC 34.1 10/05/2020 08:15 AM    MPV 11.4 10/05/2020 08:15 AM    NEUTSPOLYS 46.80 10/05/2020 08:15 AM    LYMPHOCYTES 40.60 10/05/2020 08:15 AM    MONOCYTES  9.30 10/05/2020 08:15 AM    EOSINOPHILS 2.70 10/05/2020 08:15 AM    BASOPHILS 0.40 10/05/2020 08:15 AM      Lab Results   Component Value Date/Time    SODIUM 137 02/13/2020 09:47 AM    POTASSIUM 4.3 02/13/2020 09:47 AM    CHLORIDE 104 02/13/2020 09:47 AM    CO2 26 02/13/2020 09:47 AM    GLUCOSE 247 (H) 02/13/2020 09:47 AM    BUN 21 02/13/2020 09:47 AM    CREATININE 0.96 02/13/2020 09:47 AM    CREATININE 1.0 03/06/2009 09:50 AM      Lab Results   Component Value Date/Time    CHOLSTRLTOT 139 11/30/2019 07:49 AM    LDL 71 11/30/2019 07:49 AM    HDL 49 11/30/2019 07:49 AM    TRIGLYCERIDE 97 11/30/2019 07:49 AM       Lab Results   Component Value Date/Time    ALKPHOSPHAT 82 02/13/2020 09:47 AM    ASTSGOT 19 02/13/2020 09:47 AM    ALTSGPT 25 02/13/2020 09:47 AM    TBILIRUBIN 0.6 02/13/2020 09:47 AM        Imaging/Testing:    I interpreted and/or reviewed the patient's neuroimaging    CT-CTA NECK WITH & W/O-POST PROCESSING   Final Result      1.  Approximately 75% narrowing at the origin of the left internal carotid artery.      2.  Approximately 25% focal stenosis at the origin of the right internal carotid artery.      3.  Patent bilateral vertebral arteries.      4.  Minimal narrowing at the origin of the left subclavian artery.      CT-CTA HEAD WITH & W/O-POST PROCESS   Final Result      CT angiogram of the [place] within normal limits.      CT-HEAD W/O   Final Result      1.  No acute intracranial process.      2.  Atrophy and small vessel ischemic change.      DX-CHEST-PORTABLE (1 VIEW)    (Results Pending)       Assessment and Plan:    Bailey White is a 80 y.o. man presenting for whom neurology has been consulted for waxing and waning speech abnormality and transient right sided weakness.  The patient right arm weakness is function, with element of give way, and able to achieve at least one full second of 5/5 strength.  The speech pathology is nonconsistent with aphasia and the patient can both  name and repeat.  The patient's intermittent neologisms are distractible and atypical.  Ddx includes TIA vs. Hypertensive emergency.  Patient is not a candidate for tPA given improvement of weakness, NIHSS 0, and functional elements of examination.  The patient's atherosclerotic vessels direct further neuroimaging workup as the patient is at risk for ischemic events due to degree of large vessel disease burden.  Not a candidate for tPA given no LVO.    Plan:    - Admit to the hospital for further workup of etiology and to provide secondary stroke prevention measures  - Neurology checks and vital signs per protocol  - do not correct SBP more than 20-25% in the first 24hr; avoid hypoperfusion  - obtain normoglycemia and avoid hypo- or hyper -natremia; aim for normothermia  - secondary stroke prevention therapy with ASA 325mg qd  - high intensity statin per SPARCL Trial  - serum studies for stroke risk factors: lipid panel & hemoglobin A1C  - To identify evidence of stroke territory, obtain MRI brain  - evaluate and treat with PT/OT/ST    The evaluation of the patient, and recommended management, was discussed with Dr. Forman.    Terence Mcgrath MD  Neurohospitalist, Acute Care Services   of Neurology

## 2020-10-05 NOTE — ED NOTES
Patient is resting comfortably, sleeping with easy, unlabored respirations, chest rise symmetrical. Family departs bedside, call light within reach, remains on monitoring. Lights dimmed for comfort, will continue to monitor. HOB elevated.

## 2020-10-05 NOTE — ED NOTES
Patient resting on cart, wakes easily to verbal stim, pt alert, oriented x 4. Speech clear, answers questions appropriately. Patient updated on POC, verbalizes understanding, denies pain or complaints aside from BP cuff cycling. Assessment unchanged. Denies needs or questions, call light within reach, will continue to monitor. Patient remains on monitoring. Follows commands, denies N/T. Siderailsx2, HOB elevated, resting with easy, unlabored respirs in no distress. Lights dimmed, blankets in place. Neuro assessment otherwise unchanged.

## 2020-10-05 NOTE — CONSULTS
"        Date of Service  10/5/2020    Reason For Consult  Carotid stenosis, CVA    Requesting Physician  Senait Forman MD    Consulting Physician  Rene Verma M.D.    Primary Care Physician  JACE Barfield.    Chief Complaint  Headache    History of Present Illness  Pt has baseline dementia, confused, unable to relate any pertinent history. HPI obtained from review of records and speaking w/ ED.    Ms. White is an 80 y.o. female w/ dementia, still lives at home, who presented 10/5/2020 with right-sided weakness, aphasia, and headache. She has prior hx of stroke/TIA and is on plavix at baseline.     Workup suggested high-grade left carotid stenosis.    The patient is unable to provide any history, stating only that she has a headache and, \"wants to go home.\" She tells me she lives with her mother who is waiting for her.     Review of Systems  Review of Systems   Unable to perform ROS: Dementia       Past Medical History  Past Medical History:   Diagnosis Date   • Anesthesia     woke up during foot surgery   • Arthritis    • Blood transfusion    • Bronchitis 2011   • Carotid stenosis    • CATARACT     repaired   • CVA (cerebral vascular accident) (HCC)    • Diabetes     off metformin diet controlled   • Fibromyalgia    • Fibromyalgia    • GERD (gastroesophageal reflux disease)    • Heart burn    • Herpes genital    • Hiatus hernia syndrome    • HTN    • Hx of transient ischemic attack (TIA)    • Indigestion    • Insomnia    • Migraine    • Pain    • Seizure (HCC)     last seizure 2010   • Thyroid disease    • TIA     multiple   • Unspecified hemorrhagic conditions     on Plavix   • Unspecified urinary incontinence    • Urinary bladder disorder     incontinence       Surgical History  Past Surgical History:   Procedure Laterality Date   • HYSTEROSCOPY WITH VIDEO DIAGNOSTIC  9/5/2013    Performed by Portillo Barragan M.D. at SURGERY SAME DAY [facility] ORS   • DILATION AND CURETTAGE  9/5/2013    Performed " by Portillo Barragan M.D. at SURGERY SAME DAY [facility] ORS   • OTHER      cataracts   • OTHER ORTHOPEDIC SURGERY     • TONSILLECTOMY          Family History  Family History   Problem Relation Age of Onset   • Heart Disease Father    • Cancer Maternal Grandmother         Social History  Social History     Socioeconomic History   • Marital status: Single     Spouse name: Not on file   • Number of children: Not on file   • Years of education: Not on file   • Highest education level: Not on file   Occupational History   • Not on file   Social Needs   • Financial resource strain: Not on file   • Food insecurity     Worry: Not on file     Inability: Not on file   • Transportation needs     Medical: Not on file     Non-medical: Not on file   Tobacco Use   • Smoking status: Former Smoker     Packs/day: 3.00     Years: 1.00     Pack years: 3.00     Types: Cigarettes     Quit date: 1955     Years since quittin.8   • Smokeless tobacco: Never Used   Substance and Sexual Activity   • Alcohol use: No     Alcohol/week: 0.0 oz   • Drug use: No   • Sexual activity: Never   Lifestyle   • Physical activity     Days per week: Not on file     Minutes per session: Not on file   • Stress: Not on file   Relationships   • Social connections     Talks on phone: Not on file     Gets together: Not on file     Attends [employer] service: Not on file     Active member of club or organization: Not on file     Attends meetings of clubs or organizations: Not on file     Relationship status: Not on file   • Intimate partner violence     Fear of current or ex partner: Not on file     Emotionally abused: Not on file     Physically abused: Not on file     Forced sexual activity: Not on file   Other Topics Concern   • Not on file   Social History Narrative   • Not on file       Medications  Prior to Admission Medications   Prescriptions Last Dose Informant Patient Reported? Taking?   Cholecalciferol (VITAMIN D) 2000 UNITS CAPS 10/4/2020 at AM  Patient Yes No   Sig: Take 2,000 Units by mouth every day.   Coenzyme Q10 (CO Q 10 PO) 10/4/2020 at Unknown time Patient Yes No   Sig: Take 1 Tab by mouth every day.   Cranberry 200 MG Cap 10/4/2020 at Unknown time Patient Yes No   Sig: Take 1 Cap by mouth 2 Times a Day.   Magnesium Gluconate (MAGNESIUM 27 PO) 10/4/2020 at Unknown time Patient Yes No   Sig: Take 1 Tab by mouth every day.   Omega-3 Fatty Acids (OMEGA-3 PO) 10/4/2020 at Unknown time Patient Yes Yes   Sig: Take 1 Cap by mouth every day.   acyclovir (ZOVIRAX) 200 MG Cap 10/4/2020 at AM Patient No No   Sig: TAKE 1 CAPSULE EVERY DAY   amLODIPine (NORVASC) 5 MG Tab 10/4/2020 at AM Patient No No   Sig: TAKE 1 TABLET EVERY DAY   atorvastatin (LIPITOR) 20 MG Tab 10/4/2020 at AM Patient No No   Sig: Take 1 Tab by mouth every day.   clopidogrel (PLAVIX) 75 MG Tab 10/4/2020 at AM Patient No No   Sig: Take 1 Tab by mouth every day.   docusate sodium (COLACE) 100 MG Cap 10/4/2020 at Unknown time Patient Yes Yes   Sig: Take 100 mg by mouth 2 times a day.   glipiZIDE SR (GLUCOTROL) 5 MG TABLET SR 24 HR 10/4/2020 at AM Patient No No   Sig: Take 1 Tab by mouth every day.   levothyroxine (SYNTHROID) 75 MCG Tab 10/4/2020 at AM Patient No No   Sig: TAKE 1 TABLET EVERY MORNING ON AN EMPTY STOMACH   omeprazole (PRILOSEC) 40 MG delayed-release capsule 10/4/2020 at PM Patient No No   Sig: Take 1 Cap by mouth 2 times a day.   polyethylene glycol/lytes (MIRALAX) 17 g Pack 10/4/2020 at Unknown time Patient Yes Yes   Sig: Take 17 g by mouth every day.      Facility-Administered Medications: None       No current facility-administered medications for this encounter.      Current Outpatient Medications   Medication Sig Dispense Refill   • polyethylene glycol/lytes (MIRALAX) 17 g Pack Take 17 g by mouth every day.     • docusate sodium (COLACE) 100 MG Cap Take 100 mg by mouth 2 times a day.     • Omega-3 Fatty Acids (OMEGA-3 PO) Take 1 Cap by mouth every day.     • amLODIPine  (NORVASC) 5 MG Tab TAKE 1 TABLET EVERY DAY 90 Tab 3   • atorvastatin (LIPITOR) 20 MG Tab Take 1 Tab by mouth every day. 90 Tab 3   • clopidogrel (PLAVIX) 75 MG Tab Take 1 Tab by mouth every day. 90 Tab 3   • glipiZIDE SR (GLUCOTROL) 5 MG TABLET SR 24 HR Take 1 Tab by mouth every day. 90 Tab 3   • levothyroxine (SYNTHROID) 75 MCG Tab TAKE 1 TABLET EVERY MORNING ON AN EMPTY STOMACH 90 Tab 4   • acyclovir (ZOVIRAX) 200 MG Cap TAKE 1 CAPSULE EVERY DAY 90 Cap 4   • omeprazole (PRILOSEC) 40 MG delayed-release capsule Take 1 Cap by mouth 2 times a day. 180 Cap 3   • Coenzyme Q10 (CO Q 10 PO) Take 1 Tab by mouth every day.     • Cranberry 200 MG Cap Take 1 Cap by mouth 2 Times a Day.     • Magnesium Gluconate (MAGNESIUM 27 PO) Take 1 Tab by mouth every day.     • Cholecalciferol (VITAMIN D) 2000 UNITS CAPS Take 2,000 Units by mouth every day.         Allergies  Allergies   Allergen Reactions   • Asa [Aspirin]      GI bleeding   • Ciprofloxacin Rash   • Clindamycin Hives   • Codeine Vomiting   • Food Anxiety     Yellow dye, peanuts and peanut products, all peppers,tomato, potato,wheat, gluten   • Hydrocortisone      Suicidal after Cortisone injection   • Ibuprofen      Abdominal pain   • Imitrex [Sumatriptan Succinate] Vomiting   • Inderal [Propranolol Hcl]      Not sure of reaction   • Lyrica      suicidal   • Mellaril      Psychotic episode   • Other Drug      All mycins,cillins,and cyclines   • Pcn [Penicillins] Hives   • Ropinirole Hcl [Requip] Shortness of Breath   • Shellfish Allergy      headaches   • Sulfa Drugs Itching   • Tagamet [Cimetidine]      Mental confusion         Physical Exam  Temp:  [36.8 °C (98.3 °F)] 36.8 °C (98.3 °F)  Pulse:  [88-96] 90  Resp:  [16-32] 23  BP: (194-198)/() 194/99  SpO2:  [94 %-96 %] 95 %    General appearance: frail, confused  Psych: Flat affect, no insight into her current condition  Neuro: CN II-XII grossly intact. No obvious facial droop. Moving all extremities but  uncooperative w/ full exam.  Neck: full range of motion  Lungs: No inspiratory stridor or wheezing  CV: RRR  Abdomen: Soft, NT/ND  Skin: No rashes    Labs Reviewed Today:  Recent Labs     10/05/20  0815   WBC 5.6   RBC 4.59   HEMOGLOBIN 16.1*   HEMATOCRIT 47.2*   .8*   MCH 35.1*   MCHC 34.1   RDW 47.4   PLATELETCT 224   MPV 11.4     Recent Labs     10/05/20  0903   SODIUM 133*   POTASSIUM 3.9   CHLORIDE 98   CO2 24   GLUCOSE 324*   BUN 13   CREATININE 0.75   CALCIUM 9.1     Recent Labs     10/05/20  0903   ALTSGPT 33   ASTSGOT 23   ALKPHOSPHAT 129*   TBILIRUBIN 0.4   GLUCOSE 324*     Recent Labs     10/05/20  0815   APTT 30.4   INR 0.95             No results for input(s): TROPONINI in the last 72 hours.    Urinalysis:    No results found     Imaging Reviewed Today:  I personally reviewed all non-invasive vascular testing including images, x-rays, tracings, arterial waveforms, and duplex exams relevant to this admission. My interpretation is below:    10/5/2020 CTA: Reported at 75% L carotid stenosis.     I personally reviewed the CT scan and find the stenosis is actually mild by NASCET criteria. There is likely a fractured focal plaque at the bulb. Of more concern, there is a small pedunculated thrombus in the proximal ICA, best seen on series 8, slice 248. The remainder of the distal ICA is diffusely diseased as well, but patent. No signs of hemorrhagic conversion at this time.       Assessment/Plan & Medical Decision-Making  80F w/ hx of CVA/TIA and dementia presenting w/ a left-sided CVA w/ aphasia and right-sided weakness. Unclear if she is back to baseline at this point.     I reviewed her scan in three planes and stenosis is mild, however there is clearly a fractured plaque at the bulb and a small pedunculated thrombus, seen on series 8 slice 248.     This lesion is high-risk for further embolization and would meet criteria for an urgent CEA. However, the patient does not seem to understand her  condition, and it seems unlikely she can make her own decisions. She tells me that she does not want surgery and just wants to go home.    With her baseline mental status and overall condition, would give strong consideration to conservative management w/ anticoagulation.     I attempted to call her children listed as POA (Luba [phone]) to discuss the situation and there was no answer. I left a VM to call me back.    For now, consider anticoagulation if cleared w/ neurology, and increasing to high-intensity statin (lipitor 40 given age). When I am able to reach family, will provide definitive plan.    Rene Verma MD  Vascular Surgeon  [facility]  Office: [phone]

## 2020-10-05 NOTE — ASSESSMENT & PLAN NOTE
Hypertensive on presentation.    Attempt to control blood pressure post surgery    Resume home medications

## 2020-10-05 NOTE — DISCHARGE PLANNING
[facility]itional [facility] Coordination     Referral from: Dr. Shola Turciosheet indicates: Medicare HMO   Potential Rehab Diagnosis: Stroke protocol     Ongoing work up.    Physiatry consult pending.   Medicare HMO [employer]  Medicare Advantage. RRH not a benefit of the provider.

## 2020-10-05 NOTE — ED NOTES
Patient is resting comfortably. Assessment unchanged, aware of pending admission. Will continue to monitor. Remains on monitoring.

## 2020-10-05 NOTE — ED NOTES
Med Rec completed per patient with medication list (returned)  Allergies reviewed  No ORAL antibiotics in last 14 days

## 2020-10-05 NOTE — ED NOTES
Patient resting on cart, awake, alert, daughter at bedside. Patient & family updated on POC, verbalizes understanding. 1 episode of dry heaving noted, declines need for medication, states vomits when she is cold, pt provided with multiple warm blankets prior to episode and additional warm blanket at this time. Denies additional needs or questions, call light within reach, will continue to monitor. Patient remains on monitoring. Siderailsx2, HOB elevated, pt answering questions appropriately at this time. Patient intermittently tearful. Xray at bedside, pt provided with oral swab to wet mouth. Lights dimmed per pt request, will continue to monitor. Cart in low, locked position for safety. Additional labs collected and sent to lab for redraw.

## 2020-10-05 NOTE — ASSESSMENT & PLAN NOTE
Hyperglycemia to 400s on admission.  Most recent A1c was 8.8 approximately a year ago.  Recheck A1c is 12  Sliding scale insulin, Accu-Cheks, hypoglycemia protocol.    Lantus 15 units every morning has been initiated on 10/6/2020    No change for  today as patient is going for surgery and reassess for adjustment of Lantus tomorrow

## 2020-10-05 NOTE — ED PROVIDER NOTES
ED Provider Note    Scribed for Senait Forman M.D. by Donnie Wade. 10/5/2020  8:14 AM    Primary care provider: RANDY Barfield  Means of arrival: Ambulance  History obtained from: EMS and Patient  History limited by: Dementia and Poor Cooperation with Exam.    CHIEF COMPLAINT  Chief Complaint   Patient presents with   • Possible Stroke     HPI  Bailey White is a 80 y.o. female who presents to the Emergency Department for evaluation of right sided weakness onset approximately 7:45 AM this moring. EMS notes additional symptoms of aphasia (Approximately 7:45 AM this morning), but denies facial droop. She was last known normal at 7:00 AM this morning. The patient is on Plavix for history of stroke and TIA. Blood pressure en route was 206/120.    History limited by: Dementia and Poor Cooperation with Exam.    PPE Note: I personally donned full PPE for all patient encounters during this visit, with an N95 respirator mask and gloves.    Scribe remained outside the patient's room and did not have any contact with the patient for the duration of patient encounter.      REVIEW OF SYSTEMS  EYES: no discharge, redness, or vision changes  CARDIAC: no chest pain, no palpitations    PULMONARY: no dyspnea, cough, or congestion   Neuro: Right sided weakness, aphasia. No facial droop. numbness or headache  Musculoskeletal: no swelling, deformity, pain, or joint swelling  Endocrine: no fevers, sweating, or weight loss     See history of present illness.     Review of Systems limited by: Dementia and Poor Cooperation with Exam.    PAST MEDICAL HISTORY   has a past medical history of Anesthesia, Arthritis, Blood transfusion, Bronchitis (2011), Carotid stenosis, CATARACT, CVA (cerebral vascular accident) (HCC), Diabetes, Fibromyalgia, Fibromyalgia, GERD (gastroesophageal reflux disease), Heart burn, Herpes genital, Hiatus hernia syndrome, HTN, transient ischemic attack (TIA), Indigestion, Insomnia, Migraine,  Pain, Seizure (HCC), Thyroid disease, TIA, Unspecified hemorrhagic conditions, Unspecified urinary incontinence, and Urinary bladder disorder.    SURGICAL HISTORY   has a past surgical history that includes other orthopedic surgery; tonsillectomy; other; hysteroscopy with video diagnostic (2013); and dilation and curettage (2013).    SOCIAL HISTORY  Social History     Tobacco Use   • Smoking status: Former Smoker     Packs/day: 3.00     Years: 1.00     Pack years: 3.00     Types: Cigarettes     Quit date: 1955     Years since quittin.8   • Smokeless tobacco: Never Used   Substance Use Topics   • Alcohol use: No     Alcohol/week: 0.0 oz   • Drug use: No      Social History     Substance and Sexual Activity   Drug Use No       FAMILY HISTORY  Family History   Problem Relation Age of Onset   • Heart Disease Father    • Cancer Maternal Grandmother        CURRENT MEDICATIONS  Home Medications     Reviewed by Daniel Moore (Pharmacy Tech) on 10/05/20 at 0854  Med List Status: Partial   Medication Last Dose Status   acyclovir (ZOVIRAX) 200 MG Cap 10/4/2020 Active   amLODIPine (NORVASC) 5 MG Tab 10/4/2020 Active   atorvastatin (LIPITOR) 20 MG Tab 10/4/2020 Active   Cholecalciferol (VITAMIN D) 2000 UNITS CAPS 10/4/2020 Active   clopidogrel (PLAVIX) 75 MG Tab 10/4/2020 Active   Coenzyme Q10 (CO Q 10 PO) 10/4/2020 Active   Cranberry 200 MG Cap 10/4/2020 Active   docusate sodium (COLACE) 100 MG Cap 10/4/2020 Active   glipiZIDE SR (GLUCOTROL) 5 MG TABLET SR 24 HR 10/4/2020 Active   levothyroxine (SYNTHROID) 75 MCG Tab 10/4/2020 Active   Magnesium Gluconate (MAGNESIUM 27 PO) 10/4/2020 Active   omeprazole (PRILOSEC) 40 MG delayed-release capsule 10/4/2020 Active   polyethylene glycol/lytes (MIRALAX) 17 g Pack 10/4/2020 Active                ALLERGIES  Allergies   Allergen Reactions   • Asa [Aspirin]      GI bleeding   • Ciprofloxacin Rash   • Clindamycin Hives   • Codeine Vomiting   • Food Anxiety      "Yellow dye, peanuts and peanut products, all peppers,tomato, potato,wheat, gluten   • Hydrocortisone      Suicidal after Cortisone injection   • Ibuprofen      Abdominal pain   • Imitrex [Sumatriptan Succinate] Vomiting   • Inderal [Propranolol Hcl]      Not sure of reaction   • Lyrica      suicidal   • Mellaril      Psychotic episode   • Other Drug      All mycins,cillins,and cyclines   • Pcn [Penicillins] Hives   • Ropinirole Hcl [Requip] Shortness of Breath   • Shellfish Allergy      headaches   • Sulfa Drugs Itching   • Tagamet [Cimetidine]      Mental confusion       PHYSICAL EXAM  VITAL SIGNS: BP (!) 198/108   Pulse 88   Temp 36.8 °C (98.3 °F) (Temporal)   Resp 16   Ht 1.6 m (5' 3\")   Wt 79.4 kg (175 lb)   LMP 01/01/1983   SpO2 96%   BMI 31.00 kg/m²     Constitutional: Asking for her mother. Tearful,  Well developed, Well nourished, No acute distress, Non-toxic appearance.   HEENT: Normocephalic, Atraumatic,  external ears normal, pharynx pink,  Mucous  Membranes moist, No rhinorrhea or mucosal edema  Eyes: PERRL, EOMI, Conjunctiva normal, No discharge.   Neck: Normal range of motion, No tenderness, Supple, No stridor.   Lymphatic: No lymphadenopathy    Cardiovascular: Regular Rate and Rhythm, No murmurs,  rubs, or gallops.   Thorax & Lungs: Lungs clear to auscultation bilaterally, No respiratory distress, No wheezes, rhales or rhonchi, No chest wall tenderness.   Abdomen: Bowel sounds normal, Soft, non tender, non distended,  No pulsatile masses., no rebound guarding or peritoneal signs.   Skin: Warm, Dry, No erythema, No rash,   Back:  No CVA tenderness,  No spinal tenderness, bony crepitance, step offs, or instability.   Neurologic: Alert & oriented x 3. She has some aphasia. Can name objects and read, but cannot copy sentences. Equal strength to bilateral upper and lower extremities. ([facility] reported the patient initially had some right sided weakness). Hyperparesthesia on the arms and legs. NIH 2 " (For [facility] was NIH 4-5). No facial droop. Normal motor function. Normal reflexes. Normal Cranial Nerves.  Extremities: Equal, intact distal pulses, No cyanosis, clubbing or edema,  No tenderness.   Musculoskeletal: Good range of motion in all major joints. No tenderness to palpation or major deformities noted.     DIAGNOSTIC STUDIES / PROCEDURES    LABS  Results for orders placed or performed during the hospital encounter of 10/05/20   CBC WITH DIFFERENTIAL   Result Value Ref Range    WBC 5.6 4.8 - 10.8 K/uL    RBC 4.59 4.20 - 5.40 M/uL    Hemoglobin 16.1 (H) 12.0 - 16.0 g/dL    Hematocrit 47.2 (H) 37.0 - 47.0 %    .8 (H) 81.4 - 97.8 fL    MCH 35.1 (H) 27.0 - 33.0 pg    MCHC 34.1 33.6 - 35.0 g/dL    RDW 47.4 35.9 - 50.0 fL    Platelet Count 224 164 - 446 K/uL    MPV 11.4 9.0 - 12.9 fL    Neutrophils-Polys 46.80 44.00 - 72.00 %    Lymphocytes 40.60 22.00 - 41.00 %    Monocytes 9.30 0.00 - 13.40 %    Eosinophils 2.70 0.00 - 6.90 %    Basophils 0.40 0.00 - 1.80 %    Immature Granulocytes 0.20 0.00 - 0.90 %    Nucleated RBC 0.00 /100 WBC    Neutrophils (Absolute) 2.63 2.00 - 7.15 K/uL    Lymphs (Absolute) 2.28 1.00 - 4.80 K/uL    Monos (Absolute) 0.52 0.00 - 0.85 K/uL    Eos (Absolute) 0.15 0.00 - 0.51 K/uL    Baso (Absolute) 0.02 0.00 - 0.12 K/uL    Immature Granulocytes (abs) 0.01 0.00 - 0.11 K/uL    NRBC (Absolute) 0.00 K/uL   PROTHROMBIN TIME   Result Value Ref Range    PT 13.0 12.0 - 14.6 sec    INR 0.95 0.87 - 1.13   APTT   Result Value Ref Range    APTT 30.4 24.7 - 36.0 sec   COD (ADULT)   Result Value Ref Range    ABO Grouping Only O     Rh Grouping Only POS     Antibody Screen-Cod NEG    ABO Rh Confirm   Result Value Ref Range    ABO Rh Confirm O POS    Comp Metabolic Panel   Result Value Ref Range    Sodium 133 (L) 135 - 145 mmol/L    Potassium 3.9 3.6 - 5.5 mmol/L    Chloride 98 96 - 112 mmol/L    Co2 24 20 - 33 mmol/L    Anion Gap 11.0 7.0 - 16.0    Glucose 324 (H) 65 - 99 mg/dL    Bun 13 8 - 22 mg/dL     Creatinine 0.75 0.50 - 1.40 mg/dL    Calcium 9.1 8.5 - 10.5 mg/dL    AST(SGOT) 23 12 - 45 U/L    ALT(SGPT) 33 2 - 50 U/L    Alkaline Phosphatase 129 (H) 30 - 99 U/L    Total Bilirubin 0.4 0.1 - 1.5 mg/dL    Albumin 4.2 3.2 - 4.9 g/dL    Total Protein 7.1 6.0 - 8.2 g/dL    Globulin 2.9 1.9 - 3.5 g/dL    A-G Ratio 1.4 g/dL   TROPONIN   Result Value Ref Range    Troponin T 10 6 - 19 ng/L   ESTIMATED GFR   Result Value Ref Range    GFR If African American >60 >60 mL/min/1.73 m 2    GFR If Non African American >60 >60 mL/min/1.73 m 2   Magnesium   Result Value Ref Range    Magnesium 1.8 1.5 - 2.5 mg/dL   Hemoglobin A1C   Result Value Ref Range    Glycohemoglobin 12.0 (H) 0.0 - 5.6 %    Est Avg Glucose 298 mg/dL   EKG (NOW)   Result Value Ref Range    Report       [facility] Emergency Dept.    Test Date:  2020-10-05  Pt Name:    TARA JAMES             Department: ER  MRN:        [id number]                      Room:        10  Gender:     Female                       Technician: [id number]  :        [id number]                   Requested By:ENOCH WATKINS  Order #:    [id number]                    Reading MD: ENOCH WATKINS MD    Measurements  Intervals                                Axis  Rate:       89                           P:          62  NM:         158                          QRS:        -13  QRSD:       78                           T:          55  QT:         388  QTc:        473    Interpretive Statements  SINUS RHYTHM  BORDERLINE LOW VOLTAGE IN FRONTAL LEADS  EARLY PRECORDIAL R/S TRANSITION  Compared to ECG 2017 16:28:17  No significant changes  Electronically Signed On 10-5-2020 9:19:06 PDT by ENOCH WATKINS MD        All labs reviewed by me.    EKG  12 lead EKG; Interpreted by emergency department physician as above    RADIOLOGY  DX-CHEST-PORTABLE (1 VIEW)   Final Result      Mild hypoinflation without other evidence for acute cardiopulmonary disease.      CT-CTA NECK WITH &  W/O-POST PROCESSING   Final Result      1.  Approximately 75% narrowing at the origin of the left internal carotid artery.      2.  Approximately 25% focal stenosis at the origin of the right internal carotid artery.      3.  Patent bilateral vertebral arteries.      4.  Minimal narrowing at the origin of the left subclavian artery.      CT-CTA HEAD WITH & W/O-POST PROCESS   Final Result      CT angiogram of the [place] within normal limits.      CT-HEAD W/O   Final Result      1.  No acute intracranial process.      2.  Atrophy and small vessel ischemic change.        The radiologist's interpretation of all radiological studies have been reviewed by me.    COURSE & MEDICAL DECISION MAKING  Nursing notes, VS, PMSFHx reviewed in chart.    8:14 AM Patient seen and examined at bedside. Dr. Mcgrath (Neurology) evaluated the patient at bedside. No alteplase will be administered because the patient is already on Plavix for history of stroke and TIA. Patient will be treated with Zofran 4 mg injection. Ordered DX-Chest, CT-head, CT-CTA Head, CT-CTA Neck, EKG, CBC with diff, CMP, Prothrombin Time, APTT, COD, Troponin, ABO Rh Confirm to evaluate her symptoms. The differential diagnoses include but are not limited to: TIA, Intracranial Hemorrhage, Stroke, Infection.    9:15 AM - Paged Hospitalist.      9:20 AM - I discussed the patient's case and the above findings with Dr. Andrews (Hospitalist) who will assess the patient for hospitalization. He recommended Vascular Surgery consult on the patient.     9:21 AM - I discussed the patient's case and the above findings with Dr. Mcgrath (Neurology) who says the patient needs an MRI for further evaluation. He states the CTA shows stenosis and recommended the patient be admitted for TIA workup.      9:23 AM - Paged Vascular Surgery.      9:26 AM - I discussed the patient's case and the above findings with Dr. Verma ([facility] Surgery) who will consult on the patient.       CRITICAL CARE  The very real possibility of a deterioration of this patient's condition required the highest level of my preparedness for sudden, emergent intervention.  I provided critical care services, which included medication orders, frequent reevaluations of the patient's condition and response to treatment, ordering and reviewing test results, and discussing the case with various consultants.  The critical care time associated with the care of the patient was 40 minutes. Review chart for interventions. This time is exclusive of any other billable procedures.      DISPOSITION:  Patient will be hospitalized by Dr. Jolley in critical condition.     FINAL IMPRESSION  1. TIA (transient ischemic attack)    2. Left carotid stenosis    3. Cerebrovascular accident (CVA), unspecified mechanism (HCC)    3.      The critical care time associated with the care of the patient was 40 minutes.       Donnie VALDEZ (Yaneli), am scribing for, and in the presence of, Senait Forman M.D..    Electronically signed by: Donnie Wade (Yaneli), 10/5/2020    Senait VALDEZ M.D. personally performed the services described in this documentation, as scribed by Donnie Wade in my presence, and it is both accurate and complete.    The note accurately reflects work and decisions made by me.  Senait Forman M.D.  10/5/2020  2:42 PM

## 2020-10-05 NOTE — ASSESSMENT & PLAN NOTE
Recurrent.  Clopidogrel per home meds.    Weight-based heparin   patient reportedly has an aspirin allergy.  High-dose atorvastatin.  Neurology consulted, appreciate recommendations.  Manage diabetes as noted.

## 2020-10-05 NOTE — ASSESSMENT & PLAN NOTE
75% narrowing of left ICA per CTA.      Weight-based heparin    Carotid enterectomy planned for today

## 2020-10-05 NOTE — ED TRIAGE NOTES
Chief Complaint   Patient presents with   • Possible Stroke     Pt brought in by ems from home.  EMS reports pt presenting with expressive aphasia and right sided weakness that began at 0745.  Stoke alert called.  Neuro and ERP at bedside upon pt arrival.  Pt to CT.  Pt began having nausea and dry heaving.  ERP notified.  Pt medicated per mar.  Pt to red 10.  Report to Mela MATHEWS.  Connected to cardiac monitor, BP cuff, and continuous pulse ox upon arrival.  Pt in gown, call light in reach, bed in lowest position.

## 2020-10-05 NOTE — ED NOTES
This RN assumes care of patient. Patient resting on cart, placed on all monitoring. Patient positioned with HOB elevated, siderailsx2, call light within reach. purewick in place for patient to void. Patient answering most questions appropriately, quickly becomes agitated/tearful intermittently, provided with warm blankets. Patient positioned with HOB elevated, siderailsx2, cart in low, locked position.

## 2020-10-05 NOTE — THERAPY
Speech Language Pathology   Clinical Swallow Evaluation     Patient Name: Bailey White  AGE:  80 y.o., SEX:  female  Medical Record #: [id number]  Today's Date: 10/5/2020     Precautions  Precautions: Fall Risk, Swallow Precautions ( See Comments)    Assessment    80 y.o. female w/ dementia, still lives at home, who presented 10/5/2020 with right-sided weakness, aphasia, and headache. She has prior hx of stroke/TIA and is on plavix at baseline. CTA neck showed approximately 75% narrowing at the origin of the left internal carotid artery.     The patient was seen on this date for clinical swallow evaluation. Patient originally passed bedside screening but had emesis quickly following sips of water. Patient was awake and alert x4 upon arrival of SLP and presenting with expressive aphasia. She was willing to participate in CSE with minimal encouragement. Pt consumed PO trials of ice, thin liquids, liquidized solids and soft solids on this date. The patient refused regular textures and stated that they would be too hard for her as she is edentulous. Patient consumed all trials with no overt s/sx of aspiration/penetration. Vocal quality remained clear and hyolaryngeal excursion was palpated as complete and timely. The patient and her daughter, present at bedside, endorsed emesis following meals being a frequent thing and she is being followed by GI at [facility]. Recommend downgrade to soft solids as pt reports this her baseline with thin liquids.      Plan    Recommend Speech Therapy 3 times per week until therapy goals are met for the following treatments:  Dysphagia Training.    Recommend downgrade to soft solids as pt reports this her baseline with thin liquids.     Discharge Recommendations: Anticipate that the patient will have no further speech therapy needs after discharge from the hospital       Objective       10/05/20 1441   Oral Motor Eval    Is Patient Able to Complete Oral Motor Eval Yes,  Within Normal Limits   Laryngeal Function   Voice Quality Within Functional Limits   Volutional Cough Within Functional Limits   Excursion Upon Swallow Complete   Oral Food Presentation   Ice Chips Within Functional Limits   Single Swallow Thin (0) Within Functional Limits   Serial Swallow Thin (0) Within Functional Limits   Liquidised (3) Within Functional Limits   Minced & Moist (5) - (Dysphagia II) Within Functional Limits   Soft & Bite-Sized (6) - (Dysphagia III) Within Functional Limits   Regular (7) Within Functional Limits   Self Feeding Independent   Tracheostomy   Tracheostomy  No   Dysphagia Strategies / Recommendations   Strategies / Interventions Recommended (Yes / No) Yes   Diet / Liquid Recommendation Thin (0);Soft & Bite-Sized (6) - (Dysphagia III)   Medication Administration  Float Whole with Puree   Dysphagia Rating   Nutritional Liquid Intake Rating Scale Non thickened beverages   Nutritional Food Intake Rating Scale Total oral diet with multiple consistencies without special preparation but with specific food limitations

## 2020-10-06 ENCOUNTER — ANESTHESIA EVENT (OUTPATIENT)
Dept: SURGERY | Facility: MEDICAL CENTER | Age: 80
DRG: 038 | End: 2020-10-06
Payer: MEDICARE

## 2020-10-06 ENCOUNTER — APPOINTMENT (OUTPATIENT)
Dept: RADIOLOGY | Facility: MEDICAL CENTER | Age: 80
DRG: 038 | End: 2020-10-06
Attending: PSYCHIATRY & NEUROLOGY
Payer: MEDICARE

## 2020-10-06 LAB
ALBUMIN SERPL BCP-MCNC: 4.3 G/DL (ref 3.2–4.9)
ALBUMIN/GLOB SERPL: 1.3 G/DL
ALP SERPL-CCNC: 99 U/L (ref 30–99)
ALT SERPL-CCNC: 31 U/L (ref 2–50)
ANION GAP SERPL CALC-SCNC: 14 MMOL/L (ref 7–16)
APTT PPP: 30.9 SEC (ref 24.7–36)
AST SERPL-CCNC: 17 U/L (ref 12–45)
BASOPHILS # BLD AUTO: 0.3 % (ref 0–1.8)
BASOPHILS # BLD: 0.02 K/UL (ref 0–0.12)
BILIRUB SERPL-MCNC: 0.9 MG/DL (ref 0.1–1.5)
BUN SERPL-MCNC: 21 MG/DL (ref 8–22)
CALCIUM SERPL-MCNC: 9.6 MG/DL (ref 8.5–10.5)
CHLORIDE SERPL-SCNC: 98 MMOL/L (ref 96–112)
CHOLEST SERPL-MCNC: 166 MG/DL (ref 100–199)
CO2 SERPL-SCNC: 23 MMOL/L (ref 20–33)
COVID ORDER STATUS COVID19: NORMAL
CREAT SERPL-MCNC: 0.96 MG/DL (ref 0.5–1.4)
EOSINOPHIL # BLD AUTO: 0.06 K/UL (ref 0–0.51)
EOSINOPHIL NFR BLD: 1 % (ref 0–6.9)
ERYTHROCYTE [DISTWIDTH] IN BLOOD BY AUTOMATED COUNT: 47 FL (ref 35.9–50)
GLOBULIN SER CALC-MCNC: 3.2 G/DL (ref 1.9–3.5)
GLUCOSE BLD-MCNC: 218 MG/DL (ref 65–99)
GLUCOSE BLD-MCNC: 245 MG/DL (ref 65–99)
GLUCOSE BLD-MCNC: 253 MG/DL (ref 65–99)
GLUCOSE BLD-MCNC: 314 MG/DL (ref 65–99)
GLUCOSE SERPL-MCNC: 266 MG/DL (ref 65–99)
HCT VFR BLD AUTO: 51 % (ref 37–47)
HDLC SERPL-MCNC: 54 MG/DL
HGB BLD-MCNC: 17 G/DL (ref 12–16)
IMM GRANULOCYTES # BLD AUTO: 0.01 K/UL (ref 0–0.11)
IMM GRANULOCYTES NFR BLD AUTO: 0.2 % (ref 0–0.9)
INR PPP: 1.03 (ref 0.87–1.13)
LDLC SERPL CALC-MCNC: 89 MG/DL
LYMPHOCYTES # BLD AUTO: 1.88 K/UL (ref 1–4.8)
LYMPHOCYTES NFR BLD: 29.9 % (ref 22–41)
MCH RBC QN AUTO: 34.3 PG (ref 27–33)
MCHC RBC AUTO-ENTMCNC: 33.3 G/DL (ref 33.6–35)
MCV RBC AUTO: 103 FL (ref 81.4–97.8)
MONOCYTES # BLD AUTO: 0.6 K/UL (ref 0–0.85)
MONOCYTES NFR BLD AUTO: 9.5 % (ref 0–13.4)
NEUTROPHILS # BLD AUTO: 3.72 K/UL (ref 2–7.15)
NEUTROPHILS NFR BLD: 59.1 % (ref 44–72)
NRBC # BLD AUTO: 0 K/UL
NRBC BLD-RTO: 0 /100 WBC
PLATELET # BLD AUTO: 219 K/UL (ref 164–446)
PMV BLD AUTO: 10.6 FL (ref 9–12.9)
POTASSIUM SERPL-SCNC: 3.8 MMOL/L (ref 3.6–5.5)
PROT SERPL-MCNC: 7.5 G/DL (ref 6–8.2)
PROTHROMBIN TIME: 13.8 SEC (ref 12–14.6)
RBC # BLD AUTO: 4.95 M/UL (ref 4.2–5.4)
SARS-COV-2 RNA RESP QL NAA+PROBE: NOTDETECTED
SODIUM SERPL-SCNC: 135 MMOL/L (ref 135–145)
SPECIMEN SOURCE: NORMAL
TRIGL SERPL-MCNC: 115 MG/DL (ref 0–149)
UFH PPP CHRO-ACNC: 0.15 IU/ML
WBC # BLD AUTO: 6.3 K/UL (ref 4.8–10.8)

## 2020-10-06 PROCEDURE — 770020 HCHG ROOM/CARE - TELE (206)

## 2020-10-06 PROCEDURE — 85025 COMPLETE CBC W/AUTO DIFF WBC: CPT

## 2020-10-06 PROCEDURE — 85730 THROMBOPLASTIN TIME PARTIAL: CPT

## 2020-10-06 PROCEDURE — 36415 COLL VENOUS BLD VENIPUNCTURE: CPT

## 2020-10-06 PROCEDURE — 96372 THER/PROPH/DIAG INJ SC/IM: CPT

## 2020-10-06 PROCEDURE — 700102 HCHG RX REV CODE 250 W/ 637 OVERRIDE(OP): Performed by: HOSPITALIST

## 2020-10-06 PROCEDURE — 700102 HCHG RX REV CODE 250 W/ 637 OVERRIDE(OP): Performed by: INTERNAL MEDICINE

## 2020-10-06 PROCEDURE — 99233 SBSQ HOSP IP/OBS HIGH 50: CPT | Performed by: HOSPITALIST

## 2020-10-06 PROCEDURE — A9270 NON-COVERED ITEM OR SERVICE: HCPCS | Performed by: INTERNAL MEDICINE

## 2020-10-06 PROCEDURE — 97165 OT EVAL LOW COMPLEX 30 MIN: CPT

## 2020-10-06 PROCEDURE — 80061 LIPID PANEL: CPT

## 2020-10-06 PROCEDURE — 80053 COMPREHEN METABOLIC PANEL: CPT

## 2020-10-06 PROCEDURE — U0003 INFECTIOUS AGENT DETECTION BY NUCLEIC ACID (DNA OR RNA); SEVERE ACUTE RESPIRATORY SYNDROME CORONAVIRUS 2 (SARS-COV-2) (CORONAVIRUS DISEASE [COVID-19]), AMPLIFIED PROBE TECHNIQUE, MAKING USE OF HIGH THROUGHPUT TECHNOLOGIES AS DESCRIBED BY CMS-2020-01-R: HCPCS

## 2020-10-06 PROCEDURE — 700111 HCHG RX REV CODE 636 W/ 250 OVERRIDE (IP): Performed by: HOSPITALIST

## 2020-10-06 PROCEDURE — 70551 MRI BRAIN STEM W/O DYE: CPT

## 2020-10-06 PROCEDURE — 700102 HCHG RX REV CODE 250 W/ 637 OVERRIDE(OP): Performed by: NURSE PRACTITIONER

## 2020-10-06 PROCEDURE — A9270 NON-COVERED ITEM OR SERVICE: HCPCS | Performed by: NURSE PRACTITIONER

## 2020-10-06 PROCEDURE — 85610 PROTHROMBIN TIME: CPT

## 2020-10-06 PROCEDURE — 82962 GLUCOSE BLOOD TEST: CPT | Mod: 91

## 2020-10-06 PROCEDURE — 700111 HCHG RX REV CODE 636 W/ 250 OVERRIDE (IP): Performed by: INTERNAL MEDICINE

## 2020-10-06 PROCEDURE — 99233 SBSQ HOSP IP/OBS HIGH 50: CPT | Performed by: PSYCHIATRY & NEUROLOGY

## 2020-10-06 PROCEDURE — 85520 HEPARIN ASSAY: CPT

## 2020-10-06 RX ORDER — HEPARIN SODIUM 5000 [USP'U]/100ML
0-30 INJECTION, SOLUTION INTRAVENOUS CONTINUOUS
Status: DISCONTINUED | OUTPATIENT
Start: 2020-10-06 | End: 2020-10-07

## 2020-10-06 RX ORDER — INSULIN GLARGINE 100 [IU]/ML
15 INJECTION, SOLUTION SUBCUTANEOUS
Status: DISCONTINUED | OUTPATIENT
Start: 2020-10-06 | End: 2020-10-08 | Stop reason: HOSPADM

## 2020-10-06 RX ADMIN — ACETAMINOPHEN 650 MG: 325 TABLET, FILM COATED ORAL at 21:55

## 2020-10-06 RX ADMIN — CLOPIDOGREL BISULFATE 75 MG: 75 TABLET ORAL at 05:40

## 2020-10-06 RX ADMIN — OMEPRAZOLE 40 MG: 20 CAPSULE, DELAYED RELEASE ORAL at 05:40

## 2020-10-06 RX ADMIN — DOCUSATE SODIUM 50 MG AND SENNOSIDES 8.6 MG 2 TABLET: 8.6; 5 TABLET, FILM COATED ORAL at 05:40

## 2020-10-06 RX ADMIN — ACETAMINOPHEN 650 MG: 325 TABLET, FILM COATED ORAL at 15:18

## 2020-10-06 RX ADMIN — INSULIN LISPRO 2 UNITS: 100 INJECTION, SOLUTION INTRAVENOUS; SUBCUTANEOUS at 17:28

## 2020-10-06 RX ADMIN — INSULIN GLARGINE 15 UNITS: 100 INJECTION, SOLUTION SUBCUTANEOUS at 12:00

## 2020-10-06 RX ADMIN — INSULIN LISPRO 2 UNITS: 100 INJECTION, SOLUTION INTRAVENOUS; SUBCUTANEOUS at 07:35

## 2020-10-06 RX ADMIN — LEVOTHYROXINE SODIUM 75 MCG: 25 TABLET ORAL at 05:40

## 2020-10-06 RX ADMIN — INSULIN LISPRO 3 UNITS: 100 INJECTION, SOLUTION INTRAVENOUS; SUBCUTANEOUS at 21:54

## 2020-10-06 RX ADMIN — HEPARIN SODIUM 12 UNITS/KG/HR: 5000 INJECTION, SOLUTION INTRAVENOUS at 20:08

## 2020-10-06 RX ADMIN — OMEPRAZOLE 40 MG: 20 CAPSULE, DELAYED RELEASE ORAL at 17:24

## 2020-10-06 RX ADMIN — DIPHENHYDRAMINE HYDROCHLORIDE 25 MG: 25 TABLET ORAL at 21:55

## 2020-10-06 RX ADMIN — ATORVASTATIN CALCIUM 40 MG: 40 TABLET, FILM COATED ORAL at 17:24

## 2020-10-06 RX ADMIN — ENOXAPARIN SODIUM 40 MG: 40 INJECTION SUBCUTANEOUS at 05:40

## 2020-10-06 RX ADMIN — ACETAMINOPHEN 650 MG: 325 TABLET, FILM COATED ORAL at 07:44

## 2020-10-06 RX ADMIN — INSULIN LISPRO 4 UNITS: 100 INJECTION, SOLUTION INTRAVENOUS; SUBCUTANEOUS at 11:59

## 2020-10-06 ASSESSMENT — ENCOUNTER SYMPTOMS
PHOTOPHOBIA: 0
DIARRHEA: 0
NAUSEA: 0
HEMOPTYSIS: 0
ABDOMINAL PAIN: 0
SPEECH CHANGE: 1
ORTHOPNEA: 0
VOMITING: 0
COUGH: 0
CHILLS: 0
PSYCHIATRIC NEGATIVE: 1
WEIGHT LOSS: 0
DOUBLE VISION: 0
SPUTUM PRODUCTION: 0
BLURRED VISION: 0
FEVER: 0
HEADACHES: 1
CLAUDICATION: 0
HEARTBURN: 0

## 2020-10-06 ASSESSMENT — COGNITIVE AND FUNCTIONAL STATUS - GENERAL
HELP NEEDED FOR BATHING: A LITTLE
DAILY ACTIVITIY SCORE: 23
SUGGESTED CMS G CODE MODIFIER DAILY ACTIVITY: CI

## 2020-10-06 ASSESSMENT — PATIENT HEALTH QUESTIONNAIRE - PHQ9
1. LITTLE INTEREST OR PLEASURE IN DOING THINGS: NOT AT ALL
SUM OF ALL RESPONSES TO PHQ9 QUESTIONS 1 AND 2: 0
2. FEELING DOWN, DEPRESSED, IRRITABLE, OR HOPELESS: NOT AT ALL

## 2020-10-06 ASSESSMENT — PAIN DESCRIPTION - PAIN TYPE
TYPE: ACUTE PAIN

## 2020-10-06 ASSESSMENT — FIBROSIS 4 INDEX: FIB4 SCORE: 1.14

## 2020-10-06 ASSESSMENT — ACTIVITIES OF DAILY LIVING (ADL): TOILETING: INDEPENDENT

## 2020-10-06 NOTE — PROGRESS NOTES
Progress Note    CC: headache    HPI:  Ms. White is an 80 y.o. female w/ dementia, still lives at home, who presented 10/5/2020 with right-sided weakness, aphasia, and headache. She has prior hx of stroke/TIA and is on plavix at baseline.      Workup suggested high-grade left carotid stenosis.    Today, she is alert, oriented and seems very aware of her situation. States that she has had multiple TIAs and stroke in the past. We discussed the findings from the CT and she states that she has a lot of life to live for her grandchildren and great-grandchildren. If the choice is made to do carotid endarterectomy, then she would like to have surgery.     ROS:  Mild headache, no visual changes, weakness or dizziness currently.    Physical Exam  General appearance: frail, comfortable, no distress  Psych: answers questions appropriately, cooperative, normal mood and affect  Neuro: AOx3, CN II-XII grossly intact. No obvious facial droop. Moving all extremities. Equal  strength  Neck: full range of motion  Lungs: No inspiratory stridor or wheezing  CV: RRR  Abdomen: Soft, NT/ND  Skin: No rashes      Imaging Reviewed:     10/5/2020 CTA: Reported at 75% L carotid stenosis.      I personally reviewed the CT scan and find the stenosis is actually mild by NASCET criteria. There is likely a fractured focal plaque at the bulb. Of more concern, there is a small pedunculated thrombus in the proximal ICA, best seen on series 8, slice 248. The remainder of the distal ICA is diffusely diseased as well, but patent. No signs of hemorrhagic conversion at this time.         Assessment/Plan & Medical Decision-Making  80F w/ hx of CVA/TIA and dementia presenting w/ a left-sided CVA w/ aphasia and right-sided weakness.      Scan review by Dr. Verma in three planes and stenosis is mild, however there is clearly a fractured plaque at the bulb and a small pedunculated thrombus, seen on series 8 slice 248.      This lesion is high-risk  for further embolization and would meet criteria for an urgent CEA.     Yesterday, the patient did not seem to understand her condition, and it seemed unlikely she could make her own decisions.     Today, she is answering appropriately, and has good insight into her condition. I explained the findings on CT and that the surgical option would be a carotid endarterectomy. She understands the risks and recovery and would like to proceed if surgery is the correct medical decision.     I will discuss her mental status changes with Dr. Verma. He has not heard back from the POA ([place] [phone]).    Continue conservative management measures w/ anticoagulation, Plavix and statin. Definitive plan to be discussed with Dr. Verma.       Renetta Lindsay PA-C  Vascular Surgery  [facility]  Office: [phone]

## 2020-10-06 NOTE — PROGRESS NOTES
Monitor summary: SR 63-95, KS 0.15, QRS 0.10, QT 0.40, with rare PVCs and PVCs per strip from monitor room.

## 2020-10-06 NOTE — THERAPY
"Occupational Therapy   Initial Evaluation     Patient Name: Bailey White  Age:  80 y.o., Sex:  female  Medical Record #: [id number]  Today's Date: 10/6/2020       Precautions: Swallow Precautions, Fall Risk    Assessment    Patient is 80 y.o. female with h/o CVA, DM, fibromyalgia, HTN, admitted with R-sided weakness, aphasia. CT showed L ICA stenosis; MRI awaiting results. Pt seen for OT eval. Pt pleasant, cooperative, oriented, following commands without difficulty. No balance deficits observed during functional mobility. Pt able to don socks, underpants, complete toileting without difficulty. Denies vision changes. Pt is completing basic ADL and transfers with no more than supv. Pt appears to at or near baseline function from OT standpoint. Has shower chair and grab bars in place at home. No further OT needs at this time, but OT will remain available if new needs arise.     Plan    Recommend Occupational Therapy for Evaluation only. Patient will not be actively followed for occupational therapy services at this time, however may be seen if requested by physician for 1 more visit within 30 days to address any discharge or equipment needs.     DC Equipment Recommendations: None  Discharge Recommendations: Anticipate that the patient will have no further occupational therapy needs after discharge from the hospital     Subjective    \"I feel fine\"     Objective       10/06/20 1058   Prior Living Situation   Housing / Facility 1 Story Apartment / Condo   Steps Into Home 1   Bathroom Set up Bathtub / Shower Combination;Grab Bars;Shower Chair   Equipment Owned Grab Bar(s) In Tub / Shower;Grab Bar(s) By Toilet;Tub / Shower Seat;4-Wheel Walker;Other (Comments)  (Transport Chair)   Comments Pt lives with daughter and MICHELLE who assist with transportation and housekeeping only    Prior Level of ADL Function   Self Feeding Independent   Grooming / Hygiene Independent   Bathing Independent   Dressing Independent "   Toileting Independent   Prior Level of IADL Function   Medication Management Independent   Laundry Independent   Kitchen Mobility Independent   Finances Independent   Home Management Requires Assist   Shopping Independent   Prior Level Of Mobility Independent Without Device in Community;Independent Without Device in Home  (limited community distances )   Driving / Transportation Relatives / Others Provide Transportation   Cognition    Comments Very pleasant & cooperative, A+Ox4, no word-finding difficulty noted    Strength Upper Body   Upper Body Strength  WDL   Comments 4+/5 to 5/5   Sensation Upper Body   Upper Extremity Sensation  WDL   Upper Body Muscle Tone   Upper Body Muscle Tone  WDL   Coordination Upper Body   Coordination WDL   Comments B digit opposition, B YOGESH    Bed Mobility    Supine to Sit Modified Independent   Scooting Modified Independent   ADL Assessment   Grooming   (NT, declined )   Lower Body Dressing Supervision  (don hosp socks and underpants )   Toileting Supervision  (urination on toilet )   Functional Mobility   Sit to Stand Supervised   Bed, Chair, Wheelchair Transfer Supervised   Toilet Transfers Supervised   Transfer Method Stand Step  (no AD)   Visual Perception   Comments wear glasses which are not present, but still able to read larger print from a distance

## 2020-10-06 NOTE — DISCHARGE PLANNING
Repeated rehab referral from Dr. Starks MRI pending therapy evaluations spending. Physiatry consult pending.

## 2020-10-06 NOTE — PROGRESS NOTES
Neurology Progress Note  Neurohospitalist Service, [facility]    Referring Physician: Francisco J Starks M.D.    Chief Complaint   Patient presents with   • Possible Stroke       HPI: Refer to initial documented Neurology H&P, as detailed in the patient's chart.    Interval History October 6, 2020: No new events overnight.  Patient denies any weakness or numbness.  She does say she has difficulties getting certain words out.    Review of systems: In addition to what is detailed in the HPI and/or updated in the interval history, all other systems reviewed and are negative.    Past Medical History:    has a past medical history of Anesthesia, Arthritis, Blood transfusion, Bronchitis (2011), Carotid stenosis, CATARACT, CVA (cerebral vascular accident) (HCC), Diabetes, Fibromyalgia, Fibromyalgia, GERD (gastroesophageal reflux disease), Heart burn, Herpes genital, Hiatus hernia syndrome, HTN, transient ischemic attack (TIA), Indigestion, Insomnia, Migraine, Pain, Seizure (HCC), Thyroid disease, TIA, Unspecified hemorrhagic conditions, Unspecified urinary incontinence, and Urinary bladder disorder. She also has no past medical history of ASTHMA or Breast cancer ([facility]).    FHx:  family history includes Cancer in her maternal grandmother; Heart Disease in her father.    SHx:   reports that she quit smoking about 65 years ago. Her smoking use included cigarettes. She has a 3.00 pack-year smoking history. She has never used smokeless tobacco. She reports that she does not drink alcohol or use drugs.    Medications:    Current Facility-Administered Medications:   •  insulin glargine (Lantus) injection, 15 Units, Subcutaneous, [facility] INSULIN, Francisco J Starks M.D.  •  enoxaparin (LOVENOX) inj 40 mg, 40 mg, Subcutaneous, DAILY, Hailey Jolley M.D., 40 mg at 10/06/20 0540  •  acetaminophen (TYLENOL) tablet 650 mg, 650 mg, Oral, Q6HRS PRN, Hailey Jolley M.D., 650 mg at 10/06/20 0717  •  ondansetron (ZOFRAN)  syringe/vial injection 4 mg, 4 mg, Intravenous, Q4HRS PRN, Hailey Jolley M.D.  •  ondansetron (ZOFRAN ODT) dispertab 4 mg, 4 mg, Oral, Q4HRS PRN, Hailey Jolley M.D.  •  senna-docusate (PERICOLACE or SENOKOT S) 8.6-50 MG per tablet 2 Tab, 2 Tab, Oral, BID, 2 Tab at 10/06/20 0540 **AND** polyethylene glycol/lytes (MIRALAX) PACKET 1 Packet, 1 Packet, Oral, QDAY PRN **AND** magnesium hydroxide (MILK OF MAGNESIA) suspension 30 mL, 30 mL, Oral, QDAY PRN **AND** bisacodyl (DULCOLAX) suppository 10 mg, 10 mg, Rectal, QDAY PRN, Hailey Jolley M.D.  •  Pharmacy consult request - Allow for permissive hypertension: SBP up to 220 mmHg/DBP up to 120 mmHg x 48 hours, , Other, PHARMACY TO DOSE, Hailey Jolley M.D.  •  atorvastatin (LIPITOR) tablet 40 mg, 40 mg, Oral, Q EVENING, Hailey Jolley M.D., 40 mg at 10/05/20 1746  •  insulin lispro (HumaLOG) injection, 1-6 Units, Subcutaneous, 4X/DAY ACHS, 2 Units at 10/06/20 0735 **AND** POC Blood Glucose, , , Q AC AND BEDTIME(S) **AND** NOTIFY MD and PharmD, , , Once **AND** glucose 4 g chewable tablet 16 g, 16 g, Oral, Q15 MIN PRN **AND** dextrose 50% (D50W) injection 50 mL, 50 mL, Intravenous, Q15 MIN PRN, Hailey Jolley M.D.  •  clopidogrel (PLAVIX) tablet 75 mg, 75 mg, Oral, DAILY, Hailey Jolley M.D., 75 mg at 10/06/20 0540  •  levothyroxine (SYNTHROID) tablet 75 mcg, 75 mcg, Oral, AM ES, Hailey Jolley M.D., 75 mcg at 10/06/20 0540  •  omeprazole (PRILOSEC) capsule 40 mg, 40 mg, Oral, BID, Hailey Jolley M.D., 40 mg at 10/06/20 0540  •  diphenhydrAMINE (BENADRYL) tablet/capsule 25 mg, 25 mg, Oral, HS PRN, Shannon Zavala A.P.R.N., 25 mg at 10/05/20 2333    Physical Examination:     Vitals:    10/05/20 1530 10/05/20 2000 10/05/20 2357 10/06/20 0400   BP: 152/89 137/69 118/64 112/61   Pulse: 100 82 83 64   Resp: 16 16 16 16   Temp: 36.9 °C (98.5 °F) 36.1 °C (97 °F) 35.9 °C (96.7 °F) 36.2 °C (97.1 °F)   TempSrc: Temporal Temporal Temporal Temporal   SpO2: 94% 97%  96% 92%   Weight:       Height:           General: Patient is awake and in no acute distress  Eyes: examination of optic disks not indicated at this time  CV: RRR    NEUROLOGICAL EXAM:     Mental status: Awake, alert and fully oriented, follows commands  Speech and language: speech is clear and fluent. The patient is able to name and repeat.  Cranial nerve exam: Pupils are equal, round and reactive to light bilaterally. Visual fields are full. Extraocular muscles are intact. Sensation in the face is intact to light touch. Face is symmetric. Hearing to finger rub equal. Palate elevates symmetrically. Shoulder shrug is full. Tongue is midline.  Motor exam: Strength is 5/5 in all extremities both distally and proximally. Tone is normal. No abnormal movements were seen on exam.  Sensory exam: No sensory deficits identified   Deep tendon reflexes:  2+ and symmetric. Toes down-going bilaterally.  Coordination: no ataxia       Objective Data:    Labs:  Lab Results   Component Value Date/Time    PROTHROMBTM 13.0 10/05/2020 08:15 AM    INR 0.95 10/05/2020 08:15 AM      Lab Results   Component Value Date/Time    WBC 6.3 10/06/2020 08:41 AM    RBC 4.95 10/06/2020 08:41 AM    HEMOGLOBIN 17.0 (H) 10/06/2020 08:41 AM    HEMATOCRIT 51.0 (H) 10/06/2020 08:41 AM    .0 (H) 10/06/2020 08:41 AM    MCH 34.3 (H) 10/06/2020 08:41 AM    MCHC 33.3 (L) 10/06/2020 08:41 AM    MPV 10.6 10/06/2020 08:41 AM    NEUTSPOLYS 59.10 10/06/2020 08:41 AM    LYMPHOCYTES 29.90 10/06/2020 08:41 AM    MONOCYTES 9.50 10/06/2020 08:41 AM    EOSINOPHILS 1.00 10/06/2020 08:41 AM    BASOPHILS 0.30 10/06/2020 08:41 AM      Lab Results   Component Value Date/Time    SODIUM 135 10/06/2020 08:41 AM    POTASSIUM 3.8 10/06/2020 08:41 AM    CHLORIDE 98 10/06/2020 08:41 AM    CO2 23 10/06/2020 08:41 AM    GLUCOSE 266 (H) 10/06/2020 08:41 AM    BUN 21 10/06/2020 08:41 AM    CREATININE 0.96 10/06/2020 08:41 AM    CREATININE 1.0 03/06/2009 09:50 AM      Lab Results    Component Value Date/Time    CHOLSTRLTOT 166 10/06/2020 08:41 AM    LDL 89 10/06/2020 08:41 AM    HDL 54 10/06/2020 08:41 AM    TRIGLYCERIDE 115 10/06/2020 08:41 AM       Lab Results   Component Value Date/Time    ALKPHOSPHAT 99 10/06/2020 08:41 AM    ASTSGOT 17 10/06/2020 08:41 AM    ALTSGPT 31 10/06/2020 08:41 AM    TBILIRUBIN 0.9 10/06/2020 08:41 AM        Imaging/Testing:  DX-CHEST-PORTABLE (1 VIEW)   Final Result      Mild hypoinflation without other evidence for acute cardiopulmonary disease.      CT-CTA NECK WITH & W/O-POST PROCESSING   Final Result      1.  Approximately 75% narrowing at the origin of the left internal carotid artery.      2.  Approximately 25% focal stenosis at the origin of the right internal carotid artery.      3.  Patent bilateral vertebral arteries.      4.  Minimal narrowing at the origin of the left subclavian artery.      CT-CTA HEAD WITH & W/O-POST PROCESS   Final Result      CT angiogram of the [place] within normal limits.      CT-HEAD W/O   Final Result      1.  No acute intracranial process.      2.  Atrophy and small vessel ischemic change.      MR-BRAIN-W/O    (Results Pending)         Assessment and Plan:    80-year-old female presented with right-sided weakness and aphasia.  Was not given TPA due to improved symptoms.  And outside window.  CTA head and neck showed a 75% left carotid stenosis, vascular surgeon was consulted.  The patient per the notes was very confused yesterday and said did not want any surgery.  However this morning when spoke to the patient she does not remember speaking to the surgeon yesterday.  She says she is very interested in considering surgery for the left ICA stenosis.  She understands that she is at high risk for further infarcts.  This morning she is awake and alert x4.  She is able to tell me about the upcoming president election.  Appears to be very appropriate.  I will contact Dr. Verma vascular surgery for reconsideration  surgery.  Also get an MRI brain to see when we can start anticoagulation.    Plan:  1.  Urgent MRI brain  2.  Continue Plavix  3.  Reach out to vascular surgery to reconsider emergent CEA  4.  Blood pressures well controlled  5.  Continue statin  6.  Glucotrol per primary recommendations normoglycemia  7.  We will make determination for anticoagulation after MRI    Update.  1:30 PM MRI brain is complete.  Shows multiple tiny embolic infarcts most in the left also few in the right frontal region.  The infarct size is very tiny.  I believe it safe the patient be anticoagulated        The evaluation of the patient, and recommended management, was discussed with the resident staff. I have performed a physical exam and reviewed and updated ROS and Plan today (10/6/2020). In review of yesterday's note (10/5/2020), there are no changes except as documented above.    This chart was partially generated using voice recognition technology and sound alike word replacement may be present, best efforts were made to make the chart accurate.    Sixto Dixon MD  Board Certified Neurology, ABPN  (t) [phone]

## 2020-10-06 NOTE — THERAPY
PT consult received. Pt currently going down for MRI, will reattempt PT evaluation as able. Thanks    Anuradha Stern, PT, DPT Voalte: [phone]

## 2020-10-06 NOTE — PROGRESS NOTES
2 RN Skin check. Suture and bruising present to right middle toe from recent procedure. Rest of skin WNL.

## 2020-10-06 NOTE — PROGRESS NOTES
Hospital Medicine Daily Progress Note    Date of Service  10/6/2020    Chief Complaint  80 y.o. female admitted 10/5/2020 with change in mental status and slurred speech    Hospital Course          Interval Problem Update  Patient has been seen by neurosurgery for her critical carotid stenosis.  As per neurosurgery's last note their recommendation is anticoagulation due to the fact the patient is high risk for surgery and she stated to the MD that she did not want surgery    Neurology is on the case awaiting their clearance for anticoagulation    Patient states that her headache is gone and that her only issue at this time is intermittently having difficulty finding words.    She does have right lower extremity weakness    Afebrile    Blood glucose is elevated uncontrolled    Mri brain pending    Patient referred to rehab today    Consultants/Specialty  Neuro   neurosurg    Code Status  Full Code    Disposition  pending    Review of Systems  Review of Systems   Constitutional: Negative for chills, fever and weight loss.   HENT: Negative for hearing loss and tinnitus.    Eyes: Negative for blurred vision, double vision and photophobia.   Respiratory: Negative for cough, hemoptysis and sputum production.    Cardiovascular: Negative for chest pain, orthopnea and claudication.   Gastrointestinal: Negative for abdominal pain, diarrhea, heartburn, nausea and vomiting.   Genitourinary: Negative for dysuria and urgency.   Neurological: Positive for speech change and headaches.   Psychiatric/Behavioral: Negative.         Physical Exam  Temp:  [35.9 °C (96.7 °F)-36.9 °C (98.5 °F)] 36.2 °C (97.1 °F)  Pulse:  [] 64  Resp:  [16-26] 16  BP: (112-194)/(61-99) 112/61  SpO2:  [90 %-97 %] 92 %    Physical Exam  Constitutional:       General: She is not in acute distress.     Appearance: Normal appearance. She is not ill-appearing, toxic-appearing or diaphoretic.   HENT:      Head: Normocephalic and atraumatic.      Nose: Nose  normal.      Mouth/Throat:      Mouth: Mucous membranes are moist.      Comments: Left facial droop  Eyes:      Extraocular Movements: Extraocular movements intact.      Pupils: Pupils are equal, round, and reactive to light.   Neck:      Musculoskeletal: Normal range of motion and neck supple. No neck rigidity.   Cardiovascular:      Rate and Rhythm: Normal rate and regular rhythm.      Pulses: Normal pulses.      Heart sounds: No murmur. No gallop.    Pulmonary:      Effort: Pulmonary effort is normal. No respiratory distress.      Breath sounds: No wheezing.   Abdominal:      General: Abdomen is flat. There is no distension.      Palpations: There is no mass.      Tenderness: There is no abdominal tenderness. There is no guarding or rebound.      Hernia: No hernia is present.   Musculoskeletal:         General: No swelling, tenderness or deformity.      Right lower leg: No edema.      Left lower leg: No edema.   Lymphadenopathy:      Cervical: No cervical adenopathy.   Skin:     General: Skin is warm.      Capillary Refill: Capillary refill takes 2 to 3 seconds.      Coloration: Skin is not jaundiced or pale.      Findings: No bruising, erythema or lesion.   Neurological:      Mental Status: She is alert and oriented to person, place, and time.      Comments: Power is in the right lower extremity 1 out of 5    Mild expressive aphasia   Psychiatric:         Mood and Affect: Mood normal.         Fluids  No intake or output data in the 24 hours ending 10/06/20 0934    Laboratory  Recent Labs     10/05/20  0815 10/06/20  0841   WBC 5.6 6.3   RBC 4.59 4.95   HEMOGLOBIN 16.1* 17.0*   HEMATOCRIT 47.2* 51.0*   .8* 103.0*   MCH 35.1* 34.3*   MCHC 34.1 33.3*   RDW 47.4 47.0   PLATELETCT 224 219   MPV 11.4 10.6     Recent Labs     10/05/20  0903   SODIUM 133*   POTASSIUM 3.9   CHLORIDE 98   CO2 24   GLUCOSE 324*   BUN 13   CREATININE 0.75   CALCIUM 9.1     Recent Labs     10/05/20  0815   APTT 30.4   INR 0.95                Imaging  DX-CHEST-PORTABLE (1 VIEW)   Final Result      Mild hypoinflation without other evidence for acute cardiopulmonary disease.      CT-CTA NECK WITH & W/O-POST PROCESSING   Final Result      1.  Approximately 75% narrowing at the origin of the left internal carotid artery.      2.  Approximately 25% focal stenosis at the origin of the right internal carotid artery.      3.  Patent bilateral vertebral arteries.      4.  Minimal narrowing at the origin of the left subclavian artery.      CT-CTA HEAD WITH & W/O-POST PROCESS   Final Result      CT angiogram of the [place] within normal limits.      CT-HEAD W/O   Final Result      1.  No acute intracranial process.      2.  Atrophy and small vessel ischemic change.      MR-BRAIN-W/O    (Results Pending)        Assessment/Plan  CVA (Cerebral Vascular Accident), h/o- (present on admission)  Assessment & Plan  Recurrent.  Clopidogrel per home meds.  Patient reportedly has an aspirin allergy.  High-dose atorvastatin.  Allow permissive hypertension.  Neurology consulted, appreciate recommendations.  Manage diabetes as noted.    Type 2 diabetes mellitus with hyperglycemia, without long-term current use of insulin (HCC)- (present on admission)  Assessment & Plan  Hyperglycemia to 400s on admission.  Most recent A1c was 8.8 approximately a year ago.  Recheck A1c is 12  Sliding scale insulin, Accu-Cheks, hypoglycemia protocol.    Lantus 15 units every morning has been initiated on 10/6/2020    Essential hypertension- (present on admission)  Assessment & Plan  Hypertensive on presentation.  Allow permissive hypertension in the setting of CVA.  Holding home medications for above.    Carotid stenosis- (present on admission)  Assessment & Plan  75% narrowing of left ICA per CTA.  Vascular surgery consulted, appreciate recommendations.       VTE prophylaxis: scd    Check am cbc, bmp

## 2020-10-07 ENCOUNTER — ANESTHESIA (OUTPATIENT)
Dept: SURGERY | Facility: MEDICAL CENTER | Age: 80
DRG: 038 | End: 2020-10-07
Payer: MEDICARE

## 2020-10-07 LAB
GLUCOSE BLD-MCNC: 150 MG/DL (ref 65–99)
GLUCOSE BLD-MCNC: 160 MG/DL (ref 65–99)
GLUCOSE BLD-MCNC: 167 MG/DL (ref 65–99)
GLUCOSE BLD-MCNC: 206 MG/DL (ref 65–99)
GLUCOSE BLD-MCNC: 209 MG/DL (ref 65–99)
UFH PPP CHRO-ACNC: 0.5 IU/ML
UFH PPP CHRO-ACNC: 0.67 IU/ML

## 2020-10-07 PROCEDURE — 88300 SURGICAL PATH GROSS: CPT

## 2020-10-07 PROCEDURE — 160036 HCHG PACU - EA ADDL 30 MINS PHASE I: Performed by: SURGERY

## 2020-10-07 PROCEDURE — 99223 1ST HOSP IP/OBS HIGH 75: CPT | Performed by: PHYSICAL MEDICINE & REHABILITATION

## 2020-10-07 PROCEDURE — 700111 HCHG RX REV CODE 636 W/ 250 OVERRIDE (IP): Performed by: SURGERY

## 2020-10-07 PROCEDURE — 03UL0KZ SUPPLEMENT LEFT INTERNAL CAROTID ARTERY WITH NONAUTOLOGOUS TISSUE SUBSTITUTE, OPEN APPROACH: ICD-10-PCS | Performed by: SURGERY

## 2020-10-07 PROCEDURE — 03CL0ZZ EXTIRPATION OF MATTER FROM LEFT INTERNAL CAROTID ARTERY, OPEN APPROACH: ICD-10-PCS | Performed by: SURGERY

## 2020-10-07 PROCEDURE — 160028 HCHG SURGERY MINUTES - 1ST 30 MINS LEVEL 3: Performed by: SURGERY

## 2020-10-07 PROCEDURE — 700101 HCHG RX REV CODE 250

## 2020-10-07 PROCEDURE — 97161 PT EVAL LOW COMPLEX 20 MIN: CPT

## 2020-10-07 PROCEDURE — C1781 MESH (IMPLANTABLE): HCPCS | Performed by: SURGERY

## 2020-10-07 PROCEDURE — A9270 NON-COVERED ITEM OR SERVICE: HCPCS | Performed by: INTERNAL MEDICINE

## 2020-10-07 PROCEDURE — 700102 HCHG RX REV CODE 250 W/ 637 OVERRIDE(OP): Performed by: ANESTHESIOLOGY

## 2020-10-07 PROCEDURE — 99233 SBSQ HOSP IP/OBS HIGH 50: CPT | Performed by: HOSPITALIST

## 2020-10-07 PROCEDURE — 770022 HCHG ROOM/CARE - ICU (200)

## 2020-10-07 PROCEDURE — 501838 HCHG SUTURE GENERAL: Performed by: SURGERY

## 2020-10-07 PROCEDURE — 160048 HCHG OR STATISTICAL LEVEL 1-5: Performed by: SURGERY

## 2020-10-07 PROCEDURE — 03CN0ZZ EXTIRPATION OF MATTER FROM LEFT EXTERNAL CAROTID ARTERY, OPEN APPROACH: ICD-10-PCS | Performed by: SURGERY

## 2020-10-07 PROCEDURE — 500002 HCHG ADHESIVE, DERMABOND: Performed by: SURGERY

## 2020-10-07 PROCEDURE — 160039 HCHG SURGERY MINUTES - EA ADDL 1 MIN LEVEL 3: Performed by: SURGERY

## 2020-10-07 PROCEDURE — C1725 CATH, TRANSLUMIN NON-LASER: HCPCS | Performed by: SURGERY

## 2020-10-07 PROCEDURE — 700101 HCHG RX REV CODE 250: Performed by: SURGERY

## 2020-10-07 PROCEDURE — 82962 GLUCOSE BLOOD TEST: CPT | Mod: 91

## 2020-10-07 PROCEDURE — 03UJ0KZ SUPPLEMENT LEFT COMMON CAROTID ARTERY WITH NONAUTOLOGOUS TISSUE SUBSTITUTE, OPEN APPROACH: ICD-10-PCS | Performed by: SURGERY

## 2020-10-07 PROCEDURE — 700105 HCHG RX REV CODE 258: Performed by: ANESTHESIOLOGY

## 2020-10-07 PROCEDURE — 700111 HCHG RX REV CODE 636 W/ 250 OVERRIDE (IP): Performed by: ANESTHESIOLOGY

## 2020-10-07 PROCEDURE — 501837 HCHG SUTURE CV: Performed by: SURGERY

## 2020-10-07 PROCEDURE — A9270 NON-COVERED ITEM OR SERVICE: HCPCS | Performed by: ANESTHESIOLOGY

## 2020-10-07 PROCEDURE — 160035 HCHG PACU - 1ST 60 MINS PHASE I: Performed by: SURGERY

## 2020-10-07 PROCEDURE — 99233 SBSQ HOSP IP/OBS HIGH 50: CPT | Performed by: PSYCHIATRY & NEUROLOGY

## 2020-10-07 PROCEDURE — 700102 HCHG RX REV CODE 250 W/ 637 OVERRIDE(OP): Performed by: SURGERY

## 2020-10-07 PROCEDURE — 110372 HCHG SHELL REV 278: Performed by: SURGERY

## 2020-10-07 PROCEDURE — 160009 HCHG ANES TIME/MIN: Performed by: SURGERY

## 2020-10-07 PROCEDURE — 85520 HEPARIN ASSAY: CPT | Mod: 91

## 2020-10-07 PROCEDURE — 700102 HCHG RX REV CODE 250 W/ 637 OVERRIDE(OP): Performed by: INTERNAL MEDICINE

## 2020-10-07 PROCEDURE — 160002 HCHG RECOVERY MINUTES (STAT): Performed by: SURGERY

## 2020-10-07 PROCEDURE — 700101 HCHG RX REV CODE 250: Performed by: ANESTHESIOLOGY

## 2020-10-07 PROCEDURE — 85347 COAGULATION TIME ACTIVATED: CPT

## 2020-10-07 PROCEDURE — A9270 NON-COVERED ITEM OR SERVICE: HCPCS | Performed by: SURGERY

## 2020-10-07 PROCEDURE — 03CJ0ZZ EXTIRPATION OF MATTER FROM LEFT COMMON CAROTID ARTERY, OPEN APPROACH: ICD-10-PCS | Performed by: SURGERY

## 2020-10-07 PROCEDURE — 110454 HCHG SHELL REV 250: Performed by: SURGERY

## 2020-10-07 DEVICE — PATCH .8X8CM XENOSURE BIOLOGIC VASCULAR---ORDER IN MULTIPLES OF 5---: Type: IMPLANTABLE DEVICE | Site: NECK | Status: FUNCTIONAL

## 2020-10-07 RX ORDER — CEFAZOLIN SODIUM 1 G/3ML
INJECTION, POWDER, FOR SOLUTION INTRAMUSCULAR; INTRAVENOUS PRN
Status: DISCONTINUED | OUTPATIENT
Start: 2020-10-07 | End: 2020-10-07 | Stop reason: SURG

## 2020-10-07 RX ORDER — LIDOCAINE HYDROCHLORIDE 20 MG/ML
INJECTION, SOLUTION EPIDURAL; INFILTRATION; INTRACAUDAL; PERINEURAL PRN
Status: DISCONTINUED | OUTPATIENT
Start: 2020-10-07 | End: 2020-10-07 | Stop reason: SURG

## 2020-10-07 RX ORDER — ACETAMINOPHEN 500 MG
1000 TABLET ORAL ONCE
Status: COMPLETED | OUTPATIENT
Start: 2020-10-07 | End: 2020-10-07

## 2020-10-07 RX ORDER — BISACODYL 5 MG
10 TABLET, DELAYED RELEASE (ENTERIC COATED) ORAL
Status: DISCONTINUED | OUTPATIENT
Start: 2020-10-07 | End: 2020-10-08 | Stop reason: HOSPADM

## 2020-10-07 RX ORDER — HEPARIN SODIUM,PORCINE 1000/ML
VIAL (ML) INJECTION
Status: DISCONTINUED | OUTPATIENT
Start: 2020-10-07 | End: 2020-10-07 | Stop reason: HOSPADM

## 2020-10-07 RX ORDER — OXYCODONE HCL 5 MG/5 ML
10 SOLUTION, ORAL ORAL
Status: COMPLETED | OUTPATIENT
Start: 2020-10-07 | End: 2020-10-07

## 2020-10-07 RX ORDER — OXYCODONE HYDROCHLORIDE 5 MG/1
2.5 TABLET ORAL
Status: DISCONTINUED | OUTPATIENT
Start: 2020-10-07 | End: 2020-10-08 | Stop reason: HOSPADM

## 2020-10-07 RX ORDER — REMIFENTANIL HYDROCHLORIDE 1 MG/ML
INJECTION, POWDER, LYOPHILIZED, FOR SOLUTION INTRAVENOUS PRN
Status: DISCONTINUED | OUTPATIENT
Start: 2020-10-07 | End: 2020-10-07 | Stop reason: SURG

## 2020-10-07 RX ORDER — SODIUM CHLORIDE, SODIUM LACTATE, POTASSIUM CHLORIDE, CALCIUM CHLORIDE 600; 310; 30; 20 MG/100ML; MG/100ML; MG/100ML; MG/100ML
INJECTION, SOLUTION INTRAVENOUS
Status: DISCONTINUED | OUTPATIENT
Start: 2020-10-07 | End: 2020-10-07 | Stop reason: SURG

## 2020-10-07 RX ORDER — LABETALOL HYDROCHLORIDE 5 MG/ML
INJECTION, SOLUTION INTRAVENOUS PRN
Status: DISCONTINUED | OUTPATIENT
Start: 2020-10-07 | End: 2020-10-07 | Stop reason: SURG

## 2020-10-07 RX ORDER — DEXTROSE MONOHYDRATE 25 G/50ML
50 INJECTION, SOLUTION INTRAVENOUS
Status: DISCONTINUED | OUTPATIENT
Start: 2020-10-07 | End: 2020-10-07 | Stop reason: HOSPADM

## 2020-10-07 RX ORDER — BISACODYL 10 MG
10 SUPPOSITORY, RECTAL RECTAL
Status: DISCONTINUED | OUTPATIENT
Start: 2020-10-07 | End: 2020-10-08 | Stop reason: HOSPADM

## 2020-10-07 RX ORDER — ONDANSETRON 2 MG/ML
4 INJECTION INTRAMUSCULAR; INTRAVENOUS
Status: COMPLETED | OUTPATIENT
Start: 2020-10-07 | End: 2020-10-07

## 2020-10-07 RX ORDER — PROTAMINE SULFATE 10 MG/ML
INJECTION, SOLUTION INTRAVENOUS PRN
Status: DISCONTINUED | OUTPATIENT
Start: 2020-10-07 | End: 2020-10-07 | Stop reason: SURG

## 2020-10-07 RX ORDER — ACETAMINOPHEN 325 MG/1
650 TABLET ORAL EVERY 6 HOURS
Status: DISCONTINUED | OUTPATIENT
Start: 2020-10-07 | End: 2020-10-08 | Stop reason: HOSPADM

## 2020-10-07 RX ORDER — ONDANSETRON 2 MG/ML
INJECTION INTRAMUSCULAR; INTRAVENOUS PRN
Status: DISCONTINUED | OUTPATIENT
Start: 2020-10-07 | End: 2020-10-07 | Stop reason: SURG

## 2020-10-07 RX ORDER — LABETALOL HYDROCHLORIDE 5 MG/ML
5 INJECTION, SOLUTION INTRAVENOUS
Status: DISCONTINUED | OUTPATIENT
Start: 2020-10-07 | End: 2020-10-07 | Stop reason: HOSPADM

## 2020-10-07 RX ORDER — HYDROMORPHONE HYDROCHLORIDE 1 MG/ML
0.1 INJECTION, SOLUTION INTRAMUSCULAR; INTRAVENOUS; SUBCUTANEOUS
Status: DISCONTINUED | OUTPATIENT
Start: 2020-10-07 | End: 2020-10-07 | Stop reason: HOSPADM

## 2020-10-07 RX ORDER — OXYCODONE HCL 5 MG/5 ML
5 SOLUTION, ORAL ORAL
Status: COMPLETED | OUTPATIENT
Start: 2020-10-07 | End: 2020-10-07

## 2020-10-07 RX ORDER — NOREPINEPHRINE BITARTRATE 1 MG/ML
INJECTION, SOLUTION INTRAVENOUS PRN
Status: DISCONTINUED | OUTPATIENT
Start: 2020-10-07 | End: 2020-10-07 | Stop reason: SURG

## 2020-10-07 RX ORDER — HALOPERIDOL 5 MG/ML
INJECTION INTRAMUSCULAR PRN
Status: DISCONTINUED | OUTPATIENT
Start: 2020-10-07 | End: 2020-10-07 | Stop reason: SURG

## 2020-10-07 RX ORDER — HEPARIN SODIUM 5000 [USP'U]/ML
5000 INJECTION, SOLUTION INTRAVENOUS; SUBCUTANEOUS EVERY 8 HOURS
Status: DISCONTINUED | OUTPATIENT
Start: 2020-10-08 | End: 2020-10-08 | Stop reason: HOSPADM

## 2020-10-07 RX ORDER — HYDROMORPHONE HYDROCHLORIDE 1 MG/ML
0.4 INJECTION, SOLUTION INTRAMUSCULAR; INTRAVENOUS; SUBCUTANEOUS
Status: DISCONTINUED | OUTPATIENT
Start: 2020-10-07 | End: 2020-10-07 | Stop reason: HOSPADM

## 2020-10-07 RX ORDER — BUPIVACAINE HYDROCHLORIDE AND EPINEPHRINE 5; 5 MG/ML; UG/ML
INJECTION, SOLUTION EPIDURAL; INTRACAUDAL; PERINEURAL
Status: DISCONTINUED | OUTPATIENT
Start: 2020-10-07 | End: 2020-10-07 | Stop reason: HOSPADM

## 2020-10-07 RX ORDER — HEPARIN SODIUM 1000 [USP'U]/ML
INJECTION, SOLUTION INTRAVENOUS; SUBCUTANEOUS PRN
Status: DISCONTINUED | OUTPATIENT
Start: 2020-10-07 | End: 2020-10-07 | Stop reason: SURG

## 2020-10-07 RX ORDER — HYDROMORPHONE HYDROCHLORIDE 1 MG/ML
0.2 INJECTION, SOLUTION INTRAMUSCULAR; INTRAVENOUS; SUBCUTANEOUS
Status: DISCONTINUED | OUTPATIENT
Start: 2020-10-07 | End: 2020-10-07 | Stop reason: HOSPADM

## 2020-10-07 RX ORDER — ONDANSETRON 2 MG/ML
4 INJECTION INTRAMUSCULAR; INTRAVENOUS EVERY 4 HOURS PRN
Status: DISCONTINUED | OUTPATIENT
Start: 2020-10-07 | End: 2020-10-08 | Stop reason: HOSPADM

## 2020-10-07 RX ORDER — ROCURONIUM BROMIDE 10 MG/ML
INJECTION, SOLUTION INTRAVENOUS PRN
Status: DISCONTINUED | OUTPATIENT
Start: 2020-10-07 | End: 2020-10-07 | Stop reason: SURG

## 2020-10-07 RX ADMIN — SODIUM CHLORIDE, POTASSIUM CHLORIDE, SODIUM LACTATE AND CALCIUM CHLORIDE: 600; 310; 30; 20 INJECTION, SOLUTION INTRAVENOUS at 13:23

## 2020-10-07 RX ADMIN — SUGAMMADEX 200 MG: 100 INJECTION, SOLUTION INTRAVENOUS at 15:44

## 2020-10-07 RX ADMIN — HEPARIN SODIUM 7000 UNITS: 1000 INJECTION, SOLUTION INTRAVENOUS; SUBCUTANEOUS at 14:31

## 2020-10-07 RX ADMIN — OXYCODONE 2.5 MG: 5 TABLET ORAL at 20:37

## 2020-10-07 RX ADMIN — LEVOTHYROXINE SODIUM 75 MCG: 25 TABLET ORAL at 05:19

## 2020-10-07 RX ADMIN — ACETAMINOPHEN 1000 MG: 500 TABLET ORAL at 13:01

## 2020-10-07 RX ADMIN — INSULIN LISPRO 2 UNITS: 100 INJECTION, SOLUTION INTRAVENOUS; SUBCUTANEOUS at 08:05

## 2020-10-07 RX ADMIN — CLOPIDOGREL BISULFATE 75 MG: 75 TABLET ORAL at 05:19

## 2020-10-07 RX ADMIN — ONDANSETRON 4 MG: 2 INJECTION INTRAMUSCULAR; INTRAVENOUS at 15:31

## 2020-10-07 RX ADMIN — ACETAMINOPHEN 650 MG: 325 TABLET, FILM COATED ORAL at 20:00

## 2020-10-07 RX ADMIN — PROPOFOL 80 MG: 10 INJECTION, EMULSION INTRAVENOUS at 13:35

## 2020-10-07 RX ADMIN — PROTAMINE SULFATE 60 MG: 10 INJECTION, SOLUTION INTRAVENOUS at 15:27

## 2020-10-07 RX ADMIN — REMIFENTANIL HYDROCHLORIDE 0.12 MCG/KG/MIN: 1 INJECTION, POWDER, LYOPHILIZED, FOR SOLUTION INTRAVENOUS at 13:30

## 2020-10-07 RX ADMIN — PROPOFOL 50 MG: 10 INJECTION, EMULSION INTRAVENOUS at 15:46

## 2020-10-07 RX ADMIN — CEFAZOLIN 2 G: 330 INJECTION, POWDER, FOR SOLUTION INTRAMUSCULAR; INTRAVENOUS at 13:35

## 2020-10-07 RX ADMIN — PROPOFOL 50 MG: 10 INJECTION, EMULSION INTRAVENOUS at 13:43

## 2020-10-07 RX ADMIN — INSULIN GLARGINE 15 UNITS: 100 INJECTION, SOLUTION SUBCUTANEOUS at 08:05

## 2020-10-07 RX ADMIN — SODIUM CHLORIDE 2 UNITS/HR: 9 INJECTION, SOLUTION INTRAVENOUS at 14:18

## 2020-10-07 RX ADMIN — OXYCODONE HYDROCHLORIDE 5 MG: 5 SOLUTION ORAL at 16:27

## 2020-10-07 RX ADMIN — NOREPINEPHRINE BITARTRATE 0.02 MG: 1 INJECTION, SOLUTION, CONCENTRATE INTRAVENOUS at 13:41

## 2020-10-07 RX ADMIN — OMEPRAZOLE 40 MG: 20 CAPSULE, DELAYED RELEASE ORAL at 05:18

## 2020-10-07 RX ADMIN — ROCURONIUM BROMIDE 30 MG: 10 INJECTION, SOLUTION INTRAVENOUS at 14:33

## 2020-10-07 RX ADMIN — ONDANSETRON 4 MG: 2 INJECTION INTRAMUSCULAR; INTRAVENOUS at 16:13

## 2020-10-07 RX ADMIN — HALOPERIDOL LACTATE 1 MG: 5 INJECTION, SOLUTION INTRAMUSCULAR at 13:35

## 2020-10-07 RX ADMIN — NOREPINEPHRINE BITARTRATE 0.01 MG: 1 INJECTION, SOLUTION, CONCENTRATE INTRAVENOUS at 13:40

## 2020-10-07 RX ADMIN — INSULIN HUMAN 5 UNITS: 100 INJECTION, SOLUTION PARENTERAL at 14:18

## 2020-10-07 RX ADMIN — LIDOCAINE HYDROCHLORIDE 80 MG: 20 INJECTION, SOLUTION EPIDURAL; INFILTRATION; INTRACAUDAL at 13:36

## 2020-10-07 RX ADMIN — PHENYLEPHRINE HYDROCHLORIDE 20 MCG/MIN: 10 INJECTION INTRAVENOUS at 16:44

## 2020-10-07 RX ADMIN — EPHEDRINE SULFATE 10 MG: 50 INJECTION INTRAVENOUS at 16:22

## 2020-10-07 RX ADMIN — LABETALOL HYDROCHLORIDE 20 MG: 5 INJECTION, SOLUTION INTRAVENOUS at 15:46

## 2020-10-07 RX ADMIN — REMIFENTANIL HYDROCHLORIDE 60 MCG: 1 INJECTION, POWDER, LYOPHILIZED, FOR SOLUTION INTRAVENOUS at 13:35

## 2020-10-07 RX ADMIN — ROCURONIUM BROMIDE 50 MG: 10 INJECTION, SOLUTION INTRAVENOUS at 13:36

## 2020-10-07 RX ADMIN — NOREPINEPHRINE BITARTRATE 5 MCG/MIN: 1 INJECTION, SOLUTION, CONCENTRATE INTRAVENOUS at 13:35

## 2020-10-07 RX ADMIN — INSULIN LISPRO 1 UNITS: 100 INJECTION, SOLUTION INTRAVENOUS; SUBCUTANEOUS at 22:32

## 2020-10-07 ASSESSMENT — ENCOUNTER SYMPTOMS
COUGH: 0
SPUTUM PRODUCTION: 0
HEADACHES: 0
HEMOPTYSIS: 0
TINGLING: 0
SENSORY CHANGE: 0
DIARRHEA: 0
PHOTOPHOBIA: 0
TREMORS: 0
CHILLS: 0
CLAUDICATION: 0
VOMITING: 0
SPEECH CHANGE: 0
PSYCHIATRIC NEGATIVE: 1
WEIGHT LOSS: 0
HEARTBURN: 0
FEVER: 0
NAUSEA: 0
BLURRED VISION: 0
ORTHOPNEA: 0
DOUBLE VISION: 0
ABDOMINAL PAIN: 0

## 2020-10-07 ASSESSMENT — PAIN DESCRIPTION - PAIN TYPE
TYPE: SURGICAL PAIN
TYPE: ACUTE PAIN
TYPE: SURGICAL PAIN

## 2020-10-07 ASSESSMENT — COGNITIVE AND FUNCTIONAL STATUS - GENERAL
SUGGESTED CMS G CODE MODIFIER MOBILITY: CI
CLIMB 3 TO 5 STEPS WITH RAILING: A LITTLE
MOBILITY SCORE: 23

## 2020-10-07 ASSESSMENT — PAIN SCALES - GENERAL: PAIN_LEVEL: 4

## 2020-10-07 ASSESSMENT — FIBROSIS 4 INDEX: FIB4 SCORE: 1.14

## 2020-10-07 ASSESSMENT — GAIT ASSESSMENTS
GAIT LEVEL OF ASSIST: SUPERVISED
DISTANCE (FEET): 500
DEVIATION: ANTALGIC

## 2020-10-07 NOTE — PROGRESS NOTES
Progress Note    CC: Headache    HPI:  Ms. White is an 80 y.o. female w/ dementia, still lives at home, who presented 10/5/2020 with right-sided weakness, aphasia, and headache. She has prior hx of stroke/TIA and is on plavix at baseline.      Workup suggested high-grade left carotid stenosis.    10/7 -  To OR today for CEA    ROS:  Mild headache, no visual changes, weakness or dizziness currently.    Physical Exam  General appearance: frail, comfortable, no distress  Psych: answers questions appropriately, cooperative, normal mood and affect  Neuro: AOx3, CN II-XII grossly intact. No obvious facial droop. Moving all extremities. Equal  strength  Neck: full range of motion  Lungs: No inspiratory stridor or wheezing  CV: RRR  Abdomen: Soft, NT/ND  Skin: No rashes  Healing surgical site R foot 2nd toe, stitch in place, ecchymosis, good pulse      Imaging Reviewed:     10/5/2020 CTA: Reported at 75% L carotid stenosis.      I personally reviewed the CT scan and find the stenosis is actually mild by NASCET criteria. There is likely a fractured focal plaque at the bulb. Of more concern, there is a small pedunculated thrombus in the proximal ICA, best seen on series 8, slice 248. The remainder of the distal ICA is diffusely diseased as well, but patent. No signs of hemorrhagic conversion at this time.         Assessment/Plan & Medical Decision-Making  80F w/ hx of CVA/TIA presenting w/ a left-sided CVA w/ aphasia and right-sided weakness. Fractured plaque at the bulb. After discussion w/ the pts family and the pt herself, she does not have dementia and is lucid. We discussed the R/B/A to surgery and she would like to proceed.    L CEA today      Rene Verma MD  Vascular Surgery  [facility]  Office: [phone]

## 2020-10-07 NOTE — CONSULTS
Physical Medicine and Rehabilitation Consultation         Initial Consult      Date of initial consultation: 10/7/2020  Consulting provider: Francisco J Starks  Reason for consultation: assess for acute inpatient rehab appropriateness  LOS: 1 Day(s)    Chief complaint: Francisco J Starks MD    HPI: The patient is a 80 y.o. right hand dominant female with a past medical history of arthritis, stroke, diabetes, fibromyalgia, GERD, genital herpes, hypertension, insomnia, seizure, TIA;  who presented on 10/5/2020  8:13 AM with right-sided weakness.  Patient had a NIH stroke score of 0 on admission, and neurology notes give-way weakness, and speech pathology none consistent with aphasia.  Work-up with CTA found 75% left carotid stenosis, with likely fractured focal plaque in the bulb, which is high risk for further stroke events.  Patient initially was resistant to intervention, but is scheduled for endarterectomy today 10/7/2020.     The patient currently reports that she is doing well.  She just walked independently with PT a full laparotomy unit.  She is looking forward to returning home after her surgery.    THERAPY:  PT: Functional mobility   Pending    OT: ADLs  10/6: Supervision    SLP:   10/5:Recommend downgrade to soft solids as pt reports this her baseline with thin liquids.     Social Hx:  1 SH  1 JOE  With: Daughter and son-in-law who assist with transportation and housekeeping only    Employment: not working   Tobacco: denies   Alcohol: denies   Drugs: denies     IMAGING:  10/6 MRI brain  1.  Multifocal areas of tiny infarcts in the bilateral frontal, and left parietal, temporal and occipital lobes.  2.  Severe chronic microvascular ischemic disease.  3.  Mild cerebral volume loss.    PROCEDURES:  CEA expected today 10/7/2020    PMH:  Past Medical History:   Diagnosis Date   • Anesthesia     woke up during foot surgery   • Arthritis    • Blood transfusion    • Bronchitis 2011   • Carotid stenosis    • CATARACT      repaired   • CVA (cerebral vascular accident) (HCC)    • Diabetes     off metformin diet controlled   • Fibromyalgia    • Fibromyalgia    • GERD (gastroesophageal reflux disease)    • Heart burn    • Herpes genital    • Hiatus hernia syndrome    • HTN    • Hx of transient ischemic attack (TIA)    • Indigestion    • Insomnia    • Migraine    • Pain    • Seizure (HCC)     last seizure 2010   • Thyroid disease    • TIA     multiple   • Unspecified hemorrhagic conditions     on Plavix   • Unspecified urinary incontinence    • Urinary bladder disorder     incontinence       PSH:  Past Surgical History:   Procedure Laterality Date   • HYSTEROSCOPY WITH VIDEO DIAGNOSTIC  9/5/2013    Performed by Portillo Barragan M.D. at SURGERY SAME DAY [facility] ORS   • DILATION AND CURETTAGE  9/5/2013    Performed by Portillo Barragan M.D. at SURGERY SAME DAY [facility] ORS   • OTHER      cataracts   • OTHER ORTHOPEDIC SURGERY     • TONSILLECTOMY         FHX:  Family History   Problem Relation Age of Onset   • Heart Disease Father    • Cancer Maternal Grandmother        Medications:  Current Facility-Administered Medications   Medication Dose   • insulin glargine (Lantus) injection  15 Units   • heparin infusion 25,000 units in 500 mL 0.45% NACL  0-30 Units/kg/hr (Adjusted)   • acetaminophen (TYLENOL) tablet 650 mg  650 mg   • ondansetron (ZOFRAN) syringe/vial injection 4 mg  4 mg   • ondansetron (ZOFRAN ODT) dispertab 4 mg  4 mg   • senna-docusate (PERICOLACE or SENOKOT S) 8.6-50 MG per tablet 2 Tab  2 Tab    And   • polyethylene glycol/lytes (MIRALAX) PACKET 1 Packet  1 Packet    And   • magnesium hydroxide (MILK OF MAGNESIA) suspension 30 mL  30 mL    And   • bisacodyl (DULCOLAX) suppository 10 mg  10 mg   • Pharmacy consult request - Allow for permissive hypertension: SBP up to 220 mmHg/DBP up to 120 mmHg x 48 hours     • atorvastatin (LIPITOR) tablet 40 mg  40 mg   • insulin lispro (HumaLOG) injection  1-6 Units    And   • glucose 4 g  "chewable tablet 16 g  16 g    And   • dextrose 50% (D50W) injection 50 mL  50 mL   • clopidogrel (PLAVIX) tablet 75 mg  75 mg   • levothyroxine (SYNTHROID) tablet 75 mcg  75 mcg   • omeprazole (PRILOSEC) capsule 40 mg  40 mg   • diphenhydrAMINE (BENADRYL) tablet/capsule 25 mg  25 mg       Allergies:  Allergies   Allergen Reactions   • Asa [Aspirin]      GI bleeding   • Ciprofloxacin Rash   • Clindamycin Hives   • Codeine Vomiting   • Food Anxiety     Yellow dye, peanuts and peanut products, all peppers,tomato, potato,wheat, gluten   • Hydrocortisone      Suicidal after Cortisone injection   • Ibuprofen      Abdominal pain   • Imitrex [Sumatriptan Succinate] Vomiting   • Inderal [Propranolol Hcl]      Not sure of reaction   • Lyrica      suicidal   • Mellaril      Psychotic episode   • Other Drug      All mycins,cillins,and cyclines   • Pcn [Penicillins] Hives   • Ropinirole Hcl [Requip] Shortness of Breath   • Shellfish Allergy      headaches   • Sulfa Drugs Itching   • Tagamet [Cimetidine]      Mental confusion       Physical Exam:  Vitals: /78   Pulse 77   Temp 36.2 °C (97.1 °F) (Temporal)   Resp 16   Ht 1.6 m (5' 3\")   Wt 76.3 kg (168 lb 3.4 oz)   SpO2 97%   Gen: NAD  Head:  NC/AT  Eyes/ Nose/ Mouth: PERRLA, moist mucous membranes  Cardio: RRR, no mumurs  Pulm: CTAB, with normal respiratory effort  Abd: Soft NTND, active bowel sounds,   Ext: No peripheral edema. No calf tenderness. No clubbing/cyanosis.    Mental status:  A&Ox4 (person, place, date, situation) answers questions appropriately follows commands  Speech: fluent, no aphasia or dysarthria    CRANIAL NERVES:  2,3: visual acuity grossly intact, PERRL  3,4,6: EOMI bilaterally, no nystagmus or diplopia  5: sensation intact to light touch bilaterally and symmetric  7: no facial asymmetry  8: hearing grossly intact  9,10: symmetric palate elevation  11: SCM/Trapezius strength 5/5 bilaterally  12: tongue protrudes midline      Motor:      Upper " Extremity  Myotome R L   Shoulder flexion C5 5 5   Elbow flexion C5 4/5 5   Wrist extension C6 5 5   Elbow extension C7 5 5   Finger flexion C8 5 5   Finger abduction T1 5 5     Lower Extremity Myotome R L   Hip flexion L2 4/5 5   Knee extension L3 4/5 5   Ankle dorsiflexion L4 5 5   Toe extension L5 5 5   Ankle plantarflexion S1 5 5       Sensory:   intact to light touch through out    DTRs: 2+ in bilateral  brachioradialis, 2+ in bilateral patellar tendons    Tone: no spasticity noted, no cogwheeling noted    Labs: Reviewed and significant for   Recent Labs     10/05/20  0815 10/06/20  0841 10/06/20  1908   RBC 4.59 4.95  --    HEMOGLOBIN 16.1* 17.0*  --    HEMATOCRIT 47.2* 51.0*  --    PLATELETCT 224 219  --    PROTHROMBTM 13.0  --  13.8   APTT 30.4  --  30.9   INR 0.95  --  1.03     Recent Labs     10/05/20  0903 10/06/20  0841   SODIUM 133* 135   POTASSIUM 3.9 3.8   CHLORIDE 98 98   CO2 24 23   GLUCOSE 324* 266*   BUN 13 21   CREATININE 0.75 0.96   CALCIUM 9.1 9.6     Recent Results (from the past 24 hour(s))   ACCU-CHEK GLUCOSE    Collection Time: 10/06/20 11:58 AM   Result Value Ref Range    Glucose - Accu-Ck 314 (H) 65 - 99 mg/dL   ACCU-CHEK GLUCOSE    Collection Time: 10/06/20  5:26 PM   Result Value Ref Range    Glucose - Accu-Ck 218 (H) 65 - 99 mg/dL   aPTT    Collection Time: 10/06/20  7:08 PM   Result Value Ref Range    APTT 30.9 24.7 - 36.0 sec   Prothrombin Time    Collection Time: 10/06/20  7:08 PM   Result Value Ref Range    PT 13.8 12.0 - 14.6 sec    INR 1.03 0.87 - 1.13   Heparin Xa (Unfractionated)    Collection Time: 10/06/20  7:08 PM   Result Value Ref Range    Heparin Xa (UFH) 0.15 IU/mL   ACCU-CHEK GLUCOSE    Collection Time: 10/06/20  9:54 PM   Result Value Ref Range    Glucose - Accu-Ck 253 (H) 65 - 99 mg/dL   Heparin Xa (Unfractionated)    Collection Time: 10/07/20  1:51 AM   Result Value Ref Range    Heparin Xa (UFH) 0.50 IU/mL   ACCU-CHEK GLUCOSE    Collection Time: 10/07/20  7:49 AM    Result Value Ref Range    Glucose - Accu-Ck 206 (H) 65 - 99 mg/dL         ASSESSMENT:  Patient is a 80 y.o. female admitted with right sided symptoms now going for left CEA     [facility] Code / Diagnosis to Support: 0001.2 - Stroke: Right Body Involvement (Left Brain)    Rehabilitation: Impaired ADLs and mobility  Patient unlikely to require IPR.  Still waiting for therapy evaluations, but expect successful discharge home with home health.    Barriers to transfer include: Insurance authorization, TCCs to verify disposition, medical clearance and bed availability     Additional Recommendations:  -Patient does have slight right-sided weakness in the upper and lower extremities but her function has been preserved thus far.  She is currently supervision with OT, and from what she has told me today he is doing very well with PT as well.    -Anticipate successful discharge home under the supportive care of her family with outpatient therapies as needed  -Patient going for CEA today, assuming she has no adverse outcomes, she should be able to discharge home shortly thereafter with home health.    DVT PPX: SCDs      Thank you for allowing us to participate in the care of this patient.     Patient was seen for 82 minutes on unit/floor of which > 50% of time was spent on counseling and coordination of care regarding the above, including prognosis, risk reduction, benefits of treatment, and options for next stage of care.    Rosendo Yarbrough, DO   Physical Medicine and Rehabilitation

## 2020-10-07 NOTE — ANESTHESIA PROCEDURE NOTES
Arterial Line  Performed by: Marylu Umana M.D.  Authorized by: Marylu Umana M.D.     Start Time:  10/7/2020 1:33 PM  Localization: ultrasound guidance  Image captured, interpreted and electronically stored.  Patient Location:  OR  Indication: continuous blood pressure monitoring        Catheter Size:  20 G  Seldinger Technique?: Yes    Laterality:  Left  Site:  Radial artery  Line Secured:  Antimicrobial disc, tape and transparent dressing  Events: patient tolerated procedure well with no complications     Single puncture direct thread under ultrasound guidance without use of through-and-trough technique

## 2020-10-07 NOTE — DIETARY
Nutrition Services: Brief Update    Consult received for diabetes diet education.  Hx of T2DM, most recent A1c was 12.0 10/5/20.  RD attempted to provide education at bedside 10/6, pt was busy with staff.  Pt is off the floor currently for carotid endarterectomy.  RD will f/u with diet education, prior to discharge, as appropriate.    RD will continue to follow.

## 2020-10-07 NOTE — OP REPORT
OPERATIVE REPORT      Patient:  Bailey Lama Destecroethan     Procedure Date:  10/07/20    Indication:  CVA due to left carotid stenosis with fractured plaque and free-floating thrombus at the bulb    Pre-Operative Diagnosis:  Free floating left carotid thrombus  CVA    Post-Operative Diagnosis:  Same    Procedure:  Left Carotid Endarterectomy    Surgeon:  Rene Verma M.D.    Assistant:  Boy Galarza MD    Anesthesia:  General endotracheal    EBL:  50cc    Specimen:  Left carotid plaque    Complications:  None    Findings:  CEA was performed using a shunt. There was friable plaque within the bulb and proximal ICA.. A standard endarterectomy and bovine pericardial patch angioplasty was performed. At the conclusion of the case, the patient was moving all extremities purposefully and the tongue was midline.    Procedure:  Informed consent was obtained from the patient in the pre-operative holding area. All risks, benefits, and alternatives were explained and She elected to proceed. The patient was brought to the operating room and placed on the table in a supine position. General anesthesia was induced, zacarias-operative antibiotics were given, and a time-out was performed verifying the correct site of surgery. The patient was prepped and draped in the usual sterile fashion.    An incision was made in the skin overlying the Left sternocleidomastoid muscle. Bovie was used to deepen the incision and the platysma was divided. The anterior border of the SCM was mobilized and retracted laterally. The internal jugular vein was identified and dissected along its anterior border. Several small venous branches were ligated and divided. The facial vein was identified, ligated, and divided and jugular was retracted laterally with self-retaining retractors.    The common carotid artery was identified, dissected, and encircled with a vessel loop taking care to identify and protect the vagus nerve and avoid  dissection at the bulb. Next, we dissected out the external carotid and superior thyroid arteries which were isolated with vessel loops. The internal carotid artery was then dissected and isolated with a loop. The hypoglossal nerve was identified and gently retracted away from the field.    The patient was given 100U/kg heparin which was allowed to circulate for three minutes prior to checking an ACT. Once the ACT was over 250, the vessels were sequentially clamped. ACTs were checked every 30 minutes and maintained between 250-300 for the duration of the case.    The common carotid, external carotid, and superior thyroid arteries were clamped and a longitudinal arteriotomy was made in the common carotid artery and extended onto the internal carotid artery using Ram scissors. The ICA was allowed to back bleed for several heartbeats prior to clamping.    A shunt was inserted and flow was verified with doppler. An endarterectomy was performed in the standard fashion of the carotid bulb, distal common carotid artery, and internal carotid artery. An eversion technique was used to endarterectomize the external carotid artery. The field was copiously irrigated and the vessels were free of debris. 7-0 prolene interrupted suture was used to tack the distal endpoint.    Patch angioplasty was performed using a bovine pericardial patch which was sutured in place using running 6-0 prolene suture.. Prior to completing the suture line, the shunt was removed and the ICA and ECA were backflushed and the CCA was flushed antegrade. The patch was irrigated and the suture line was secured. The vessels were unclamped sequentially, flushing the common carotid through the external for several heartbeats. The internal carotid was then unclamped.     Protamine was given and hemostasis was obtained. The anterior cervical fascia was reapproximated using 2-0 vicryl suture. The platysma was reapproximated using running 3-0 vicryl suture and  skin was closed with 4-0 monocryl. An occlusive dressing was applied and the patient was recovered from anesthesia and taken to PACU in stable condition. Prior to leaving the operating room she was moving all extremities purposefully and the tongue was midline.    eRne Verma M.D.  Vascular Surgeon  [facility]

## 2020-10-07 NOTE — THERAPY
Physical Therapy   Initial Evaluation     Patient Name: Bailey De La Pazroethan  Age:  80 y.o., Sex:  female  Medical Record #: [id number]  Today's Date: 10/7/2020     Precautions: (P) Fall Risk, Swallow Precautions ( See Comments)    Assessment  Patient is 80 y.o. female admitted possible stroke. Pt presents to acute physical therapy at baseline. PLOF. Demonstrated all mobility at SPV or Mod I level. Did become fatigued toward end of 500 ft ambulation requiring HHA, which pt reports dtr provides when they ambulate in the community. Pt with no further acute PT needs at this time. Pt reports she will be undergoing CEA this afternoon. Should pt's status change following procedure feel free to re consult PT to reassess.      Plan    Recommend Physical Therapy for Evaluation only    DC Equipment Recommendations: recommend SPC or quad cane based on pt's preference  Discharge Recommendations: Recommend home health for continued physical therapy services to address needs in home environment and optimize function based on QOL goals.        10/07/20 1045   Precautions   Precautions Fall Risk;Swallow Precautions ( See Comments)   Vitals   O2 Delivery Device None - Room Air   Pain 0 - 10 Group   Therapist Pain Assessment During Activity;Nurse Notified  (no pain reported)   Prior Living Situation   Prior Services Home-Independent   Housing / Facility 1 Story Apartment / Condo   Steps Into Home 1   Steps In Home 0   Equipment Owned Quad Cane  (reports having loftstrand crutches with quad point)   Lives with - Patient's Self Care Capacity Adult Children  (Dtr and MICHELLE)   Prior Level of Functional Mobility   Bed Mobility Independent   Transfer Status Independent   Ambulation Independent   Distance Ambulation (Feet)   (limited community)   Assistive Devices Used None  (in home, Dtr's arm or shopping cart in community)   Stairs Independent   Comments reports she mostly stays to her room at home, does walks around home 2x/day. Does  access community with Dtrs support but does not regularly go for walks in the community as she feels she probably should   Cognition    Level of Consciousness Alert   Comments pleasant and cooperative   Active ROM Lower Body    Active ROM Lower Body  WDL   Strength Lower Body   Lower Body Strength  WDL   Balance Assessment   Sitting Balance (Static) Good   Sitting Balance (Dynamic) Good   Standing Balance (Static) Fair +   Standing Balance (Dynamic) Fair +   Weight Shift Sitting Good   Weight Shift Standing Good   Comments w/o AD for majority of time, did require UE support to complete ambulation due to fatigue   Gait Analysis   Gait Level Of Assist Supervised   Assistive Device None   Distance (Feet) 500  (total, provided with HHA for final 60 ft due to fatigue)   # of Times Distance was Traveled 1   Deviation Antalgic  (due to baseline leg length discrepency following L TKA)   # of Stairs Climbed 0   Weight Bearing Status no restrictions   Comments tolerated well, reports much further distance than she is used to walking. did become fatigued by end of ambulation requiring assist which Dtr provides to her at baseline   Bed Mobility    Supine to Sit Modified Independent   Sit to Supine Modified Independent   Scooting Modified Independent   Functional Mobility   Sit to Stand Supervised   Bed, Chair, Wheelchair Transfer Supervised

## 2020-10-07 NOTE — DISCHARGE PLANNING
Anticipated Discharge Disposition:   Home with Home Health    Action:    Pt admitted with hx of CVA, type 2 DM, essential HTN, carotid stenosis.    RN CM spoke to patient and daughter Scott.  Pt stated she lives with her daughter and son-in-law in a single story condominium.  She does not use DME and has been independent except driving.      OT/SLP no needs.  PT pending.    Pt understands she has a narrowing in left carotid artery and does want the surgery.      Pt and daughter stated they prefer home with home health as first disposition plan.    Barriers to Discharge:    Medical clearance    Plan:    Provide transitional care coordination.    Care Transition Team Assessment    Information Source  Orientation : Oriented x 4  Information Given By: Patient, Relative  Informant's Name: Brennon and Scott  Who is responsible for making decisions for patient? : Patient    Readmission Evaluation  Is this a readmission?: No    Elopement Risk  Legal Hold: No  Ambulatory or Self Mobile in Wheelchair: No-Not an Elopement Risk  Elopement Risk: Not at Risk for Elopement    Interdisciplinary Discharge Planning  Lives with - Patient's Self Care Capacity: Adult Children  Patient or legal guardian wants to designate a caregiver: No  Housing / Facility: 1 Story Apartment / Condo  Prior Services: Home-Independent    Discharge Preparedness  What is your plan after discharge?: Home with help, Home health care  What are your discharge supports?: Child, Other (comment)  Prior Functional Level: Ambulatory, Independent with Activities of Daily Living, Independent with Medication Management  Difficulity with ADLs: None  Difficulity with IADLs: Cooking, Driving, Laundry, Managing medication, Shopping    Functional Assesment  Prior Functional Level: Ambulatory, Independent with Activities of Daily Living, Independent with Medication Management    Finances  Financial Barriers to Discharge: No  Prescription Coverage: Yes              Advance  Directive  Advance Directive?: None    Domestic Abuse  Have you ever been the victim of abuse or violence?: No  Physical Abuse or Sexual Abuse: No  Verbal Abuse or Emotional Abuse: No  Possible Abuse/Neglect Reported to:: Not Applicable         Discharge Risks or Barriers  Discharge risks or barriers?: No    Anticipated Discharge Information  Discharge Disposition: Still a Patient (30)  Discharge Contact Phone Number: [phone]

## 2020-10-07 NOTE — CARE PLAN
Problem: Venous Thromboembolism (VTW)/Deep Vein Thrombosis (DVT) Prevention:  Goal: Patient will participate in Venous Thrombosis (VTE)/Deep Vein Thrombosis (DVT)Prevention Measures  Outcome: PROGRESSING AS EXPECTED  Flowsheets (Taken 10/6/2020 1900)  Risk Assessment Score: 1  VTE RISK: Moderate  Mechanical Prophylaxis: SCDs, Sequential Compression Device  SCDs, Sequential Compression Device: On  Pharmacologic Prophylaxis Used: (heparin drip) --  Note: Pt. is currently on heparin drip r/t occlusion of carotid artery.      Problem: Knowledge Deficit  Goal: Knowledge of disease process/condition, treatment plan, diagnostic tests, and medications will improve  Outcome: PROGRESSING AS EXPECTED  Note: Discussed POC and treatment plan with pt. Pt. asked questions about her endarterectomy and heparin drip, all questions answered.       Problem: Communication  Goal: The ability to communicate needs accurately and effectively will improve  Outcome: MET  Note: Pt. is A&Ox4 and no longer confused. She has clear mentation and is able to clearly express her need and wants.

## 2020-10-07 NOTE — FACE TO FACE
Face to Face Supporting Documentation - Home Health    The encounter with this patient was in whole or in part the primary reason for home health admission.    Date of encounter:   Patient:                    MRN:                       YOB: 2022  Bailey White  [id number]  1940     Home health to see patient for:  Skilled Nursing care for assessment, interventions & education, Physical Therapy evaluation and treatment and Occupational therapy evaluation and treatment    Skilled need for:  New Onset Medical Diagnosis stroke    Skilled nursing interventions to include:  Comment: medications,PT, OT    Homebound status evidenced by:  Need the aid of supportive devices such as crutches, canes, wheelchairs or walkers. Leaving home requires a considerable and taxing effort. There is a normal inability to leave the home.    Community Physician to provide follow up care: RANDY Barfield     Optional Interventions? No      I certify the face to face encounter for this home health care referral meets the CMS requirements and the encounter/clinical assessment with the patient was, in whole, or in part, for the medical condition(s) listed above, which is the primary reason for home health care. Based on my clinical findings: the service(s) are medically necessary, support the need for home health care, and the homebound criteria are met.  I certify that this patient has had a face to face encounter by myself.  Francisco J Starks M.D. - NPI: [id number]

## 2020-10-07 NOTE — DISCHARGE PLANNING
Brennon has [facility] Adv for her medical provider.  Unfortunately, [facility] is not a benefit of said provider.  Physiatry to consult to contribute to POC only.  TCC will no longer follow.  Please reach out to myself @ [phone] with any questions.

## 2020-10-07 NOTE — ANESTHESIA PROCEDURE NOTES
Airway    Date/Time: 10/7/2020 1:37 PM  Performed by: Marylu Umana M.D.  Authorized by: Marylu Umana M.D.     Location:  OR  Urgency:  Elective  Indications for Airway Management:  Anesthesia      Spontaneous Ventilation: absent    Sedation Level:  Deep  Preoxygenated: Yes    Patient Position:  Sniffing  Mask Difficulty Assessment:  0 - not attempted  Final Airway Type:  Endotracheal airway  Final Endotracheal Airway:  ETT  Cuffed: Yes    Technique Used for Successful ETT Placement:  Direct laryngoscopy  Devices/Methods Used in Placement:  Intubating stylet    Insertion Site:  Oral  Blade Type:  Britt  Laryngoscope Blade/Videolaryngoscope Blade Size:  3  ETT Size (mm):  6.5  Leak Pressue (cm H2O):  35  Measured from:  Gums  ETT to Gums (cm):  21  Placement Verified by: auscultation and capnometry    Cormack-Lehane Classification:  Grade I - full view of glottis  Number of Attempts at Approach:  1

## 2020-10-07 NOTE — PROGRESS NOTES
Monitor summary: SR 69-81, NH 0.14, QRS 0.08, QT 0.44, with rare PVCs per strip from monitor room.

## 2020-10-07 NOTE — PROGRESS NOTES
Hospital Medicine Daily Progress Note    Date of Service  10/7/2020    Chief Complaint  80 y.o. female admitted 10/5/2020 with change in mental status and slurred speech    Hospital Course          Interval Problem Update  Patient has been seen by neurosurgery for her critical carotid stenosis.  Patient has agreed for carotid enterectomy and plan is for surgery today    Case discussed with neurology Dr. Dixon who is agreed for weightbase heparin    Patient speech is better    She does have right lower extremity weakness which is improved    Afebrile    Blood glucose is elevated uncontrolled.. But slightly better than yesterday    Mri brain --->Multifocal areas of tiny infarcts in the bilateral frontal, and left parietal, temporal and occipital lobes.        Consultants/Specialty  Neuro   neurosurg    Code Status  Full Code    Disposition  Rehab when medically cleared    Review of Systems  Review of Systems   Constitutional: Negative for chills, fever and weight loss.   HENT: Negative for hearing loss and tinnitus.    Eyes: Negative for blurred vision, double vision and photophobia.   Respiratory: Negative for cough, hemoptysis and sputum production.    Cardiovascular: Negative for chest pain, orthopnea and claudication.   Gastrointestinal: Negative for abdominal pain, diarrhea, heartburn, nausea and vomiting.   Genitourinary: Negative for dysuria and urgency.   Neurological: Negative for tingling, tremors, sensory change, speech change and headaches.   Psychiatric/Behavioral: Negative.         Physical Exam  Temp:  [35.8 °C (96.5 °F)-36.2 °C (97.2 °F)] 36.2 °C (97.1 °F)  Pulse:  [67-91] 77  Resp:  [16] 16  BP: (116-139)/(62-80) 133/78  SpO2:  [93 %-98 %] 97 %    Physical Exam  Constitutional:       General: She is not in acute distress.     Appearance: Normal appearance. She is not ill-appearing, toxic-appearing or diaphoretic.   HENT:      Head: Normocephalic and atraumatic.      Nose: Nose normal.       Mouth/Throat:      Mouth: Mucous membranes are moist.      Comments: Left facial droop  Eyes:      Extraocular Movements: Extraocular movements intact.      Pupils: Pupils are equal, round, and reactive to light.   Neck:      Musculoskeletal: Normal range of motion and neck supple. No neck rigidity.   Cardiovascular:      Rate and Rhythm: Normal rate and regular rhythm.      Pulses: Normal pulses.      Heart sounds: No murmur. No gallop.    Pulmonary:      Effort: Pulmonary effort is normal. No respiratory distress.      Breath sounds: No wheezing.   Abdominal:      General: Abdomen is flat. There is no distension.      Palpations: There is no mass.      Tenderness: There is no abdominal tenderness. There is no guarding or rebound.      Hernia: No hernia is present.   Musculoskeletal:         General: No swelling, tenderness or deformity.      Right lower leg: No edema.      Left lower leg: No edema.   Lymphadenopathy:      Cervical: No cervical adenopathy.   Skin:     General: Skin is warm.      Capillary Refill: Capillary refill takes 2 to 3 seconds.      Coloration: Skin is not jaundiced or pale.      Findings: No bruising, erythema or lesion.   Neurological:      Mental Status: She is alert and oriented to person, place, and time.      Comments: Power is in the right lower extremity 4 out of 5       Psychiatric:         Mood and Affect: Mood normal.         Fluids  No intake or output data in the 24 hours ending 10/07/20 0958    Laboratory  Recent Labs     10/05/20  0815 10/06/20  0841   WBC 5.6 6.3   RBC 4.59 4.95   HEMOGLOBIN 16.1* 17.0*   HEMATOCRIT 47.2* 51.0*   .8* 103.0*   MCH 35.1* 34.3*   MCHC 34.1 33.3*   RDW 47.4 47.0   PLATELETCT 224 219   MPV 11.4 10.6     Recent Labs     10/05/20  0903 10/06/20  0841   SODIUM 133* 135   POTASSIUM 3.9 3.8   CHLORIDE 98 98   CO2 24 23   GLUCOSE 324* 266*   BUN 13 21   CREATININE 0.75 0.96   CALCIUM 9.1 9.6     Recent Labs     10/05/20  0815 10/06/20  1908    APTT 30.4 30.9   INR 0.95 1.03         Recent Labs     10/06/20  0841   TRIGLYCERIDE 115   HDL 54   LDL 89       Imaging  MR-BRAIN-W/O   Final Result      1.  Multifocal areas of tiny infarcts in the bilateral frontal, and left parietal, temporal and occipital lobes.   2.  Severe chronic microvascular ischemic disease.   3.  Mild cerebral volume loss.      DX-CHEST-PORTABLE (1 VIEW)   Final Result      Mild hypoinflation without other evidence for acute cardiopulmonary disease.      CT-CTA NECK WITH & W/O-POST PROCESSING   Final Result      1.  Approximately 75% narrowing at the origin of the left internal carotid artery.      2.  Approximately 25% focal stenosis at the origin of the right internal carotid artery.      3.  Patent bilateral vertebral arteries.      4.  Minimal narrowing at the origin of the left subclavian artery.      CT-CTA HEAD WITH & W/O-POST PROCESS   Final Result      CT angiogram of the [place] within normal limits.      CT-HEAD W/O   Final Result      1.  No acute intracranial process.      2.  Atrophy and small vessel ischemic change.           Assessment/Plan  CVA (Cerebral Vascular Accident), h/o- (present on admission)  Assessment & Plan  Recurrent.  Clopidogrel per home meds.    Weight-based heparin   patient reportedly has an aspirin allergy.  High-dose atorvastatin.  Neurology consulted, appreciate recommendations.  Manage diabetes as noted.    Type 2 diabetes mellitus with hyperglycemia, without long-term current use of insulin (HCC)- (present on admission)  Assessment & Plan  Hyperglycemia to 400s on admission.  Most recent A1c was 8.8 approximately a year ago.  Recheck A1c is 12  Sliding scale insulin, Accu-Cheks, hypoglycemia protocol.    Lantus 15 units every morning has been initiated on 10/6/2020    No change for  today as patient is going for surgery and reassess for adjustment of Lantus tomorrow    Essential hypertension- (present on admission)  Assessment &  Plan  Hypertensive on presentation.    Attempt to control blood pressure post surgery    Resume home medications    Carotid stenosis- (present on admission)  Assessment & Plan  75% narrowing of left ICA per CTA.      Weight-based heparin    Carotid enterectomy planned for today       VTE prophylaxis: scd    Check am cbc, bmp

## 2020-10-07 NOTE — ANESTHESIA TIME REPORT
Anesthesia Start and Stop Event Times     Date Time Event    10/7/2020 1300 Ready for Procedure     1323 Anesthesia Start     1557 Anesthesia Stop        Responsible Staff  10/07/20    Name Role Begin End    Marylu Umana M.D. Anesth 1323 1557        Preop Diagnosis (Free Text):  Pre-op Diagnosis     High-grade left carotid stenosis        Preop Diagnosis (Codes):    Post op Diagnosis  Carotid stenosis, symptomatic, with infarction (HCC)      Premium Reason  A. 3PM - 7AM    Comments:

## 2020-10-07 NOTE — PROGRESS NOTES
Monitor summary: SR 68-80, TN 0.16, QRS 0.10, QT 0.42 vwith rare PVCs per strip from monitor room.

## 2020-10-07 NOTE — ANESTHESIA PREPROCEDURE EVALUATION
80F s/p CVA w/carotid stenosis here for carotid endarterectomy    Allergies   Allergen Reactions   • Asa [Aspirin]      GI bleeding   • Ciprofloxacin Rash   • Clindamycin Hives   • Codeine Vomiting   • Food Anxiety     Yellow dye, peanuts and peanut products, all peppers,tomato, potato,wheat, gluten   • Hydrocortisone      Suicidal after Cortisone injection   • Ibuprofen      Abdominal pain   • Imitrex [Sumatriptan Succinate] Vomiting   • Inderal [Propranolol Hcl]      Not sure of reaction   • Lyrica      suicidal   • Mellaril      Psychotic episode   • Other Drug      All mycins,cillins,and cyclines   • Pcn [Penicillins] Hives   • Ropinirole Hcl [Requip] Shortness of Breath   • Shellfish Allergy      headaches   • Sulfa Drugs Itching   • Tagamet [Cimetidine]      Mental confusion      Relevant Problems   NEURO   (+) CVA (Cerebral Vascular Accident), h/o      CARDIAC   (+) Carotid stenosis   (+) Essential hypertension      GI   (+) Gastroesophageal reflux disease with esophagitis      ENDO   (+) Hypothyroidism   (+) Type 2 diabetes mellitus with hyperglycemia, without long-term current use of insulin (HCC)      Other   (+) Chronic bilateral low back pain without sciatica   (+) Fibromyalgia   (+) Gastroparesis   (+) Ringing in ears, right     Past Surgical History:   Procedure Laterality Date   • HYSTEROSCOPY WITH VIDEO DIAGNOSTIC  9/5/2013    Performed by Portillo Barragan M.D. at SURGERY SAME DAY [facility]   • DILATION AND CURETTAGE  9/5/2013    Performed by Portillo Barragan M.D. at SURGERY SAME DAY [facility]   • OTHER      cataracts   • OTHER ORTHOPEDIC SURGERY     • TONSILLECTOMY        Vitals:    10/07/20 1155   BP: 131/76   Pulse: 83   Resp: 16   Temp: 36.1 °C (96.9 °F)   SpO2: 92%      Physical Exam    Airway   Mallampati: II  TM distance: <3 FB  Neck ROM: full       Cardiovascular - normal exam     Dental   (+) upper dentures, lower dentures           Pulmonary - normal exam     Abdominal     Neurological - normal exam                 Anesthesia Plan    ASA 4   ASA physical status 4 criteria: CVA or TIA - recent (< 3 months)    Plan - general       Airway plan will be ETT      Plan Factors:   Patient was previously instructed to abstain from smoking on day of procedure.  Patient did not smoke on day of procedure.      Induction: intravenous    Postoperative Plan: Postoperative administration of opioids is intended.    Pertinent diagnostic labs and testing reviewed    Informed Consent:    Anesthetic plan and risks discussed with patient.    Use of blood products discussed with: patient whom consented to blood products.

## 2020-10-08 ENCOUNTER — HOSPITAL ENCOUNTER (INPATIENT)
Facility: REHABILITATION | Age: 80
End: 2020-10-08
Attending: PHYSICAL MEDICINE & REHABILITATION | Admitting: PHYSICAL MEDICINE & REHABILITATION
Payer: MEDICARE

## 2020-10-08 VITALS
HEART RATE: 77 BPM | OXYGEN SATURATION: 94 % | SYSTOLIC BLOOD PRESSURE: 136 MMHG | DIASTOLIC BLOOD PRESSURE: 59 MMHG | BODY MASS INDEX: 30.04 KG/M2 | WEIGHT: 169.53 LBS | TEMPERATURE: 97.8 F | RESPIRATION RATE: 38 BRPM | HEIGHT: 63 IN

## 2020-10-08 LAB
ACT BLD: 246 SEC (ref 74–137)
ACT BLD: 296 SEC (ref 74–137)
ANION GAP SERPL CALC-SCNC: 13 MMOL/L (ref 7–16)
BUN SERPL-MCNC: 17 MG/DL (ref 8–22)
CALCIUM SERPL-MCNC: 8.3 MG/DL (ref 8.5–10.5)
CHLORIDE SERPL-SCNC: 102 MMOL/L (ref 96–112)
CO2 SERPL-SCNC: 22 MMOL/L (ref 20–33)
CREAT SERPL-MCNC: 0.7 MG/DL (ref 0.5–1.4)
ERYTHROCYTE [DISTWIDTH] IN BLOOD BY AUTOMATED COUNT: 46.6 FL (ref 35.9–50)
GLUCOSE BLD-MCNC: 143 MG/DL (ref 65–99)
GLUCOSE BLD-MCNC: 161 MG/DL (ref 65–99)
GLUCOSE BLD-MCNC: 164 MG/DL (ref 65–99)
GLUCOSE BLD-MCNC: 168 MG/DL (ref 65–99)
GLUCOSE BLD-MCNC: 190 MG/DL (ref 65–99)
GLUCOSE SERPL-MCNC: 144 MG/DL (ref 65–99)
HCT VFR BLD AUTO: 39.5 % (ref 37–47)
HGB BLD-MCNC: 13.2 G/DL (ref 12–16)
MCH RBC QN AUTO: 34.6 PG (ref 27–33)
MCHC RBC AUTO-ENTMCNC: 33.6 G/DL (ref 33.6–35)
MCV RBC AUTO: 103.1 FL (ref 81.4–97.8)
PATHOLOGY CONSULT NOTE: NORMAL
PLATELET # BLD AUTO: 176 K/UL (ref 164–446)
PMV BLD AUTO: 10.6 FL (ref 9–12.9)
POTASSIUM SERPL-SCNC: 3.8 MMOL/L (ref 3.6–5.5)
RBC # BLD AUTO: 3.81 M/UL (ref 4.2–5.4)
SODIUM SERPL-SCNC: 137 MMOL/L (ref 135–145)
WBC # BLD AUTO: 8.5 K/UL (ref 4.8–10.8)

## 2020-10-08 PROCEDURE — 85027 COMPLETE CBC AUTOMATED: CPT

## 2020-10-08 PROCEDURE — A9270 NON-COVERED ITEM OR SERVICE: HCPCS | Performed by: PHYSICIAN ASSISTANT

## 2020-10-08 PROCEDURE — 700102 HCHG RX REV CODE 250 W/ 637 OVERRIDE(OP): Performed by: SURGERY

## 2020-10-08 PROCEDURE — 80048 BASIC METABOLIC PNL TOTAL CA: CPT

## 2020-10-08 PROCEDURE — 700111 HCHG RX REV CODE 636 W/ 250 OVERRIDE (IP): Performed by: SURGERY

## 2020-10-08 PROCEDURE — A9270 NON-COVERED ITEM OR SERVICE: HCPCS | Performed by: INTERNAL MEDICINE

## 2020-10-08 PROCEDURE — 99232 SBSQ HOSP IP/OBS MODERATE 35: CPT | Performed by: PSYCHIATRY & NEUROLOGY

## 2020-10-08 PROCEDURE — A9270 NON-COVERED ITEM OR SERVICE: HCPCS | Performed by: SURGERY

## 2020-10-08 PROCEDURE — 700102 HCHG RX REV CODE 250 W/ 637 OVERRIDE(OP): Performed by: PHYSICIAN ASSISTANT

## 2020-10-08 PROCEDURE — 700102 HCHG RX REV CODE 250 W/ 637 OVERRIDE(OP): Performed by: INTERNAL MEDICINE

## 2020-10-08 PROCEDURE — 82962 GLUCOSE BLOOD TEST: CPT | Mod: 91

## 2020-10-08 RX ORDER — AMLODIPINE BESYLATE 5 MG/1
2.5 TABLET ORAL
Status: DISCONTINUED | OUTPATIENT
Start: 2020-10-08 | End: 2020-10-08 | Stop reason: HOSPADM

## 2020-10-08 RX ORDER — ATORVASTATIN CALCIUM 40 MG/1
40 TABLET, FILM COATED ORAL EVERY EVENING
Qty: 30 TAB | Refills: 1 | Status: SHIPPED | OUTPATIENT
Start: 2020-10-08 | End: 2021-02-11 | Stop reason: SDUPTHER

## 2020-10-08 RX ORDER — AMLODIPINE BESYLATE 2.5 MG/1
2.5 TABLET ORAL DAILY
Qty: 30 TAB | Refills: 1 | Status: SHIPPED | OUTPATIENT
Start: 2020-10-08 | End: 2020-11-16 | Stop reason: SDUPTHER

## 2020-10-08 RX ADMIN — ACETAMINOPHEN 650 MG: 325 TABLET, FILM COATED ORAL at 00:12

## 2020-10-08 RX ADMIN — LEVOTHYROXINE SODIUM 75 MCG: 25 TABLET ORAL at 06:07

## 2020-10-08 RX ADMIN — AMLODIPINE BESYLATE 2.5 MG: 5 TABLET ORAL at 09:11

## 2020-10-08 RX ADMIN — INSULIN LISPRO 1 UNITS: 100 INJECTION, SOLUTION INTRAVENOUS; SUBCUTANEOUS at 12:19

## 2020-10-08 RX ADMIN — ACETAMINOPHEN 650 MG: 325 TABLET, FILM COATED ORAL at 12:15

## 2020-10-08 RX ADMIN — OMEPRAZOLE 40 MG: 20 CAPSULE, DELAYED RELEASE ORAL at 06:07

## 2020-10-08 RX ADMIN — INSULIN LISPRO 1 UNITS: 100 INJECTION, SOLUTION INTRAVENOUS; SUBCUTANEOUS at 07:52

## 2020-10-08 RX ADMIN — ACETAMINOPHEN 650 MG: 325 TABLET, FILM COATED ORAL at 06:07

## 2020-10-08 RX ADMIN — CLOPIDOGREL BISULFATE 75 MG: 75 TABLET ORAL at 06:06

## 2020-10-08 RX ADMIN — INSULIN GLARGINE 15 UNITS: 100 INJECTION, SOLUTION SUBCUTANEOUS at 06:16

## 2020-10-08 RX ADMIN — HEPARIN SODIUM 5000 UNITS: 5000 INJECTION, SOLUTION INTRAVENOUS; SUBCUTANEOUS at 06:06

## 2020-10-08 RX ADMIN — DOCUSATE SODIUM 50 MG AND SENNOSIDES 8.6 MG 2 TABLET: 8.6; 5 TABLET, FILM COATED ORAL at 06:06

## 2020-10-08 ASSESSMENT — PAIN DESCRIPTION - PAIN TYPE
TYPE: ACUTE PAIN

## 2020-10-08 NOTE — DISCHARGE INSTRUCTIONS
Discharge Instructions    Discharged to home by car with relative. Discharged via wheelchair, hospital escort: Refused.  Special equipment needed: Not Applicable    Be sure to schedule a follow-up appointment with your primary care doctor or any specialists as instructed.     Discharge Plan: Home with daughter and home health.        I understand that a diet low in cholesterol, fat, and sodium is recommended for good health. Unless I have been given specific instructions below for another diet, I accept this instruction as my diet prescription.   Other diet: Diabetic, cardiac      Special Instructions: None    · Is patient discharged on Warfarin / Coumadin?   No     Depression / Suicide Risk    As you are discharged from this [facility] facility, it is important to learn how to keep safe from harming yourself.    Recognize the warning signs:  · Abrupt changes in personality, positive or negative- including increase in energy   · Giving away possessions  · Change in eating patterns- significant weight changes-  positive or negative  · Change in sleeping patterns- unable to sleep or sleeping all the time   · Unwillingness or inability to communicate  · Depression  · Unusual sadness, discouragement and loneliness  · Talk of wanting to die  · Neglect of personal appearance   · Rebelliousness- reckless behavior  · Withdrawal from people/activities they love  · Confusion- inability to concentrate     If you or a loved one observes any of these behaviors or has concerns about self-harm, here's what you can do:  · Talk about it- your feelings and reasons for harming yourself  · Remove any means that you might use to hurt yourself (examples: pills, rope, extension cords, firearm)  · Get professional help from the community (Mental Health, Substance Abuse, psychological counseling)  · Do not be alone:Call your Safe Contact- someone whom you trust who will be there for you.  · Call your local CRISIS HOTLINE 910-2988 or  391-123-7046  · Call your local Children's Mobile Crisis Response Team [place] (971) 669-9697 or www.[employer].Ifeelgoods  · Call the toll free National Suicide Prevention Hotlines   · National Suicide Prevention Lifeline 619-398-VWRF (3767)  · National [employer] Line Network 800-SUICIDE (883-7317)

## 2020-10-08 NOTE — DISCHARGE PLANNING
Ongoing:     Action:   · Per chart review, Karissa  accepted the Pt   · Bedside RN updated     Plan:   · Patient to discharge today with [facility]   · Hospital Care Management Team to continue to provide support services and assistance with discharge planning as needed.

## 2020-10-08 NOTE — PROGRESS NOTES
Neurology Progress Note  Neurohospitalist Service, [facility]    Referring Physician: Francisco J Starks M.D.    Chief Complaint   Patient presents with   • Possible Stroke       HPI: Refer to initial documented Neurology H&P, as detailed in the patient's chart.    Interval History October 8, 2020: Left CEA yesterday no complications.  Patient feels back to baseline wants to go home.    Review of systems: In addition to what is detailed in the HPI and/or updated in the interval history, all other systems reviewed and are negative.    Past Medical History:    has a past medical history of Anesthesia, Arthritis, Blood transfusion, Bronchitis (2011), Carotid stenosis, CATARACT, CVA (cerebral vascular accident) (HCC), Diabetes, Fibromyalgia, Fibromyalgia, GERD (gastroesophageal reflux disease), Heart burn, Herpes genital, Hiatus hernia syndrome, HTN, transient ischemic attack (TIA), Indigestion, Insomnia, Migraine, Pain, Seizure (HCC), Thyroid disease, TIA, Unspecified hemorrhagic conditions, Unspecified urinary incontinence, and Urinary bladder disorder. She also has no past medical history of ASTHMA or Breast cancer ([facility]).    FHx:  family history includes Cancer in her maternal grandmother; Heart Disease in her father.    SHx:   reports that she quit smoking about 65 years ago. Her smoking use included cigarettes. She has a 3.00 pack-year smoking history. She has never used smokeless tobacco. She reports that she does not drink alcohol or use drugs.    Medications:    Current Facility-Administered Medications:   •  amLODIPine (NORVASC) tablet 2.5 mg, 2.5 mg, Oral, Q DAY, Yady Hernandez P.A.-C.  •  Pharmacy Consult Request ...Pain Management Review 1 Each, 1 Each, Other, PHARMACY TO DOSE, Rene Verma M.D.  •  ondansetron (ZOFRAN) syringe/vial injection 4 mg, 4 mg, Intravenous, Q4HRS PRN, Rene Verma M.D.  •  heparin injection 5,000 Units, 5,000 Units, Subcutaneous, Q8HRS, Rene Verma M.D.,  5,000 Units at 10/08/20 0606  •  acetaminophen (TYLENOL) tablet 650 mg, 650 mg, Oral, Q6HRS, Rene Verma M.D., 650 mg at 10/08/20 0607  •  oxyCODONE immediate-release (ROXICODONE) tablet 2.5 mg, 2.5 mg, Oral, Q3HRS PRN, Rene Verma M.D., 2.5 mg at 10/07/20 2037  •  bisacodyl (DULCOLAX) suppository 10 mg, 10 mg, Rectal, QDAY PRN **OR** bisacodyl EC (DULCOLAX) tablet 10 mg, 10 mg, Oral, QDAY PRN, Rene Verma M.D.  •  insulin glargine (Lantus) injection, 15 Units, Subcutaneous, QA INSULIN, Francisco J Starks M.D., 15 Units at 10/08/20 0616  •  acetaminophen (TYLENOL) tablet 650 mg, 650 mg, Oral, Q6HRS PRN, Hailey Jolley M.D., 650 mg at 10/06/20 2155  •  ondansetron (ZOFRAN) syringe/vial injection 4 mg, 4 mg, Intravenous, Q4HRS PRN, Hailey Jolley M.D.  •  ondansetron (ZOFRAN ODT) dispertab 4 mg, 4 mg, Oral, Q4HRS PRN, Hailey Jolley M.D.  •  senna-docusate (PERICOLACE or SENOKOT S) 8.6-50 MG per tablet 2 Tab, 2 Tab, Oral, BID, 2 Tab at 10/08/20 0606 **AND** polyethylene glycol/lytes (MIRALAX) PACKET 1 Packet, 1 Packet, Oral, QDAY PRN **AND** magnesium hydroxide (MILK OF MAGNESIA) suspension 30 mL, 30 mL, Oral, QDAY PRN **AND** bisacodyl (DULCOLAX) suppository 10 mg, 10 mg, Rectal, QDAY PRN, Hailey Jolley M.D.  •  atorvastatin (LIPITOR) tablet 40 mg, 40 mg, Oral, Q EVENING, Hailey Jolley M.D., 40 mg at 10/06/20 1724  •  insulin lispro (HumaLOG) injection, 1-6 Units, Subcutaneous, 4X/DAY ACHS, 1 Units at 10/08/20 0752 **AND** POC Blood Glucose, , , Q AC AND BEDTIME(S) **AND** NOTIFY MD and PharmD, , , Once **AND** glucose 4 g chewable tablet 16 g, 16 g, Oral, Q15 MIN PRN **AND** dextrose 50% (D50W) injection 50 mL, 50 mL, Intravenous, Q15 MIN PRN, Hailey Jolley M.D.  •  clopidogrel (PLAVIX) tablet 75 mg, 75 mg, Oral, DAILY, Hailey Jolley M.D., 75 mg at 10/08/20 0606  •  levothyroxine (SYNTHROID) tablet 75 mcg, 75 mcg, Oral, AM ES, Hailey Jolley M.D., 75 mcg at 10/08/20 0607  •  omeprazole  (PRILOSEC) capsule 40 mg, 40 mg, Oral, BID, Hailey Jolley M.D., 40 mg at 10/08/20 0607  •  diphenhydrAMINE (BENADRYL) tablet/capsule 25 mg, 25 mg, Oral, HS PRN, Shannon Zavala A.P.R.NChance, 25 mg at 10/06/20 2155    Physical Examination:     Vitals:    10/08/20 0500 10/08/20 0600 10/08/20 0700 10/08/20 0800   BP:   138/63 133/60   Pulse: 70 74 70 76   Resp: 19 (!) 25 20 20   Temp:  36 °C (96.8 °F)  36.4 °C (97.5 °F)   TempSrc:  Temporal  Temporal   SpO2: 95% 98% 97% 94%   Weight:       Height:           General: Patient is awake and in no acute distress  Eyes: examination of optic disks not indicated at this time  CV: RRR    NEUROLOGICAL EXAM:     Mental status: Awake, alert and fully oriented, follows commands  Speech and language: speech is clear and fluent. The patient is able to name and repeat.  Cranial nerve exam: Pupils are equal, round and reactive to light bilaterally. Visual fields are full. Extraocular muscles are intact. Sensation in the face is intact to light touch. Face is symmetric. Hearing to finger rub equal. Palate elevates symmetrically. Shoulder shrug is full. Tongue is midline.  Motor exam: Strength is 5/5 in all extremities both distally and proximally. Tone is normal. No abnormal movements were seen on exam.  Sensory exam: No sensory deficits identified   Deep tendon reflexes:  2+ and symmetric. Toes down-going bilaterally.  Coordination: no ataxia       Objective Data:    Labs:  Lab Results   Component Value Date/Time    PROTHROMBTM 13.8 10/06/2020 07:08 PM    INR 1.03 10/06/2020 07:08 PM      Lab Results   Component Value Date/Time    WBC 8.5 10/08/2020 03:55 AM    RBC 3.81 (L) 10/08/2020 03:55 AM    HEMOGLOBIN 13.2 10/08/2020 03:55 AM    HEMATOCRIT 39.5 10/08/2020 03:55 AM    .1 (H) 10/08/2020 03:55 AM    MCH 34.6 (H) 10/08/2020 03:55 AM    MCHC 33.6 10/08/2020 03:55 AM    MPV 10.6 10/08/2020 03:55 AM    NEUTSPOLYS 59.10 10/06/2020 08:41 AM    LYMPHOCYTES 29.90 10/06/2020 08:41  AM    MONOCYTES 9.50 10/06/2020 08:41 AM    EOSINOPHILS 1.00 10/06/2020 08:41 AM    BASOPHILS 0.30 10/06/2020 08:41 AM      Lab Results   Component Value Date/Time    SODIUM 137 10/08/2020 03:55 AM    POTASSIUM 3.8 10/08/2020 03:55 AM    CHLORIDE 102 10/08/2020 03:55 AM    CO2 22 10/08/2020 03:55 AM    GLUCOSE 144 (H) 10/08/2020 03:55 AM    BUN 17 10/08/2020 03:55 AM    CREATININE 0.70 10/08/2020 03:55 AM    CREATININE 1.0 03/06/2009 09:50 AM      Lab Results   Component Value Date/Time    CHOLSTRLTOT 166 10/06/2020 08:41 AM    LDL 89 10/06/2020 08:41 AM    HDL 54 10/06/2020 08:41 AM    TRIGLYCERIDE 115 10/06/2020 08:41 AM       Lab Results   Component Value Date/Time    ALKPHOSPHAT 99 10/06/2020 08:41 AM    ASTSGOT 17 10/06/2020 08:41 AM    ALTSGPT 31 10/06/2020 08:41 AM    TBILIRUBIN 0.9 10/06/2020 08:41 AM        Imaging/Testing:  MR-BRAIN-W/O   Final Result      1.  Multifocal areas of tiny infarcts in the bilateral frontal, and left parietal, temporal and occipital lobes.   2.  Severe chronic microvascular ischemic disease.   3.  Mild cerebral volume loss.      DX-CHEST-PORTABLE (1 VIEW)   Final Result      Mild hypoinflation without other evidence for acute cardiopulmonary disease.      CT-CTA NECK WITH & W/O-POST PROCESSING   Final Result      1.  Approximately 75% narrowing at the origin of the left internal carotid artery.      2.  Approximately 25% focal stenosis at the origin of the right internal carotid artery.      3.  Patent bilateral vertebral arteries.      4.  Minimal narrowing at the origin of the left subclavian artery.      CT-CTA HEAD WITH & W/O-POST PROCESS   Final Result      CT angiogram of the [place] within normal limits.      CT-HEAD W/O   Final Result      1.  No acute intracranial process.      2.  Atrophy and small vessel ischemic change.            Assessment and Plan:    80-year-old female presented with right-sided weakness and aphasia.  Was not given TPA due to improved  symptoms.  And outside window.  CTA head and neck showed a 75% left carotid stenosis, vascular surgeon was consulted.  Status post left CEA with no complications.  Etiology of the infarcts most likely artery to artery embolic phenomenon from this left carotid plaque.  However I am not 100% sure as she did have bilateral frontal infarcts.  A cardioembolic phenomenon cannot be entirely ruled out.        Plan:  1.  Continue Plavix 75 mg daily  2.  Follow-up in [facility].  3.  May benefit from outpatient referral to cardiology for consideration of long-term cardiac monitoring  4.  Blood pressures well controlled  5.  Continue statin  6.  Glucotrol per primary recommendations normoglycemia      Patient can be discharged home from neurological point of view.        The evaluation of the patient, and recommended management, was discussed with the resident staff. I have performed a physical exam and reviewed and updated ROS and Plan today (10/8/2020). In review of yesterday's note (10/7/2020), there are no changes except as documented above.    This chart was partially generated using voice recognition technology and sound alike word replacement may be present, best efforts were made to make the chart accurate.    Sixto Dixon MD  Board Certified Neurology, ABPN  (t) [phone]

## 2020-10-08 NOTE — PROGRESS NOTES
Dr. Cunningham into pod to check on pt. Aware that pt is almost off of Jt. Stated will not put official consult in but will manage pt overnight in unit and if pt remains off Jt overnight, plan will be to transfer back to the floor tomorrow.

## 2020-10-08 NOTE — PROGRESS NOTES
Pt arrived to unit on floor bed, attached to Zoll monitor with 2 PACU nurses. VS stable. On 2L O2. Transferred to ICU bed and attached to room monitor. Report received from PACU nurses. All pt belongings brought to room previously by floor staff. All belongings put in pt closet. No items of value needing to be locked up in safe keeping. Pt complaining of pain to left neck related to sx on arrival. As per PACU staff, pt's daughter updated on transfer to ICU. 2 RN skin check completed with BISI Villalta. Bruising noted to right foot as documented previously on floor. Otherwise skin check is unremarkable. No further concerns at this time.

## 2020-10-08 NOTE — PROGRESS NOTES
Progress Note    CC: Headache    HPI:  Ms. White is an 80 y.o. female w/ dementia, still lives at home, who presented 10/5/2020 with right-sided weakness, aphasia, and headache. She has prior hx of stroke/TIA and is on plavix at baseline.      Workup suggested high-grade left carotid stenosis.    10/7 -  To OR today for CEA  10/8 -  Doing well and feels ready to go home    Physical Exam  General appearance: frail, comfortable, no distress  Psych: answers questions appropriately, cooperative, normal mood and affect  Neuro: AOx3, CN II-XII grossly intact. No obvious facial droop. Moving all extremities. Equal  strength  Neck: full range of motion  Lungs: No inspiratory stridor or wheezing  CV: RRR  Abdomen: Soft, NT/ND  Skin: No rashes  Healing surgical site R foot 2nd toe, stitch in place, ecchymosis, good pulse      Assessment/Plan & Medical Decision-Making  80F w/ hx of CVA/TIA presenting w/ a left-sided CVA w/ aphasia and right-sided weakness. Fractured plaque at the bulb. After discussion w/ the pts family and the pt herself, she does not have dementia and is lucid. We discussed the R/B/A to surgery and she would like to proceed.    POD #1 L CEA Neuro at baseline no signs of bleeding discharge home     Will need follow up in our clinic in 2 weeks    Yady Hernandez PA-C  Vascular Surgery  [facility]  Office: [phone]

## 2020-10-08 NOTE — CARE PLAN
Problem: Pain Management  Goal: Pain level will decrease to patient's comfort goal  Outcome: PROGRESSING AS EXPECTED     Problem: Bowel/Gastric:  Goal: Normal bowel function is maintained or improved  Outcome: PROGRESSING SLOWER THAN EXPECTED

## 2020-10-08 NOTE — DISCHARGE SUMMARY
Discharge Summary    CHIEF COMPLAINT ON ADMISSION  Chief Complaint   Patient presents with   • Possible Stroke       Reason for Admission  Stroke     Admission Date  10/5/2020    CODE STATUS  Full Code    HPI & HOSPITAL COURSE  This is a 80 y.o. female who presented 10/5/2020 with history of stroke, TIA on plavix, who presented with right sided weakness since this morning. She was last known normal at approx 7am. She was severely hypertensive en route. She appears to have aphasia but no facial droop. History was severely limited by dementia, verbal deficits on admission.    Mental status cleared over the next 24 hours. Alert and oriented with no obvious neurological deficit and wanting to have carotid endarterectomy.    Left Carotid Endarterectomy on 10/7/2020 by Dr. Verma.      POD #1 Doing well with uneventful perioperative course.  Neuro at baseline.  Discharge home.       Therefore, she is discharged in good and stable condition to home with close outpatient follow-up.    The patient met 2-midnight criteria for an inpatient stay at the time of discharge.    Discharge Date  10/8/2020    FOLLOW UP ITEMS POST DISCHARGE  Appointment with [facility] in 2 weeks for wound check  Home adrianna for PT    DISCHARGE DIAGNOSES  Active Problems:    CVA (Cerebral Vascular Accident), h/o POA: Yes      Overview: S/p L CEA uneventful    Carotid stenosis POA: Yes    Essential hypertension POA: Yes      Overview: ICD-10 transition    Type 2 diabetes mellitus with hyperglycemia, without long-term current use of insulin (HCC) POA: Yes  Resolved Problems:    * No resolved hospital problems. *      FOLLOW UP  No future appointments.  Rene Verma M.D.  [address] [place] [id number] [phone]    In 2 weeks  For wound re-check      MEDICATIONS ON DISCHARGE     Medication List      CHANGE how you take these medications      Instructions   amLODIPine 2.5 MG Tabs  What changed:   · medication strength  · how  much to take  · when to take this  Commonly known as: NORVASC   Take 1 Tab by mouth every day for 30 days.  Dose: 2.5 mg     atorvastatin 40 MG Tabs  What changed:   · medication strength  · how much to take  · when to take this  Commonly known as: LIPITOR   Take 1 Tab by mouth every evening.  Dose: 40 mg        CONTINUE taking these medications      Instructions   acyclovir 200 MG Caps  Commonly known as: ZOVIRAX   TAKE 1 CAPSULE EVERY DAY     clopidogrel 75 MG Tabs  Commonly known as: PLAVIX   Take 1 Tab by mouth every day.  Dose: 75 mg     CO Q 10 PO   Take 1 Tab by mouth every day.  Dose: 1 Tab     Cranberry 200 MG Caps   Take 1 Cap by mouth 2 Times a Day.  Dose: 1 Cap     docusate sodium 100 MG Caps  Commonly known as: COLACE   Take 100 mg by mouth 2 times a day.  Dose: 100 mg     glipiZIDE SR 5 MG Tb24  Commonly known as: GLUCOTROL   Take 1 Tab by mouth every day.  Dose: 5 mg     levothyroxine 75 MCG Tabs  Commonly known as: SYNTHROID   TAKE 1 TABLET EVERY MORNING ON AN EMPTY STOMACH     MAGNESIUM 27 PO   Take 1 Tab by mouth every day.  Dose: 1 Tab     OMEGA-3 PO   Take 1 Cap by mouth every day.  Dose: 1 Cap     omeprazole 40 MG delayed-release capsule  Commonly known as: PRILOSEC   Take 1 Cap by mouth 2 times a day.  Dose: 40 mg     polyethylene glycol/lytes 17 g Pack  Commonly known as: MIRALAX   Take 17 g by mouth every day.  Dose: 17 g     Vitamin D 50 MCG (2000 UT) Caps   Take 2,000 Units by mouth every day.  Dose: 2,000 Units            Allergies  Allergies   Allergen Reactions   • Asa [Aspirin]      GI bleeding   • Ciprofloxacin Rash   • Clindamycin Hives   • Codeine Vomiting   • Food Anxiety     Yellow dye, peanuts and peanut products, all peppers,tomato, potato,wheat, gluten   • Hydrocortisone      Suicidal after Cortisone injection   • Ibuprofen      Abdominal pain   • Imitrex [Sumatriptan Succinate] Vomiting   • Inderal [Propranolol Hcl]      Not sure of reaction   • Lyrica      suicidal   •  Mellaril      Psychotic episode   • Other Drug      All mycins,cillins,and cyclines   • Pcn [Penicillins] Hives   • Ropinirole Hcl [Requip] Shortness of Breath   • Shellfish Allergy      headaches   • Sulfa Drugs Itching   • Tagamet [Cimetidine]      Mental confusion       DIET  Orders Placed This Encounter   Procedures   • Diet Order Cardiac, Diabetic     Standing Status:   Standing     Number of Occurrences:   1     Order Specific Question:   Diet:     Answer:   Cardiac [6]     Order Specific Question:   Diet:     Answer:   Diabetic [3]     Order Specific Question:   Texture Modifier     Answer:   Level 6 - Soft & Bite Sized (Dysphagia 3)     Order Specific Question:   Liquid level     Answer:   Level 0 - Thin       ACTIVITY  As tolerated.  No lifting or strenous activity for 6 weeks    CONSULTATIONS  none    PROCEDURES  Left Carotid Endarterectomy    LABORATORY  Lab Results   Component Value Date    SODIUM 137 10/08/2020    POTASSIUM 3.8 10/08/2020    CHLORIDE 102 10/08/2020    CO2 22 10/08/2020    GLUCOSE 144 (H) 10/08/2020    BUN 17 10/08/2020    CREATININE 0.70 10/08/2020    CREATININE 1.0 03/06/2009        Lab Results   Component Value Date    WBC 8.5 10/08/2020    HEMOGLOBIN 13.2 10/08/2020    HEMATOCRIT 39.5 10/08/2020    PLATELETCT 176 10/08/2020        Total time of the discharge process exceeds 30 minutes.

## 2020-10-08 NOTE — DISCHARGE PLANNING
Hospital Care Management Discharge Planning       Anticipated Discharge Disposition:   · Home with home health      Action:   · RN JUSTIN met with the patient at the bedside to discuss home health choice   · Pt chose [facility]. [facility] and [facility]. [facility]  · Pt gave verbal consent to send referral   · Choice form faxed to ANEL Butler at extension [phone]  · Fax receipt received   · Ginger notified of incoming fax and that Pt will likely discharge today     Barriers to Discharge:   · HH acceptance and availability   · Medical clearance/ discharge orders      Plan:   · F/U with [facility]  · Hospital Care Management Team to continue to provide support services and assistance with discharge planning as needed.

## 2020-10-08 NOTE — PROGRESS NOTES
I was notified by Dr Verma and nursing staff that patient was placed in the unit from PACU after left CEA.  It appears patient was transferred here to maintain -160 and had required low-dose Jt-Synephrine.  Currently at 10 mcg/min.  Patient otherwise baseline.  It appears pressures dropped postoperatively after she was administered pain medication.  Patient was initially admitted 10/5 with a CVA with dysphasia and right-sided weakness which was waxing and waning.  During work-up patient noted to have high-grade left carotid stenosis and vascular surgery was consulted.  CT was 75% left carotid stenosis with likely fractured focal plaque at the bulb along with pedunculated thrombus in the proximal ICA.  Patient underwent left carotid endarterectomy 10/7.  Again she was then transferred to ICU secondary to requirement of Jt-Synephrine post narcotic pain regimen.  At this time it appears that she is clinically at baseline from preop status per report of staff.  Given patient is only here for maintenance of -160 and is currently on equivocal dose of Jt-Synephrine which will likely be titrated off momentarily we will keep her on the hospitalist service with anticipation back to the floor in a.m.  If this should change and patient require ongoing ICU stay, a formal consult will follow. Meanwhile we remain fully available for questions and to assist overnight.

## 2020-10-08 NOTE — OR NURSING
Patient recovered well in post-op. Neuro A&Ox4, occasionally forgetful, able to move all 4 extremities, sensations intact, pupils PERRLA. NIH negative. VSS, on 2L via NC. Patient started on phenylephrine gtt per anaesthesia to maintain -160. Unable to wean off gtt. Dr. Verma notified. Order placed to transfer to ICU. Surgical site L neck closed with dermabond, CDI. Left sided surgical neck pain and headache managed through PO medication. Patient able to intake fluids without nausea. Patient able to void x1. Family updated and discussed POC. No patient belongings in pacu. Report called to Lynn.

## 2020-10-08 NOTE — ANESTHESIA POSTPROCEDURE EVALUATION
Patient: Bailey Lama Destecroethan    Procedure Summary     Date: 10/07/20 Room / Location: [place] [address] / SURGERY [facility]    Anesthesia Start: 1323 Anesthesia Stop: 1557    Procedure: ENDARTERECTOMY, CAROTID (Left Neck) Diagnosis: (Symptomatic Carotid Stenosis)    Surgeon: Rene Verma M.D. Responsible Provider: Marylu Umana M.D.    Anesthesia Type: general ASA Status: 4          Final Anesthesia Type: general  Last vitals  BP   Blood Pressure : 130/56, Arterial BP: 128/45    Temp   36.1 °C (97 °F)    Pulse   Pulse: 66   Resp   (!) 23    SpO2   97 %      Anesthesia Post Evaluation    Patient location during evaluation: PACU  Patient participation: complete - patient participated  Level of consciousness: sleepy but conscious  Pain score: 4    Airway patency: patent  Anesthetic complications: no  Cardiovascular status: hemodynamically stable  Respiratory status: acceptable  Hydration status: euvolemic    PONV: none

## 2020-10-20 ENCOUNTER — OFFICE VISIT (OUTPATIENT)
Dept: MEDICAL GROUP | Facility: PHYSICIAN GROUP | Age: 80
End: 2020-10-20
Payer: MEDICARE

## 2020-10-20 VITALS
HEIGHT: 63 IN | WEIGHT: 168 LBS | HEART RATE: 88 BPM | SYSTOLIC BLOOD PRESSURE: 150 MMHG | TEMPERATURE: 97.1 F | OXYGEN SATURATION: 94 % | DIASTOLIC BLOOD PRESSURE: 80 MMHG | RESPIRATION RATE: 16 BRPM | BODY MASS INDEX: 29.77 KG/M2

## 2020-10-20 DIAGNOSIS — E11.65 TYPE 2 DIABETES MELLITUS WITH HYPERGLYCEMIA, WITHOUT LONG-TERM CURRENT USE OF INSULIN (HCC): ICD-10-CM

## 2020-10-20 DIAGNOSIS — K31.84 GASTROPARESIS: ICD-10-CM

## 2020-10-20 DIAGNOSIS — I65.29 STENOSIS OF CAROTID ARTERY, UNSPECIFIED LATERALITY: ICD-10-CM

## 2020-10-20 PROCEDURE — 99214 OFFICE O/P EST MOD 30 MIN: CPT | Performed by: NURSE PRACTITIONER

## 2020-10-20 ASSESSMENT — FIBROSIS 4 INDEX: FIB4 SCORE: 1.42

## 2020-10-20 NOTE — ASSESSMENT & PLAN NOTE
Taking glipizide 5 mg ER daily.  Blood sugars have been increasing.  Recent stroke.  A1c 12.0  Was treated with insulin in hospital.  FBS running high 200's.  Will increase glipizide to bid dosing. Recheck in 3 weeks.  Struggling with soft food needs requesting dietician referral.

## 2020-10-20 NOTE — LETTER
[facility]  RANDY Barfield  [address] [place] [address]  Fax: [phone]   Authorization for Release/Disclosure of   Protected Health Information   Name: BAILEY JAMES : 1940 SSN: [id number]   Address: [address] [place] [address] [place] [address] Phone:    [phone] (home) [phone] (work)   I authorize the entity listed below to release/disclose the PHI below to:   [facility]/RANDY Barfield and RANDY Barfield   Provider or Entity Name:  [facility]  Dr Martínez   Address   City, State, [facility]   Phone:      Fax:     Reason for request: continuity of care   Information to be released:    [  ] LAST COLONOSCOPY,  including any PATH REPORT and follow-up  [  ] LAST FIT/COLOGUARD RESULT [  ] LAST DEXA  [  ] LAST MAMMOGRAM  [  ] LAST PAP  [  ] LAST LABS [  ] RETINA EXAM REPORT  [  ] IMMUNIZATION RECORDS  [x  ] Release all info      [  ] Check here and initial the line next to each item to release ALL health information INCLUDING  _____ Care and treatment for drug and / or alcohol abuse  _____ HIV testing, infection status, or AIDS  _____ Genetic Testing    DATES OF SERVICE OR TIME PERIOD TO BE DISCLOSED: _____________  I understand and acknowledge that:  * This Authorization may be revoked at any time by you in writing, except if your health information has already been used or disclosed.  * Your health information that will be used or disclosed as a result of you signing this authorization could be re-disclosed by the recipient. If this occurs, your re-disclosed health information may no longer be protected by State or Federal laws.  * You may refuse to sign this Authorization. Your refusal will not affect your ability to obtain treatment.  * This Authorization becomes effective upon signing and will  on (date) __________.      If no date is indicated, this Authorization will  one (1) year from the signature date.    Name: Bailey Lama  Ana Cristinaroethan    Signature:   Date:     10/20/2020       PLEASE FAX REQUESTED RECORDS BACK TO: [phone]

## 2020-10-20 NOTE — ASSESSMENT & PLAN NOTE
Hear for hospital follow up.  Had headache/aphasia and REMSA.  Found carotid plaque 75% and had internal left carotid artery.  On amlodipine.  Increased atorvastatin to 40 mg.  No reported weakness reported or any new neuro symptoms.  Has follow up with neuro, gastro and GI next week.  Left neck scar is healing well, no exudate.

## 2020-10-20 NOTE — PROGRESS NOTES
Chief Complaint   Patient presents with   • Hospital Follow-up       Subjective:   Bailey White is a 80 y.o. female here today for evaluation and management of:    Carotid stenosis  Hear for hospital follow up.  Had headache/aphasia and REMSA.  Found carotid plaque 75% and had internal left carotid artery.  On amlodipine.  Increased atorvastatin to 40 mg.  No reported weakness reported or any new neuro symptoms.  Has follow up with neuro, gastro and GI next week.  Left neck scar is healing well, no exudate.     Type 2 diabetes mellitus with hyperglycemia, without long-term current use of insulin ([facility])  Taking glipizide 5 mg ER daily.  Blood sugars have been increasing.  Recent stroke.  A1c 12.0  Was treated with insulin in hospital.  FBS running high 200's.  Will increase glipizide to bid dosing. Recheck in 3 weeks.  Struggling with soft food needs requesting dietician referral.     Gastroparesis  Reports increasing stomach discomfort.  Has upcoming appointment with GI.          Current medicines (including changes today)  Current Outpatient Medications   Medication Sig Dispense Refill   • amLODIPine (NORVASC) 2.5 MG Tab Take 1 Tab by mouth every day for 30 days. 30 Tab 1   • atorvastatin (LIPITOR) 40 MG Tab Take 1 Tab by mouth every evening. 30 Tab 1   • polyethylene glycol/lytes (MIRALAX) 17 g Pack Take 17 g by mouth every day.     • docusate sodium (COLACE) 100 MG Cap Take 100 mg by mouth 2 times a day.     • Omega-3 Fatty Acids (OMEGA-3 PO) Take 1 Cap by mouth every day.     • clopidogrel (PLAVIX) 75 MG Tab Take 1 Tab by mouth every day. 90 Tab 3   • glipiZIDE SR (GLUCOTROL) 5 MG TABLET SR 24 HR Take 1 Tab by mouth every day. 90 Tab 3   • levothyroxine (SYNTHROID) 75 MCG Tab TAKE 1 TABLET EVERY MORNING ON AN EMPTY STOMACH 90 Tab 4   • acyclovir (ZOVIRAX) 200 MG Cap TAKE 1 CAPSULE EVERY DAY 90 Cap 4   • omeprazole (PRILOSEC) 40 MG delayed-release capsule Take 1 Cap by mouth 2 times a day. 180 Cap 3  "  • Coenzyme Q10 (CO Q 10 PO) Take 1 Tab by mouth every day.     • Cranberry 200 MG Cap Take 1 Cap by mouth 2 Times a Day.     • Magnesium Gluconate (MAGNESIUM 27 PO) Take 1 Tab by mouth every day.     • Cholecalciferol (VITAMIN D) 2000 UNITS CAPS Take 2,000 Units by mouth every day.       No current facility-administered medications for this visit.      She  has a past medical history of Anesthesia, Arthritis, Blood transfusion, Bronchitis (2011), Carotid stenosis, CATARACT, CVA (cerebral vascular accident) ([facility]), Diabetes, Fibromyalgia, Fibromyalgia, GERD (gastroesophageal reflux disease), Heart burn, Herpes genital, Hiatus hernia syndrome, HTN, transient ischemic attack (TIA), Indigestion, Insomnia, Migraine, Pain, Seizure ([facility]), Thyroid disease, TIA, Unspecified hemorrhagic conditions, Unspecified urinary incontinence, and Urinary bladder disorder. She also has no past medical history of ASTHMA or Breast cancer ([facility]).    ROS as stated in hpi  No chest pain, no shortness of breath, no abdominal pain       Objective:     /80 (BP Location: Left arm, Patient Position: Sitting)   Pulse 88   Temp 36.2 °C (97.1 °F) (Temporal)   Resp 16   Ht 1.6 m (5' 3\")   Wt 76.2 kg (168 lb)   SpO2 94%  Body mass index is 29.76 kg/m².   Physical Exam:  Constitutional: Alert, no distress.  Skin: Warm, dry, good turgor,no cyanosis, no rashes in visible areas. Noticeable bruises on arms from recent hospitalization/ IV's  Eye: Equal, round and reactive, conjunctiva clear, lids normal.  Ears: No tenderness, no discharge.  External canals are without any significant edema or erythema.  .  Gross auditory acuity is intact.  Nose: symmetrical without tenderness, no discharge.  Mouth/Throat: lips without lesion.  Oropharynx clear.    Neck: Trachea midline, no masses, no obvious thyroid enlargement.. No cervical or supraclavicular lymphadenopathy. Range of motion within normal limits.  Neuro: Cranial nerves 2-12 grossly intact.  No " sensory deficit.  Respiratory: Unlabored respiratory effort, lungs clear to auscultation, no wheezes, no ronchi.  Cardiovascular: Normal S1, S2, no murmur, no edema.  Abdomen: Soft, non-tender, no masses, no guarding,  no hepatosplenomegaly.  Psych: Alert and oriented x3, normal affect and mood and judgement.        Assessment and Plan:   The following treatment plan was discussed    1. Stenosis of carotid artery, unspecified laterality  New problem to me.  Acute.  Newly diagnosed with TIA symptoms and internal left carotid endarterectomy.  Hospital notes all reviewed with patient at this visit.  Reviewed medication, including increased statin doseage.  Has upcoming appointment with surgeon and neurologist next week.  Return in 3-4 weeks.  Monitor.     2. Type 2 diabetes mellitus with hyperglycemia, without long-term current use of insulin (HCC)  Chronic, ongoing, worsening.  A1c 12.0.  Refer to dietician for help with soft foods that are lower in carbs, sugar and higher in protein.  Increase glipizide to bid doseing.  Daily blood sugars return in 3-4 years.   - REFERRAL TO [facility] IMPROVEMENT PROGRAMS (HIP) Services Requested: Registered Dietitian for Medical Nutrition Therapy; Reason for Visit: Medical Condition Requiring Nutrition Counseling    3. Gastroparesis  Chronic, ongoing, worsening pain.  Followed by GI.  Has appointment next week.  Encouraged smaller, more frequent meals.  Discussed pathophysiology of gastroparesis.  Monitor.       Followup: Return in about 4 weeks (around 11/17/2020) for follow up carotid surgery.         Educated in proper administration of medication(s) ordered today including safety, possible SE, risks, benefits, rationale and alternatives to therapy.     Please note that this dictation was created using voice recognition software. I have made every reasonable attempt to correct obvious errors, but I expect that there are errors of grammar and possibly content that I did not  discover before finalizing the note.

## 2020-10-21 ENCOUNTER — HOSPITAL ENCOUNTER (OUTPATIENT)
Facility: MEDICAL CENTER | Age: 80
End: 2020-10-21
Attending: NURSE PRACTITIONER
Payer: MEDICARE

## 2020-10-21 ENCOUNTER — PATIENT MESSAGE (OUTPATIENT)
Dept: MEDICAL GROUP | Facility: PHYSICIAN GROUP | Age: 80
End: 2020-10-21

## 2020-10-21 DIAGNOSIS — R30.0 DYSURIA: ICD-10-CM

## 2020-10-21 LAB
APPEARANCE UR: ABNORMAL
BACTERIA #/AREA URNS HPF: ABNORMAL /HPF
BILIRUB UR QL STRIP.AUTO: NEGATIVE
COLOR UR: YELLOW
EPI CELLS #/AREA URNS HPF: ABNORMAL /HPF
GLUCOSE UR STRIP.AUTO-MCNC: 250 MG/DL
KETONES UR STRIP.AUTO-MCNC: NEGATIVE MG/DL
LEUKOCYTE ESTERASE UR QL STRIP.AUTO: ABNORMAL
MICRO URNS: ABNORMAL
NITRITE UR QL STRIP.AUTO: NEGATIVE
PH UR STRIP.AUTO: 5.5 [PH] (ref 5–8)
PROT UR QL STRIP: 100 MG/DL
RBC # URNS HPF: ABNORMAL /HPF
RBC UR QL AUTO: ABNORMAL
SP GR UR REFRACTOMETRY: 1.03
UROBILINOGEN UR STRIP.AUTO-MCNC: 0.2 MG/DL
WBC #/AREA URNS HPF: ABNORMAL /HPF

## 2020-10-21 PROCEDURE — 81001 URINALYSIS AUTO W/SCOPE: CPT

## 2020-10-21 PROCEDURE — 87186 SC STD MICRODIL/AGAR DIL: CPT

## 2020-10-21 PROCEDURE — 87086 URINE CULTURE/COLONY COUNT: CPT

## 2020-10-21 PROCEDURE — 87077 CULTURE AEROBIC IDENTIFY: CPT

## 2020-10-22 RX ORDER — NITROFURANTOIN 25; 75 MG/1; MG/1
100 CAPSULE ORAL 2 TIMES DAILY
Qty: 14 CAP | Refills: 0 | Status: SHIPPED | OUTPATIENT
Start: 2020-10-22 | End: 2020-11-16

## 2020-10-22 NOTE — PROGRESS NOTES
Requested Prescriptions     Signed Prescriptions Disp Refills   • nitrofurantoin (MACROBID) 100 MG Cap 14 Cap 0     Sig: Take 1 Cap by mouth 2 times a day.     JACE Barfield.

## 2020-10-27 ENCOUNTER — TELEPHONE (OUTPATIENT)
Dept: MEDICAL GROUP | Facility: PHYSICIAN GROUP | Age: 80
End: 2020-10-27

## 2020-10-27 NOTE — TELEPHONE ENCOUNTER
----- Message from RANDY Barfield sent at 10/24/2020  6:05 PM PDT -----  haley  The urine culture was positive for bacteria.  The bacteria is sensitive to the antibiotic we started.  Please finish the antibiotic.  I would like to retest your urine when you come back in November to be sure we have cleared it up.  Keep working hard to get your blood sugars down as that is the most common factor in getting a UTI.  RANDY Barfield

## 2020-10-27 NOTE — LETTER
October 27, 2020         Bailey Lama Destecroix  [address] [place] [address]        Dear Bailey:      Below are the results from your recent visit:    The urine culture was positive for bacteria.  The bacteria is sensitive to the antibiotic we started.  Please finish the antibiotic.  I would like to retest your urine when you come back in November to be sure we have cleared it up.  Keep working hard to get your blood sugars down as that is the most common factor in getting a UTI.   RANDY Barfield     Resulted Orders   URINALYSIS,CULTURE IF INDICATED   Result Value Ref Range    Color Yellow     Character Cloudy (A)     Ph 5.5 5.0 - 8.0    Glucose 250 (A) Negative mg/dL    Ketones Negative Negative mg/dL    Protein 100 (A) Negative mg/dL    Bilirubin Negative Negative    Urobilinogen, Urine 0.2 Negative    Nitrite Negative Negative    Leukocyte Esterase Small (A) Negative    Occult Blood Large (A) Negative    Micro Urine Req Microscopic    REFRACTOMETER SG   Result Value Ref Range    Specific Gravity 1.027    URINE MICROSCOPIC (W/UA)   Result Value Ref Range    -150 (A) /hpf      Comment:      Female  <12 Yr 0-2  >12 Yr 0-5  Male   None      RBC 20-50 (A) /hpf      Comment:      Female  >12 Yr 0-2  Male   None      Bacteria Many (A) None /hpf    Epithelial Cells Moderate (A) /hpf   URINE CULTURE(NEW)   Result Value Ref Range    Significant Indicator POS (POS)     Source UR     Site -     Culture Result - (A)     Culture Result Citrobacter koseri  >100,000 cfu/mL   (A)      If you have any questions or concerns, please don't hesitate to call.    Electronically Signed

## 2020-11-03 DIAGNOSIS — I63.6 CEREBROVASCULAR ACCIDENT (CVA) DUE TO NONPYOGENIC CEREBRAL VENOUS THROMBOSIS (HCC): ICD-10-CM

## 2020-11-16 ENCOUNTER — OFFICE VISIT (OUTPATIENT)
Dept: MEDICAL GROUP | Facility: PHYSICIAN GROUP | Age: 80
End: 2020-11-16
Payer: MEDICARE

## 2020-11-16 VITALS
TEMPERATURE: 97 F | HEART RATE: 84 BPM | SYSTOLIC BLOOD PRESSURE: 146 MMHG | DIASTOLIC BLOOD PRESSURE: 80 MMHG | HEIGHT: 63 IN | OXYGEN SATURATION: 97 % | RESPIRATION RATE: 16 BRPM | BODY MASS INDEX: 30.48 KG/M2 | WEIGHT: 172 LBS

## 2020-11-16 DIAGNOSIS — E11.65 TYPE 2 DIABETES MELLITUS WITH HYPERGLYCEMIA, WITHOUT LONG-TERM CURRENT USE OF INSULIN (HCC): ICD-10-CM

## 2020-11-16 DIAGNOSIS — N30.01 ACUTE CYSTITIS WITH HEMATURIA: ICD-10-CM

## 2020-11-16 DIAGNOSIS — I63.6 CEREBROVASCULAR ACCIDENT (CVA) DUE TO NONPYOGENIC CEREBRAL VENOUS THROMBOSIS (HCC): ICD-10-CM

## 2020-11-16 DIAGNOSIS — I10 ESSENTIAL HYPERTENSION: ICD-10-CM

## 2020-11-16 DIAGNOSIS — E11.9 DIABETES MELLITUS TYPE 2, NONINSULIN DEPENDENT (HCC): ICD-10-CM

## 2020-11-16 PROBLEM — R10.31 RIGHT LOWER QUADRANT ABDOMINAL PAIN: Status: RESOLVED | Noted: 2018-06-19 | Resolved: 2020-11-16

## 2020-11-16 PROBLEM — Z09 HOSPITAL DISCHARGE FOLLOW-UP: Status: RESOLVED | Noted: 2019-09-03 | Resolved: 2020-11-16

## 2020-11-16 PROBLEM — I25.9 CHEST PAIN DUE TO MYOCARDIAL ISCHEMIA: Status: RESOLVED | Noted: 2017-12-18 | Resolved: 2020-11-16

## 2020-11-16 LAB
APPEARANCE UR: CLEAR
BILIRUB UR STRIP-MCNC: NEGATIVE MG/DL
COLOR UR AUTO: NORMAL
GLUCOSE UR STRIP.AUTO-MCNC: 1000 MG/DL
KETONES UR STRIP.AUTO-MCNC: NEGATIVE MG/DL
LEUKOCYTE ESTERASE UR QL STRIP.AUTO: NEGATIVE
NITRITE UR QL STRIP.AUTO: NEGATIVE
PH UR STRIP.AUTO: 5 [PH] (ref 5–8)
PROT UR QL STRIP: NEGATIVE MG/DL
RBC UR QL AUTO: NEGATIVE
SP GR UR STRIP.AUTO: 1.02
UROBILINOGEN UR STRIP-MCNC: 0.2 MG/DL

## 2020-11-16 PROCEDURE — 81002 URINALYSIS NONAUTO W/O SCOPE: CPT | Performed by: NURSE PRACTITIONER

## 2020-11-16 PROCEDURE — 99214 OFFICE O/P EST MOD 30 MIN: CPT | Mod: 25 | Performed by: NURSE PRACTITIONER

## 2020-11-16 RX ORDER — AMLODIPINE BESYLATE 2.5 MG/1
2.5 TABLET ORAL DAILY
Qty: 30 TAB | Refills: 1
Start: 2020-11-16 | End: 2020-12-07 | Stop reason: SDUPTHER

## 2020-11-16 RX ORDER — GLIPIZIDE 5 MG/1
5 TABLET, FILM COATED, EXTENDED RELEASE ORAL 2 TIMES DAILY
Qty: 180 TAB | Refills: 3
Start: 2020-11-16 | End: 2020-12-07 | Stop reason: SDUPTHER

## 2020-11-16 ASSESSMENT — FIBROSIS 4 INDEX: FIB4 SCORE: 1.42

## 2020-11-16 NOTE — PROGRESS NOTES
Chief Complaint   Patient presents with   • Follow-Up     post op    • Medication Management       Subjective:   Bailey White is a 80 y.o. female here today for evaluation and management of:    Acute cystitis with hematuria  Reports improved symptoms,  Rechecking urine today. macrobid seemed to take care of the symptoms.     CVA (Cerebral Vascular Accident), h/o  Reports that word finding is still an issue, mostly if tired. Able to do embroidery.  Taking naps during the day. Reports neuro exam with EEG awaiting results.      Type 2 diabetes mellitus with hyperglycemia, without long-term current use of insulin (HCC)  gliipizde bid.  Running bs at 202.  Spilling glucose in urine.  Dietary issues.  Reports heavy breads in diet.  Has upcoming appointment with dietician at [facility].      Essential hypertension  takiing amlodipine 2.5 mg daily.  bp running just slightly high.  No chest pain reported.           Current medicines (including changes today)  Current Outpatient Medications   Medication Sig Dispense Refill   • amLODIPine (NORVASC) 2.5 MG Tab Take 1 Tab by mouth every day for 30 days. 30 Tab 1   • glipiZIDE SR (GLUCOTROL) 5 MG TABLET SR 24 HR Take 1 Tab by mouth 2 Times a Day. 180 Tab 3   • atorvastatin (LIPITOR) 40 MG Tab Take 1 Tab by mouth every evening. 30 Tab 1   • polyethylene glycol/lytes (MIRALAX) 17 g Pack Take 17 g by mouth every day.     • docusate sodium (COLACE) 100 MG Cap Take 100 mg by mouth 2 times a day.     • Omega-3 Fatty Acids (OMEGA-3 PO) Take 1 Cap by mouth every day.     • clopidogrel (PLAVIX) 75 MG Tab Take 1 Tab by mouth every day. 90 Tab 3   • levothyroxine (SYNTHROID) 75 MCG Tab TAKE 1 TABLET EVERY MORNING ON AN EMPTY STOMACH 90 Tab 4   • acyclovir (ZOVIRAX) 200 MG Cap TAKE 1 CAPSULE EVERY DAY 90 Cap 4   • omeprazole (PRILOSEC) 40 MG delayed-release capsule Take 1 Cap by mouth 2 times a day. 180 Cap 3   • Coenzyme Q10 (CO Q 10 PO) Take 1 Tab by mouth every day.     • Cranberry  "200 MG Cap Take 1 Cap by mouth 2 Times a Day.     • Magnesium Gluconate (MAGNESIUM 27 PO) Take 1 Tab by mouth every day.     • Cholecalciferol (VITAMIN D) 2000 UNITS CAPS Take 2,000 Units by mouth every day.       No current facility-administered medications for this visit.      She  has a past medical history of Anesthesia, Arthritis, Blood transfusion, Bronchitis (2011), Carotid stenosis, CATARACT, CVA (cerebral vascular accident) ([facility]), Diabetes, Fibromyalgia, Fibromyalgia, GERD (gastroesophageal reflux disease), Heart burn, Herpes genital, Hiatus hernia syndrome, HTN, transient ischemic attack (TIA), Indigestion, Insomnia, Migraine, Pain, Seizure ([facility]), Thyroid disease, TIA, Unspecified hemorrhagic conditions, Unspecified urinary incontinence, and Urinary bladder disorder. She also has no past medical history of ASTHMA or Breast cancer ([facility]).    ROS as stated in hpi  No chest pain, no shortness of breath, no abdominal pain       Objective:     /80 (BP Location: Left arm, Patient Position: Sitting)   Pulse 84   Temp 36.1 °C (97 °F) (Temporal)   Resp 16   Ht 1.6 m (5' 3\")   Wt 78 kg (172 lb)   SpO2 97%  Body mass index is 30.47 kg/m².   Physical Exam:  Constitutional: Alert, no distress.  Skin: Warm, dry, good turgor,no cyanosis, no rashes in visible areas.  Eye: Equal, round and reactive, conjunctiva clear, lids normal.  Ears: No tenderness, no discharge.  External canals are without any significant edema or erythema.  Tympanic membranes are without any inflammation, no effusion.  Gross auditory acuity is intact.  Nose: symmetrical without tenderness, no discharge.  Mouth/Throat: lips without lesion.  Oropharynx clear.  Throat without erythema, exudates or tonsillar enlargement.  Neck: Trachea midline, no masses, no obvious thyroid enlargement.. No cervical or supraclavicular lymphadenopathy. Range of motion within normal limits.  Neuro: Cranial nerves 2-12 grossly intact.  No sensory " deficit.  Respiratory: Unlabored respiratory effort, lungs clear to auscultation, no wheezes, no ronchi.  Cardiovascular: Normal S1, S2, no murmur, no edema.  Abdomen: Soft, non-tender, no masses, no guarding,  no hepatosplenomegaly.  Psych: Alert and oriented x3, normal affect and mood and judgement.        Assessment and Plan:   The following treatment plan was discussed    1. Acute cystitis with hematuria  Acute.  Recheck urine today.  Clear, no bacteria or blood.  Encouraged better diabetes control to decrease infection risk.  Monitor and follow.   - POCT Urinalysis    2. Cerebrovascular accident (CVA) due to nonpyogenic cerebral venous thrombosis (HCC)  Recent stroke.  Improving but still issues with wordfinding.  Neuro is following currently.  Continues with plavix and atorvastatin    3. Diabetes mellitus type 2, noninsulin dependent (HCC)  Chronic, ongoing. Not in good control. Unable to take metformin.  Discussed possible injectable (victoza/ozempic/trulicity).  Has upcoming appointment with dietician.  Encouraged less sweets, candies and breads. Return in 3 months.  Labs ordered.   - glipiZIDE SR (GLUCOTROL) 5 MG TABLET SR 24 HR; Take 1 Tab by mouth 2 Times a Day.  Dispense: 180 Tab; Refill: 3  - HEMOGLOBIN A1C; Future  - MICROALBUMIN CREAT RATIO URINE; Future  - Lipid Profile; Future    4. Type 2 diabetes mellitus with hyperglycemia, without long-term current use of insulin (HCC)  See #3    5. Essential hypertension  Chronic, ongoing. Amlodipine 2.5 mg.  Monitor and follow.   - CBC WITH DIFFERENTIAL; Future  - Comp Metabolic Panel; Future      Followup: Return in about 3 months (around 2/16/2021) for Diabetes.         Educated in proper administration of medication(s) ordered today including safety, possible SE, risks, benefits, rationale and alternatives to therapy.     Please note that this dictation was created using voice recognition software. I have made every reasonable attempt to correct obvious  errors, but I expect that there are errors of grammar and possibly content that I did not discover before finalizing the note.

## 2020-11-16 NOTE — ASSESSMENT & PLAN NOTE
Reports improved symptoms,  Rechecking urine today. macrobid seemed to take care of the symptoms.

## 2020-11-16 NOTE — ASSESSMENT & PLAN NOTE
gliipizde bid.  Running bs at 202.  Spilling glucose in urine.  Dietary issues.  Reports heavy breads in diet.  Has upcoming appointment with dietician at [facility].

## 2020-11-16 NOTE — ASSESSMENT & PLAN NOTE
Reports that word finding is still an issue, mostly if tired. Able to do embroidery.  Taking naps during the day. Reports neuro exam with EEG awaiting results.

## 2020-12-07 DIAGNOSIS — I63.6 CEREBROVASCULAR ACCIDENT (CVA) DUE TO NONPYOGENIC CEREBRAL VENOUS THROMBOSIS (HCC): ICD-10-CM

## 2020-12-07 DIAGNOSIS — E11.9 DIABETES MELLITUS TYPE 2, NONINSULIN DEPENDENT (HCC): ICD-10-CM

## 2020-12-07 DIAGNOSIS — I65.29 STENOSIS OF CAROTID ARTERY, UNSPECIFIED LATERALITY: ICD-10-CM

## 2020-12-07 RX ORDER — AMLODIPINE BESYLATE 2.5 MG/1
2.5 TABLET ORAL DAILY
Qty: 90 TAB | Refills: 0 | Status: SHIPPED | OUTPATIENT
Start: 2020-12-07 | End: 2021-01-06

## 2020-12-07 RX ORDER — GLIPIZIDE 5 MG/1
5 TABLET, FILM COATED, EXTENDED RELEASE ORAL 2 TIMES DAILY
Qty: 180 TAB | Refills: 0 | Status: SHIPPED | OUTPATIENT
Start: 2020-12-07 | End: 2021-04-20

## 2020-12-07 NOTE — TELEPHONE ENCOUNTER
VOICEMAIL  1. Caller Name: Brennon                      Call Back Number: [phone] (home) [phone] (work)      2. Message: Patient needs a new referral to Dr. Peña per Dr Haines, he thinks her stroke came from her heart not the blocked carotid artery and wants her to see cardiology.     3. Patient approves office to leave a detailed voicemail/MyChart message: N\A    Received request via: Patient    Was the patient seen in the last year in this department? Yes    Does the patient have an active prescription (recently filled or refills available) for medication(s) requested? YES REQUESTING 90 DAYS TO MAIL ORDER PHARAMCY

## 2020-12-07 NOTE — TELEPHONE ENCOUNTER
Requested Prescriptions     Signed Prescriptions Disp Refills   • amLODIPine (NORVASC) 2.5 MG Tab 90 Tab 0     Sig: Take 1 Tab by mouth every day for 30 days.     Authorizing Provider: PRATEEK HART   • glipiZIDE SR (GLUCOTROL) 5 MG TABLET SR 24  Tab 0     Sig: Take 1 Tab by mouth 2 Times a Day.     Authorizing Provider: PRATEEK HART A.P.R.N.

## 2021-01-11 DIAGNOSIS — Z23 NEED FOR VACCINATION: ICD-10-CM

## 2021-01-20 ENCOUNTER — TELEPHONE (OUTPATIENT)
Dept: MEDICAL GROUP | Facility: PHYSICIAN GROUP | Age: 81
End: 2021-01-20

## 2021-01-20 NOTE — TELEPHONE ENCOUNTER
[facility] [place] requesting most resent Chart Notes for Incontinence supplies.  Printed VV on 05/6/2020 and Faxed.

## 2021-02-09 ENCOUNTER — HOSPITAL ENCOUNTER (OUTPATIENT)
Dept: LAB | Facility: MEDICAL CENTER | Age: 81
End: 2021-02-09
Attending: NURSE PRACTITIONER
Payer: MEDICARE

## 2021-02-09 DIAGNOSIS — I10 ESSENTIAL HYPERTENSION: ICD-10-CM

## 2021-02-09 DIAGNOSIS — E11.9 DIABETES MELLITUS TYPE 2, NONINSULIN DEPENDENT (HCC): ICD-10-CM

## 2021-02-09 LAB
ALBUMIN SERPL BCP-MCNC: 4.1 G/DL (ref 3.2–4.9)
ALBUMIN/GLOB SERPL: 1.3 G/DL
ALP SERPL-CCNC: 100 U/L (ref 30–99)
ALT SERPL-CCNC: 18 U/L (ref 2–50)
ANION GAP SERPL CALC-SCNC: 12 MMOL/L (ref 7–16)
AST SERPL-CCNC: 20 U/L (ref 12–45)
BASOPHILS # BLD AUTO: 0.1 % (ref 0–1.8)
BASOPHILS # BLD: 0.01 K/UL (ref 0–0.12)
BILIRUB SERPL-MCNC: 0.4 MG/DL (ref 0.1–1.5)
BUN SERPL-MCNC: 15 MG/DL (ref 8–22)
CALCIUM SERPL-MCNC: 9.9 MG/DL (ref 8.5–10.5)
CHLORIDE SERPL-SCNC: 103 MMOL/L (ref 96–112)
CHOLEST SERPL-MCNC: 146 MG/DL (ref 100–199)
CO2 SERPL-SCNC: 23 MMOL/L (ref 20–33)
CREAT SERPL-MCNC: 0.89 MG/DL (ref 0.5–1.4)
EOSINOPHIL # BLD AUTO: 0.01 K/UL (ref 0–0.51)
EOSINOPHIL NFR BLD: 0.1 % (ref 0–6.9)
ERYTHROCYTE [DISTWIDTH] IN BLOOD BY AUTOMATED COUNT: 46 FL (ref 35.9–50)
EST. AVERAGE GLUCOSE BLD GHB EST-MCNC: 235 MG/DL
FASTING STATUS PATIENT QL REPORTED: NORMAL
GLOBULIN SER CALC-MCNC: 3.2 G/DL (ref 1.9–3.5)
GLUCOSE SERPL-MCNC: 219 MG/DL (ref 65–99)
HBA1C MFR BLD: 9.8 % (ref 0–5.6)
HCT VFR BLD AUTO: 43.3 % (ref 37–47)
HDLC SERPL-MCNC: 50 MG/DL
HGB BLD-MCNC: 14.2 G/DL (ref 12–16)
IMM GRANULOCYTES # BLD AUTO: 0.04 K/UL (ref 0–0.11)
IMM GRANULOCYTES NFR BLD AUTO: 0.6 % (ref 0–0.9)
LDLC SERPL CALC-MCNC: 79 MG/DL
LYMPHOCYTES # BLD AUTO: 1.52 K/UL (ref 1–4.8)
LYMPHOCYTES NFR BLD: 22.6 % (ref 22–41)
MCH RBC QN AUTO: 33.4 PG (ref 27–33)
MCHC RBC AUTO-ENTMCNC: 32.8 G/DL (ref 33.6–35)
MCV RBC AUTO: 101.9 FL (ref 81.4–97.8)
MONOCYTES # BLD AUTO: 0.63 K/UL (ref 0–0.85)
MONOCYTES NFR BLD AUTO: 9.4 % (ref 0–13.4)
NEUTROPHILS # BLD AUTO: 4.51 K/UL (ref 2–7.15)
NEUTROPHILS NFR BLD: 67.2 % (ref 44–72)
NRBC # BLD AUTO: 0 K/UL
NRBC BLD-RTO: 0 /100 WBC
PLATELET # BLD AUTO: 217 K/UL (ref 164–446)
PMV BLD AUTO: 11.3 FL (ref 9–12.9)
POTASSIUM SERPL-SCNC: 4.1 MMOL/L (ref 3.6–5.5)
PROT SERPL-MCNC: 7.3 G/DL (ref 6–8.2)
RBC # BLD AUTO: 4.25 M/UL (ref 4.2–5.4)
SODIUM SERPL-SCNC: 138 MMOL/L (ref 135–145)
TRIGL SERPL-MCNC: 87 MG/DL (ref 0–149)
WBC # BLD AUTO: 6.7 K/UL (ref 4.8–10.8)

## 2021-02-09 PROCEDURE — 80061 LIPID PANEL: CPT

## 2021-02-09 PROCEDURE — 80053 COMPREHEN METABOLIC PANEL: CPT

## 2021-02-09 PROCEDURE — 36415 COLL VENOUS BLD VENIPUNCTURE: CPT

## 2021-02-09 PROCEDURE — 83036 HEMOGLOBIN GLYCOSYLATED A1C: CPT

## 2021-02-09 PROCEDURE — 85025 COMPLETE CBC W/AUTO DIFF WBC: CPT

## 2021-02-10 ENCOUNTER — HOSPITAL ENCOUNTER (OUTPATIENT)
Facility: MEDICAL CENTER | Age: 81
End: 2021-02-10
Attending: NURSE PRACTITIONER
Payer: MEDICARE

## 2021-02-10 LAB
CREAT UR-MCNC: 56.42 MG/DL
MICROALBUMIN UR-MCNC: <1.2 MG/DL
MICROALBUMIN/CREAT UR: NORMAL MG/G (ref 0–30)

## 2021-02-10 PROCEDURE — 82570 ASSAY OF URINE CREATININE: CPT

## 2021-02-10 PROCEDURE — 82043 UR ALBUMIN QUANTITATIVE: CPT

## 2021-02-11 ENCOUNTER — OFFICE VISIT (OUTPATIENT)
Dept: MEDICAL GROUP | Facility: PHYSICIAN GROUP | Age: 81
End: 2021-02-11
Payer: MEDICARE

## 2021-02-11 ENCOUNTER — HOSPITAL ENCOUNTER (OUTPATIENT)
Dept: LAB | Facility: MEDICAL CENTER | Age: 81
End: 2021-02-11
Attending: NURSE PRACTITIONER
Payer: MEDICARE

## 2021-02-11 VITALS
SYSTOLIC BLOOD PRESSURE: 138 MMHG | DIASTOLIC BLOOD PRESSURE: 78 MMHG | HEART RATE: 88 BPM | WEIGHT: 166 LBS | RESPIRATION RATE: 18 BRPM | BODY MASS INDEX: 29.41 KG/M2 | OXYGEN SATURATION: 96 % | TEMPERATURE: 97 F | HEIGHT: 63 IN

## 2021-02-11 DIAGNOSIS — D53.9 MACROCYTIC ANEMIA: ICD-10-CM

## 2021-02-11 DIAGNOSIS — N39.490 OVERFLOW INCONTINENCE OF URINE: ICD-10-CM

## 2021-02-11 DIAGNOSIS — E11.65 TYPE 2 DIABETES MELLITUS WITH HYPERGLYCEMIA, WITHOUT LONG-TERM CURRENT USE OF INSULIN (HCC): ICD-10-CM

## 2021-02-11 LAB
FOLATE SERPL-MCNC: 33.5 NG/ML
VIT B12 SERPL-MCNC: 463 PG/ML (ref 211–911)

## 2021-02-11 PROCEDURE — 82607 VITAMIN B-12: CPT

## 2021-02-11 PROCEDURE — 82746 ASSAY OF FOLIC ACID SERUM: CPT

## 2021-02-11 PROCEDURE — 99214 OFFICE O/P EST MOD 30 MIN: CPT | Performed by: NURSE PRACTITIONER

## 2021-02-11 PROCEDURE — 36415 COLL VENOUS BLD VENIPUNCTURE: CPT

## 2021-02-11 RX ORDER — AMLODIPINE BESYLATE 2.5 MG/1
2.5 TABLET ORAL DAILY
COMMUNITY
End: 2021-04-20

## 2021-02-11 RX ORDER — ATORVASTATIN CALCIUM 40 MG/1
40 TABLET, FILM COATED ORAL EVERY EVENING
Qty: 90 TABLET | Refills: 4 | Status: SHIPPED | OUTPATIENT
Start: 2021-02-11 | End: 2021-09-23

## 2021-02-11 ASSESSMENT — FIBROSIS 4 INDEX: FIB4 SCORE: 1.78

## 2021-02-11 NOTE — PROGRESS NOTES
Chief Complaint   Patient presents with   • Follow-Up   • Diabetes   • Requesting Labs   • Medication Management       Subjective:   Bailey White is a 80 y.o. female here today for evaluation and management of:    Type 2 diabetes mellitus with hyperglycemia, without long-term current use of insulin ([facility])  Results for RENEA WHITE (MRN [id number]) as of 2/11/2021 13:52   Ref. Range 2/9/2021 08:01   Glycohemoglobin Latest Ref Range: 0.0 - 5.6 % 9.8 (H)   Estim. Avg Glu Latest Units: mg/dL 235   Down from 12.8.  Would like a continuous glucose monitor.  Continues with glipizide. Would like to think about re-starting januvia 10 mg.      Macrocytic anemia  New finding on labs.  Elevated MCV.  Has gastroparesis.  Will check levels.     Overflow incontinence of urine  Patient reports worsening issue with urinary incontinence.  Urine today did not show any acute infection.  She is taking cranberry pills.  Diabetes is in better control.  Unable to afford oxybutinin or myrbetriq.  Using pads and depends throughout the day.      As stated in hpi    Current medicines (including changes today)  Current Outpatient Medications   Medication Sig Dispense Refill   • amLODIPine (NORVASC) 2.5 MG Tab Take 2.5 mg by mouth every day.     • atorvastatin (LIPITOR) 40 MG Tab Take 1 tablet by mouth every evening. 90 tablet 4   • Continuous Glucose Monitor Sup Kit 1 Each 3 times a day. 1 Kit 0   • glipiZIDE SR (GLUCOTROL) 5 MG TABLET SR 24 HR Take 1 Tab by mouth 2 Times a Day. 180 Tab 0   • polyethylene glycol/lytes (MIRALAX) 17 g Pack Take 17 g by mouth every day.     • docusate sodium (COLACE) 100 MG Cap Take 100 mg by mouth 2 times a day.     • Omega-3 Fatty Acids (OMEGA-3 PO) Take 1 Cap by mouth every day.     • clopidogrel (PLAVIX) 75 MG Tab Take 1 Tab by mouth every day. 90 Tab 3   • levothyroxine (SYNTHROID) 75 MCG Tab TAKE 1 TABLET EVERY MORNING ON AN EMPTY STOMACH 90 Tab 4   • acyclovir (ZOVIRAX) 200 MG Cap TAKE 1  "CAPSULE EVERY DAY 90 Cap 4   • omeprazole (PRILOSEC) 40 MG delayed-release capsule Take 1 Cap by mouth 2 times a day. 180 Cap 3   • Coenzyme Q10 (CO Q 10 PO) Take 1 Tab by mouth every day.     • Cranberry 200 MG Cap Take 1 Cap by mouth 2 Times a Day.     • Magnesium Gluconate (MAGNESIUM 27 PO) Take 1 Tab by mouth every day.     • Cholecalciferol (VITAMIN D) 2000 UNITS CAPS Take 2,000 Units by mouth every day.       No current facility-administered medications for this visit.     She  has a past medical history of Anesthesia, Arthritis, Blood transfusion, Bronchitis (2011), Carotid stenosis, CATARACT, CVA (cerebral vascular accident) ([facility]), Diabetes, Fibromyalgia, Fibromyalgia, GERD (gastroesophageal reflux disease), Heart burn, Herpes genital, Hiatus hernia syndrome, HTN, transient ischemic attack (TIA), Indigestion, Insomnia, Migraine, Pain, Seizure ([facility]), Thyroid disease, TIA, Unspecified hemorrhagic conditions, Unspecified urinary incontinence, and Urinary bladder disorder. She also has no past medical history of ASTHMA or Breast cancer ([facility]).    ROS as stated in hpi  No chest pain, no shortness of breath, no abdominal pain       Objective:     /78 (BP Location: Left arm, Patient Position: Sitting)   Pulse 88   Temp 36.1 °C (97 °F) (Temporal)   Resp 18   Ht 1.6 m (5' 3\")   Wt 75.3 kg (166 lb)   SpO2 96%  Body mass index is 29.41 kg/m². stable.   Physical Exam:  Constitutional: Alert, no distress.  Skin: Warm, dry, good turgor,no cyanosis, no rashes in visible areas.  Eye: Equal, round and reactive, conjunctiva clear, lids normal.  Neck: Trachea midline, no masses, no obvious thyroid enlargement.. No cervical or supraclavicular lymphadenopathy. Range of motion within normal limits.  Neuro: Cranial nerves 2-12 grossly intact.  No sensory deficit.  Respiratory: Unlabored respiratory effort, lungs clear to auscultation, no wheezes, no ronchi.  Cardiovascular: Normal S1, S2, no murmur, no edema.  Psych: " Alert and oriented x3, normal affect and mood and judgement.        Assessment and Plan:   The following treatment plan was discussed    1. Type 2 diabetes mellitus with hyperglycemia, without long-term current use of insulin (HCC)  Chronic, ongoing. a1c is coming down from 12.8 to 9.8.  Continue glipizide bid.  Recommend either adding januvia 10 mg or metformin ER to see if her stomach can tolerate.  Kidney function is WNL.  She will let me know which one she wants to try.  Recheck in 3 months A1c.  Patient requesting continuous glucose monitor as she knits and crochets and cannot do these with the finger sticks to her hands.  Monitor and follow.     2. Macrocytic anemia  Acute.  Finding on recent CBC.  Will check B12 and folate levels.  No alcohol reported.  Monitor and follow.   - VITAMIN B12; Future  - FOLATE; Future    3.  Overflow incontinence of urine  Chronic, ongoing, worsening.  Discussed setting a timer to remind her to urinate frequently.  Discussed skin care with the use of depends/pads to avoid breakdown.        Followup: Return in about 3 months (around 5/11/2021) for A1c check.         Educated in proper administration of medication(s) ordered today including safety, possible SE, risks, benefits, rationale and alternatives to therapy.     Please note that this dictation was created using voice recognition software. I have made every reasonable attempt to correct obvious errors, but I expect that there are errors of grammar and possibly content that I did not discover before finalizing the note.

## 2021-03-11 ENCOUNTER — TELEPHONE (OUTPATIENT)
Dept: MEDICAL GROUP | Facility: PHYSICIAN GROUP | Age: 81
End: 2021-03-11

## 2021-03-11 NOTE — TELEPHONE ENCOUNTER
1. Caller Name: [employer]                        Call Back Number: [phone]      How would the patient prefer to be contacted with a response:      Spoke the Rep. for this supply company.She stated need current Chart Notes. Cannot be older than six month and has to mention in the chart notes about her incontinence and needing these supplies.

## 2021-03-11 NOTE — TELEPHONE ENCOUNTER
Phone Number Called: Fax [phone]    Call outcome: Chart Notes have been updated by PCP and the Cart notes has been faxed to [facility]  Medical Supplies    Message:

## 2021-04-20 DIAGNOSIS — E11.9 DIABETES MELLITUS TYPE 2, NONINSULIN DEPENDENT (HCC): ICD-10-CM

## 2021-04-20 RX ORDER — GLIPIZIDE 5 MG/1
TABLET, FILM COATED, EXTENDED RELEASE ORAL
Qty: 180 TABLET | Refills: 3 | Status: SHIPPED | OUTPATIENT
Start: 2021-04-20

## 2021-04-20 RX ORDER — AMLODIPINE BESYLATE 2.5 MG/1
TABLET ORAL
Qty: 90 TABLET | Refills: 3 | Status: SHIPPED | OUTPATIENT
Start: 2021-04-20

## 2021-04-20 RX ORDER — OMEPRAZOLE 40 MG/1
CAPSULE, DELAYED RELEASE ORAL
Qty: 180 CAPSULE | Refills: 3 | Status: SHIPPED | OUTPATIENT
Start: 2021-04-20 | End: 2022-03-25 | Stop reason: SDUPTHER

## 2021-04-20 NOTE — TELEPHONE ENCOUNTER
Received request via: Pharmacy    Was the patient seen in the last year in this department? Yes 2/11/21    Does the patient have an active prescription (recently filled or refills available) for medication(s) requested? No

## 2021-04-20 NOTE — TELEPHONE ENCOUNTER
Requested Prescriptions     Signed Prescriptions Disp Refills   • amLODIPine (NORVASC) 2.5 MG Tab 90 tablet 3     Sig: TAKE 1 TABLET EVERY DAY     Authorizing Provider: PRATEEK HART   • glipiZIDE SR (GLUCOTROL) 5 MG TABLET SR 24  tablet 3     Sig: TAKE 1 TABLET TWICE DAILY     Authorizing Provider: PRATEEK HART   • omeprazole (PRILOSEC) 40 MG delayed-release capsule 180 capsule 3     Sig: TAKE 1 CAPSULE TWICE DAILY     Authorizing Provider: PRATEEK HART A.P.R.N.

## 2021-05-11 ENCOUNTER — OFFICE VISIT (OUTPATIENT)
Dept: MEDICAL GROUP | Facility: PHYSICIAN GROUP | Age: 81
End: 2021-05-11
Payer: MEDICARE

## 2021-05-11 VITALS
HEART RATE: 80 BPM | WEIGHT: 166 LBS | TEMPERATURE: 97.3 F | BODY MASS INDEX: 29.41 KG/M2 | RESPIRATION RATE: 18 BRPM | HEIGHT: 63 IN | DIASTOLIC BLOOD PRESSURE: 60 MMHG | OXYGEN SATURATION: 95 % | SYSTOLIC BLOOD PRESSURE: 110 MMHG

## 2021-05-11 DIAGNOSIS — E11.65 TYPE 2 DIABETES MELLITUS WITH HYPERGLYCEMIA, WITHOUT LONG-TERM CURRENT USE OF INSULIN (HCC): ICD-10-CM

## 2021-05-11 DIAGNOSIS — E11.65 UNCONTROLLED TYPE 2 DIABETES MELLITUS WITH HYPERGLYCEMIA (HCC): ICD-10-CM

## 2021-05-11 DIAGNOSIS — F51.01 PRIMARY INSOMNIA: ICD-10-CM

## 2021-05-11 LAB
HBA1C MFR BLD: 10.9 % (ref 0–5.6)
INT CON NEG: NEGATIVE
INT CON POS: POSITIVE

## 2021-05-11 PROCEDURE — 83036 HEMOGLOBIN GLYCOSYLATED A1C: CPT | Performed by: NURSE PRACTITIONER

## 2021-05-11 PROCEDURE — 99213 OFFICE O/P EST LOW 20 MIN: CPT | Performed by: NURSE PRACTITIONER

## 2021-05-11 RX ORDER — FLASH GLUCOSE SENSOR
1 KIT MISCELLANEOUS
Qty: 6 EACH | Refills: 3 | Status: SHIPPED | OUTPATIENT
Start: 2021-05-11 | End: 2021-05-18 | Stop reason: SDUPTHER

## 2021-05-11 ASSESSMENT — FIBROSIS 4 INDEX: FIB4 SCORE: 1.8

## 2021-05-11 NOTE — PROGRESS NOTES
Chief Complaint   Patient presents with   • Follow-Up   • Diabetes   • Medication Management     amloidpine, atorvastatin        Subjective:   Bailey White is a 81 y.o. female here today for evaluation and management of:    Type 2 diabetes mellitus with hyperglycemia, without long-term current use of insulin (HCC)  Here for follow up A1c check today.  Taking glipizide, monitoring blood sugars.  Reports that her pharmacy did not send to her, was without 3 weeks.  A1c 10.9 today.  BS running high at home.  Reports that she is still eating sweets and treats.  Requesting continuous glucose monitor,  Will resend another request. Daughter reports she is eating bread at lunch and breakfast along with eating cookies all night long.      Insomnia  Reports one tylenol pm one nightly.  Reports some fatigue         Current medicines (including changes today)  Current Outpatient Medications   Medication Sig Dispense Refill   • amLODIPine (NORVASC) 2.5 MG Tab TAKE 1 TABLET EVERY DAY 90 tablet 3   • glipiZIDE SR (GLUCOTROL) 5 MG TABLET SR 24 HR TAKE 1 TABLET TWICE DAILY 180 tablet 3   • omeprazole (PRILOSEC) 40 MG delayed-release capsule TAKE 1 CAPSULE TWICE DAILY 180 capsule 3   • atorvastatin (LIPITOR) 40 MG Tab Take 1 tablet by mouth every evening. 90 tablet 4   • Continuous Glucose Monitor Sup Kit 1 Each 3 times a day. 1 Kit 0   • polyethylene glycol/lytes (MIRALAX) 17 g Pack Take 17 g by mouth every day.     • docusate sodium (COLACE) 100 MG Cap Take 100 mg by mouth 2 times a day.     • Omega-3 Fatty Acids (OMEGA-3 PO) Take 1 Cap by mouth every day.     • clopidogrel (PLAVIX) 75 MG Tab Take 1 Tab by mouth every day. 90 Tab 3   • levothyroxine (SYNTHROID) 75 MCG Tab TAKE 1 TABLET EVERY MORNING ON AN EMPTY STOMACH 90 Tab 4   • acyclovir (ZOVIRAX) 200 MG Cap TAKE 1 CAPSULE EVERY DAY 90 Cap 4   • Coenzyme Q10 (CO Q 10 PO) Take 1 Tab by mouth every day.     • Cranberry 200 MG Cap Take 1 Cap by mouth 2 Times a Day.    "  • Magnesium Gluconate (MAGNESIUM 27 PO) Take 1 Tab by mouth every day.     • Cholecalciferol (VITAMIN D) 2000 UNITS CAPS Take 2,000 Units by mouth every day.       No current facility-administered medications for this visit.     She  has a past medical history of Anesthesia, Arthritis, Blood transfusion, Bronchitis (2011), Carotid stenosis, CATARACT, CVA (cerebral vascular accident) ([facility]), Diabetes, Fibromyalgia, Fibromyalgia, GERD (gastroesophageal reflux disease), Heart burn, Herpes genital, Hiatus hernia syndrome, HTN, transient ischemic attack (TIA), Indigestion, Insomnia, Migraine, Pain, Seizure ([facility]), Thyroid disease, TIA, Unspecified hemorrhagic conditions, Unspecified urinary incontinence, and Urinary bladder disorder. She also has no past medical history of ASTHMA or Breast cancer ([facility]).    ROS as stated in hpi  No chest pain, no shortness of breath, no abdominal pain       Objective:     /60 (BP Location: Left arm, Patient Position: Sitting)   Pulse 80   Temp 36.3 °C (97.3 °F) (Temporal)   Resp 18   Ht 1.6 m (5' 3\")   Wt 75.3 kg (166 lb)   SpO2 95%  Body mass index is 29.41 kg/m².   Physical Exam:  Constitutional: Alert, no distress.  Skin: Warm, dry, good turgor,no cyanosis, no rashes in visible areas.  Eye: Equal, round and reactive, conjunctiva clear, lids normal.  Neuro: Cranial nerves 2-12 grossly intact.  No sensory deficit.  Respiratory: Unlabored respiratory effort,Psych: Alert and oriented x3, normal affect and mood and judgement.        Assessment and Plan:   The following treatment plan was discussed    1. Type 2 diabetes mellitus with hyperglycemia, without long-term current use of insulin ([facility])  Chronic, worsening uncontrolled.  Glipizide is the only thing she will take at this time.  Referral to pharmacy for consultation for other medications that may improve her glucose control.  Advised patient that she had to stop eating cookies all night long while she watches tv.  Daughter " is portion controlling other foods.  Recheck in 3 months.  Requesting continuous glucose monitoring.   - REFERRAL TO PHARMACOTHERAPY SERVICE  - Diabetic Monofilament LE Exam    2. Primary insomnia  Chronic, ongoing, using some tylenol pm.  Advised risks in elderly.  Patient chooses to continue to use.  Monitor and follow.       Followup: No follow-ups on file.         Educated in proper administration of medication(s) ordered today including safety, possible SE, risks, benefits, rationale and alternatives to therapy.     Please note that this dictation was created using voice recognition software. I have made every reasonable attempt to correct obvious errors, but I expect that there are errors of grammar and possibly content that I did not discover before finalizing the note.

## 2021-05-11 NOTE — ASSESSMENT & PLAN NOTE
Here for follow up A1c check today.  Taking glipizide, monitoring blood sugars.  Reports that her pharmacy did not send to her, was without 3 weeks.  A1c 10.9 today.  BS running high at home.  Reports that she is still eating sweets and treats.  Requesting continuous glucose monitor,  Will resend another request. Daughter reports she is eating bread at lunch and breakfast along with eating cookies all night long.

## 2021-05-11 NOTE — LETTER
May 11, 2021         Patient: Bailey White   YOB: 1940   Date of Visit: 5/11/2021           To Whom it May Concern:    Bailey White was seen in my clinic on 5/11/2021.Due to her chronic, worsening medical conditions, she requires a handicap parking space to allow safe ambulation to her medical appointments.      If you have any questions or concerns, please don't hesitate to call.        Sincerely,           JACE Barfield.  Electronically Signed

## 2021-05-12 ENCOUNTER — TELEPHONE (OUTPATIENT)
Dept: VASCULAR LAB | Facility: MEDICAL CENTER | Age: 81
End: 2021-05-12

## 2021-05-12 NOTE — TELEPHONE ENCOUNTER
Left voicemail message for patient to return call to [facility] to establish care      MARCO ANTONIO Vieyra, Clinical Pharmacist, CDE, CACP

## 2021-05-18 ENCOUNTER — NON-PROVIDER VISIT (OUTPATIENT)
Dept: MEDICAL GROUP | Facility: PHYSICIAN GROUP | Age: 81
End: 2021-05-18
Payer: MEDICARE

## 2021-05-18 PROCEDURE — 99211 OFF/OP EST MAY X REQ PHY/QHP: CPT | Performed by: FAMILY MEDICINE

## 2021-05-18 RX ORDER — FLASH GLUCOSE SENSOR
1 KIT MISCELLANEOUS
Qty: 6 EACH | Refills: 3 | Status: SHIPPED | OUTPATIENT
Start: 2021-05-18 | End: 2021-05-19 | Stop reason: SDUPTHER

## 2021-05-18 NOTE — TELEPHONE ENCOUNTER
Received request via: Pharmacy    Was the patient seen in the last year in this department? Yes    Does the patient have an active prescription (recently filled or refills available) for medication(s) requested? NEEDS DIAGNOSIS CODE ON RX

## 2021-05-18 NOTE — PROGRESS NOTES
Patient Consult Note - Initial Visit    TIME IN: 2:55pm  TIME OUT: 3:47pm    Primary care physician: RANDY Barfield    Reason for consult: Management of Uncontrolled Type 2 Diabetes    HPI:  Bailey White is a 81 y.o. old patient who comes in today for evaluation of above stated problem.    Most Recent HbA1c:   Lab Results   Component Value Date/Time    HBA1C 10.9 (A) 05/11/2021 02:55 PM      Lab Results   Component Value Date/Time    CREATININE 0.89 02/09/2021 08:01 AM    CREATININE 1.0 03/06/2009 09:50 AM              Diabetes Medication History and Current Regimen    Sulfonylurea: Glipizide ER 5mg BID      Pt has home glucometer and proper testing technique - Yes, Venu continuous monitor    Pt reports blood sugars:   Before Breakfast: this AM was 299, no other readings logged or disclosed at today's visit.  Before Lunch:   Before Dinner:   Before Bedtime:   Other times:     Hypoglycemia awareness - Yes  Nocturnal hypoglycemia- None  Hypoglycemia:  None    Current Exercise - None  Exercise Goal - Patient has very unsteady gait and unable to commit to any routine exercise/activity.    Dietary - Strong clinical suspicion that diabetic state highly driven by current dietary habits, despite patient feeling her snacking and bread intake are minimal.  Daughter endorses patient eating white bread throughout the day, usually two pieces at a time.  Multiple snacks throughout the day as well.  She states she has gluten allergy, therefore cannot eat whole grains, although she has no problems with white breads.    Pt reports eating:  Breakfast - two slices of toast  Snack - cookies, mini cakes  Lunch - sandwich with at least two slices of bread  Snack - cookies, mini cakes  Dinner - unclear  Snack - cookies and snacks    Foot Exam:  Monofilament exam - not conducted at today's visit, up to date    Preventative Management  BP regimen (ACE/ARB) - none  ASA - none  Statin - Atorvastatin 40mg daily  Last  Retinal Scan - 1/2021  Last Foot Exam - 5/2021  Last A1c -   Lab Results   Component Value Date/Time    HBA1C 10.9 (A) 05/11/2021 02:55 PM      Last Microalbuminuria - 2/2021    updated caregaps    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   • Reactive depression 07/16/2018   • Osteopenia determined by x-ray 04/10/2018   • Chronic pain of left knee 03/22/2018   • Palpitations 12/18/2017   • Uncontrolled type 2 diabetes mellitus with hyperglycemia (HCC) 02/12/2015   • Vitamin d deficiency 10/18/2012   • Hypothyroidism 05/04/2010   • Fibromyalgia    • Insomnia    • CVA (Cerebral Vascular Accident), h/o    • Carotid stenosis    • Essential hypertension        Past Surgical History:  Past Surgical History:   Procedure Laterality Date   • PB THROMBOENDARTECTMY NECK,NECK INCIS Left 10/7/2020    Procedure: ENDARTERECTOMY, CAROTID;  Surgeon: Rene Verma M.D.;  Location: SURGERY [facility];  Service: Vascular   • HYSTEROSCOPY WITH VIDEO DIAGNOSTIC  9/5/2013    Performed by Portillo Barragan M.D. at SURGERY SAME DAY [facility] ORS   • DILATION AND CURETTAGE  9/5/2013    Performed by Portillo Barragan M.D. at SURGERY SAME DAY [facility] ORS   • OTHER      cataracts   • OTHER ORTHOPEDIC SURGERY     • TONSILLECTOMY         Allergies:  Asa [aspirin], Ciprofloxacin, Clindamycin, Codeine, Food, Hydrocortisone, Ibuprofen, Imitrex [sumatriptan succinate], Inderal [propranolol hcl], Lyrica, Mellaril, Other drug, Pcn [penicillins], Ropinirole hcl [requip],  Shellfish allergy, Sulfa drugs, and Tagamet [cimetidine]    Social History:  Social History     Socioeconomic History   • Marital status: Single     Spouse name: Not on file   • Number of children: Not on file   • Years of education: Not on file   • Highest education level: Not on file   Occupational History   • Not on file   Tobacco Use   • Smoking status: Former Smoker     Packs/day: 3.00     Years: 1.00     Pack years: 3.00     Types: Cigarettes     Quit date: 1955     Years since quittin.4   • Smokeless tobacco: Never Used   Vaping Use   • Vaping Use: Never used   Substance and Sexual Activity   • Alcohol use: No     Alcohol/week: 0.0 oz   • Drug use: No   • Sexual activity: Never   Other Topics Concern   • Not on file   Social History Narrative   • Not on file     Social Determinants of Health     Financial Resource Strain:    • Difficulty of Paying Living Expenses:    Food Insecurity:    • Worried About Running Out of Food in the Last Year:    • Ran Out of Food in the Last Year:    Transportation Needs:    • Lack of Transportation (Medical):    • Lack of Transportation (Non-Medical):    Physical Activity:    • Days of Exercise per Week:    • Minutes of Exercise per Session:    Stress:    • Feeling of Stress :    Social Connections:    • Frequency of Communication with Friends and Family:    • Frequency of Social Gatherings with Friends and Family:    • Attends Anabaptist Services:    • Active Member of Clubs or Organizations:    • Attends Club or Organization Meetings:    • Marital Status:    Intimate Partner Violence:    • Fear of Current or Ex-Partner:    • Emotionally Abused:    • Physically Abused:    • Sexually Abused:        Family History:  Family History   Problem Relation Age of Onset   • Heart Disease Father    • Cancer Maternal Grandmother        Medications:    Current Outpatient Medications:   •  Continuous Blood Gluc  (FREESTYLE JONY 2 READER) Device, 1 Each 3 times a day before  meals., Disp: 1 Each, Rfl: 0  •  Continuous Blood Gluc Sensor (FREESTYLE JONY 14 DAY SENSOR) Misc, 1 Each every 14 days., Disp: 6 Each, Rfl: 3  •  amLODIPine (NORVASC) 2.5 MG Tab, TAKE 1 TABLET EVERY DAY, Disp: 90 tablet, Rfl: 3  •  glipiZIDE SR (GLUCOTROL) 5 MG TABLET SR 24 HR, TAKE 1 TABLET TWICE DAILY, Disp: 180 tablet, Rfl: 3  •  omeprazole (PRILOSEC) 40 MG delayed-release capsule, TAKE 1 CAPSULE TWICE DAILY, Disp: 180 capsule, Rfl: 3  •  atorvastatin (LIPITOR) 40 MG Tab, Take 1 tablet by mouth every evening., Disp: 90 tablet, Rfl: 4  •  Continuous Glucose Monitor Sup Kit, 1 Each 3 times a day., Disp: 1 Kit, Rfl: 0  •  polyethylene glycol/lytes (MIRALAX) 17 g Pack, Take 17 g by mouth every day., Disp: , Rfl:   •  docusate sodium (COLACE) 100 MG Cap, Take 100 mg by mouth 2 times a day., Disp: , Rfl:   •  Omega-3 Fatty Acids (OMEGA-3 PO), Take 1 Cap by mouth every day., Disp: , Rfl:   •  clopidogrel (PLAVIX) 75 MG Tab, Take 1 Tab by mouth every day., Disp: 90 Tab, Rfl: 3  •  levothyroxine (SYNTHROID) 75 MCG Tab, TAKE 1 TABLET EVERY MORNING ON AN EMPTY STOMACH, Disp: 90 Tab, Rfl: 4  •  acyclovir (ZOVIRAX) 200 MG Cap, TAKE 1 CAPSULE EVERY DAY, Disp: 90 Cap, Rfl: 4  •  Coenzyme Q10 (CO Q 10 PO), Take 1 Tab by mouth every day., Disp: , Rfl:   •  Cranberry 200 MG Cap, Take 1 Cap by mouth 2 Times a Day., Disp: , Rfl:   •  Magnesium Gluconate (MAGNESIUM 27 PO), Take 1 Tab by mouth every day., Disp: , Rfl:   •  Cholecalciferol (VITAMIN D) 2000 UNITS CAPS, Take 2,000 Units by mouth every day., Disp: , Rfl:     Labs: Reviewed    Physical Examination:  Vital signs: LMP 01/01/1983  There is no height or weight on file to calculate BMI.    Assessment and Plan:    1. DM2  Patient seen today for initial visit, referred by PCP with worsening A1c.  Long standing hx of uncontrolled DM, heavily influenced by diet and inability to tolerate medications.  Significant hx of ASCVD, both personally and within family.  Daughter does  majority of care taking.  Patient eats a significant amount of simple carbs and snacks.  She downplays how much she consuming, while daughter confirms it is quite a bit.  She has unsteady dentures, and hx of multiple GI problems, therefore feels she is sensitive to most foods except soft bread and cookies.  Has failed metformin in the past, no other medications listed or what patient can recall.    Discussed several pharmacotherapy options with patient, at this time SGLT2i therapy seems to be most appropriate.  Unable to spend much time discussing diabetes as a disease state as patient is a bit of a talker, but very pleasant woman.    - Medication changes -   Start Jardiance 10mg daily.  Decrease glipizide to once daily in the PM.  Continue all other medications for now.     - Lifestyle changes -   Diet:  Discussed need for drastic TLC and cut back on bread and sweets.  Patient does not believe she consumes too much sugar or bread, but current A1c and deteriorating blood glucoses indicated otherwise.  Daughter states they are actively making changes and believes once solid lower dentures are in place, diet can be modified further.  Will continue to follow and make recommendations at next visit.    Exercise:  D/t gait issues, unable to commit to exercise routine.    Follow Up:  Five weeks as she will be away visiting family for the next month.    Iain Kim, PharmD, [facility]  05/18/21    CC:   JACE Barfield.                                                   [facility] Pharmacotherapy Program Consent      Name    Bailey White    MRN number: [id number]    the following are guidelines for participation in the [facility] Pharmacotherapy Program.     I, ____Bailey White_____, understand and voluntarily agree to participate in the [facility] Pharmacotherapy Program and to have services provided to me by pharmacists working in collaboration with my provider.    I understand the  pharmacist within the [facility] may initiate, modify or discontinue my medications, order appropriate testing and appointments, perform exams, monitor treatment, and make clinical evaluations and decisions pursuant to a collaborative practice agreement with my provider.  I understand the pharmacist within the [facility] is not a physician, osteopathic physician, advanced practice registered nurse or physician assistant and may not diagnose.  I will take all my medications as instructed and not change the way I take it without first talking to my provider or a pharmacist within the [facility].  I understand that if I am late to my appointment I may not be able to be seen by a pharmacist at that time and will have to reschedule my appointment.  During appointment with pharmacist I understand that pharmacist has the right not to answer questions or perform services outside the pharmacist’s scope of practice.  By signing below, I provide informed consent for the pharmacist to provide these services and for my participation in the [facility].      Bailey Lama Destecroix           [id number]          05/18/21  Patient Name                   MRN number  Date     ___X_Obtained verbal consent from pt, No signature due to COVID concerns___   Patient Signature

## 2021-05-18 NOTE — TELEPHONE ENCOUNTER
Requested Prescriptions     Signed Prescriptions Disp Refills   • Continuous Blood Gluc  (FREESTYLE JONY 2 READER) Device 1 Each 0     Si Each 3 times a day before meals.     Authorizing Provider: PRATEEK HART   • Continuous Blood Gluc Sensor (FREESTYLE JONY 14 DAY SENSOR) Misc 6 Each 3     Si Each every 14 days.     Authorizing Provider: PRATEEK HART A.P.R.N.

## 2021-05-19 DIAGNOSIS — E11.65 UNCONTROLLED TYPE 2 DIABETES MELLITUS WITH HYPERGLYCEMIA (HCC): ICD-10-CM

## 2021-05-19 RX ORDER — FLASH GLUCOSE SENSOR
1 KIT MISCELLANEOUS
Qty: 6 EACH | Refills: 3 | Status: SHIPPED
Start: 2021-05-19 | End: 2021-09-23

## 2021-05-19 RX ORDER — EMPAGLIFLOZIN 10 MG/1
1 TABLET, FILM COATED ORAL DAILY
Qty: 14 TABLET | Refills: 0 | Status: SHIPPED | OUTPATIENT
Start: 2021-05-19 | End: 2021-08-06

## 2021-05-19 NOTE — TELEPHONE ENCOUNTER
Received request via: Patient    Was the patient seen in the last year in this department? Yes    Does the patient have an active prescription (recently filled or refills available) for medication(s) requested? PATIENT WOULD LIKE TO TRY [facility]STEAD OF [facility]

## 2021-06-04 DIAGNOSIS — E11.65 UNCONTROLLED TYPE 2 DIABETES MELLITUS WITH HYPERGLYCEMIA (HCC): ICD-10-CM

## 2021-06-04 RX ORDER — EMPAGLIFLOZIN 10 MG/1
1 TABLET, FILM COATED ORAL DAILY
Qty: 14 TABLET | Refills: 0 | OUTPATIENT
Start: 2021-06-04

## 2021-06-16 DIAGNOSIS — L98.9 SKIN LESION OF FACE: ICD-10-CM

## 2021-06-16 DIAGNOSIS — R93.89 THICKENED ENDOMETRIUM: ICD-10-CM

## 2021-06-16 RX ORDER — LEVOTHYROXINE SODIUM 0.07 MG/1
TABLET ORAL
Qty: 90 TABLET | Refills: 3 | Status: SHIPPED | OUTPATIENT
Start: 2021-06-16

## 2021-06-16 RX ORDER — CLOPIDOGREL BISULFATE 75 MG/1
TABLET ORAL
Qty: 90 TABLET | Refills: 3 | Status: SHIPPED | OUTPATIENT
Start: 2021-06-16

## 2021-06-16 RX ORDER — ACYCLOVIR 200 MG/1
CAPSULE ORAL
Qty: 90 CAPSULE | Refills: 3 | Status: SHIPPED | OUTPATIENT
Start: 2021-06-16

## 2021-06-16 NOTE — TELEPHONE ENCOUNTER
Requested Prescriptions     Signed Prescriptions Disp Refills   • levothyroxine (SYNTHROID) 75 MCG Tab 90 tablet 3     Sig: TAKE 1 TABLET EVERY MORNING ON AN EMPTY STOMACH     Authorizing Provider: PRATEEK HART   • acyclovir (ZOVIRAX) 200 MG Cap 90 capsule 3     Sig: TAKE 1 CAPSULE EVERY DAY     Authorizing Provider: PRATEEK HART   • clopidogrel (PLAVIX) 75 MG Tab 90 tablet 3     Sig: TAKE 1 TABLET EVERY DAY     Authorizing Provider: PRATEEK HART A.P.R.N.

## 2021-07-16 NOTE — TELEPHONE ENCOUNTER
VOICEMAIL  1. Caller Name: Pharmacy                      Call Back Number: [phone]    2. Message: [facility] called asking the status of a patient requested prescription, patient is requesting Diclofenac 1% and Lidocaine which have not been prescribed by PCP before. Reference #[id number]    3. Patient approves office to leave a detailed voicemail/MyChart message: N\A    Please advise

## 2021-07-26 ENCOUNTER — TELEPHONE (OUTPATIENT)
Dept: MEDICAL GROUP | Facility: PHYSICIAN GROUP | Age: 81
End: 2021-07-26

## 2021-07-26 NOTE — TELEPHONE ENCOUNTER
VOICEMAIL  1. Caller Name: Brennon                      Call Back Number: [phone] (home)       2. Message: Patient is wondering if she needs labs done prior to her appointment in August. Please advise     3. Patient approves office to leave a detailed voicemail/MyChart message: N\A

## 2021-07-27 NOTE — TELEPHONE ENCOUNTER
Phone Number Called: [phone] (home)       Call outcome: Spoke to patient regarding message below.    Message: Per chart A1c due at next appointment.  We will have MA inform patient that they can complete in office.  Health maintenance labs up-to-date.

## 2021-07-27 NOTE — TELEPHONE ENCOUNTER
Per chart A1c due at next appointment.  We will have MA inform patient that they can complete in office.  Health maintenance labs up-to-date.

## 2021-08-05 DIAGNOSIS — E11.65 UNCONTROLLED TYPE 2 DIABETES MELLITUS WITH HYPERGLYCEMIA (HCC): ICD-10-CM

## 2021-08-06 ENCOUNTER — DOCUMENTATION (OUTPATIENT)
Dept: VASCULAR LAB | Facility: MEDICAL CENTER | Age: 81
End: 2021-08-06

## 2021-08-06 RX ORDER — EMPAGLIFLOZIN 10 MG/1
1 TABLET, FILM COATED ORAL DAILY
Qty: 30 TABLET | Refills: 0 | Status: SHIPPED | OUTPATIENT
Start: 2021-08-06 | End: 2021-09-23

## 2021-08-06 NOTE — PROGRESS NOTES
The patient does not have any pharmacy benefits on file. Process eligbility check in [place]. No results found. Contacted [facility] a [phone] to confirm if the patient has coverage on file. [facility] confirmed the patient has [employer]. [facility] is not in contract with the plan. Releasing to [facility].

## 2021-08-10 ENCOUNTER — NON-PROVIDER VISIT (OUTPATIENT)
Dept: MEDICAL GROUP | Facility: PHYSICIAN GROUP | Age: 81
End: 2021-08-10
Payer: MEDICARE

## 2021-08-10 PROCEDURE — 99999 PR NO CHARGE: CPT | Performed by: FAMILY MEDICINE

## 2021-08-10 PROCEDURE — 99211 OFF/OP EST MAY X REQ PHY/QHP: CPT | Performed by: FAMILY MEDICINE

## 2021-08-10 NOTE — PROGRESS NOTES
Patient Consult Note - Follow Up Visit  Primary care physician: RANDY Barfield    Reason for consult: Management of Uncontrolled Type 2 Diabetes    Time IN:  3:03pm  Time OUT:  3:57pm    HPI:  Bailey White is a 81 y.o. old patient who comes in today for evaluation of above stated problem.    Most Recent HbA1c:   Lab Results   Component Value Date/Time    HBA1C 10.9 (A) 05/11/2021 02:55 PM        Current Diabetes Regimen:    Glipizide 5mg BID    Before Breakfast: 121-168  Before Lunch:  Before Dinner:  Before Bedtime:  Other times:  Hypoglycemia:  None    Foot Exam:  Monofilament exam - up to date, not conducted today      Preventative Management  BP regimen (ACE/ARB) - none  ASA - none, clopidogrel  Statin - recently self stopped atorvastatin, see below  Last Retinal Scan - 1/2021  Last Foot Exam - 5/2021  Last A1c -   Lab Results   Component Value Date/Time    HBA1C 10.9 (A) 05/11/2021 02:55 PM      Last Microalbuminuria - 2/2021    updated caregaps    ROS:  Constitutional: No weight loss  Cardiac: No palpitations or racing heart  Resp: No shortness of breath  Neuro: No numbness or tinging in feet  Endo: No heat or cold intolerance, no polyuria or polydipsia  All other systems were reviewed and were negative.    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   • Reactive depression 07/16/2018   • Osteopenia determined by x-ray 04/10/2018   • Chronic pain of left knee 03/22/2018   • Palpitations 12/18/2017   •  Uncontrolled type 2 diabetes mellitus with hyperglycemia (HCC) 2015   • Vitamin d deficiency 10/18/2012   • Hypothyroidism 2010   • Fibromyalgia    • Insomnia    • CVA (Cerebral Vascular Accident), h/o    • Carotid stenosis    • Essential hypertension        Past Surgical History:  Past Surgical History:   Procedure Laterality Date   • PB THROMBOENDARTECTMY NECK,NECK INCIS Left 10/7/2020    Procedure: ENDARTERECTOMY, CAROTID;  Surgeon: Rene Verma M.D.;  Location: SURGERY [facility];  Service: Vascular   • HYSTEROSCOPY WITH VIDEO DIAGNOSTIC  2013    Performed by Portillo Barragan M.D. at SURGERY SAME DAY [facility] ORS   • DILATION AND CURETTAGE  2013    Performed by Portillo Barragan M.D. at SURGERY SAME DAY [facility] ORS   • OTHER      cataracts   • OTHER ORTHOPEDIC SURGERY     • TONSILLECTOMY         Allergies:  Asa [aspirin], Ciprofloxacin, Clindamycin, Codeine, Food, Hydrocortisone, Ibuprofen, Imitrex [sumatriptan succinate], Inderal [propranolol hcl], Lyrica, Mellaril, Other drug, Pcn [penicillins], Ropinirole hcl [requip], Shellfish allergy, Sulfa drugs, and Tagamet [cimetidine]    Social History:  Social History     Socioeconomic History   • Marital status: Single     Spouse name: Not on file   • Number of children: Not on file   • Years of education: Not on file   • Highest education level: Not on file   Occupational History   • Not on file   Tobacco Use   • Smoking status: Former Smoker     Packs/day: 3.00     Years: 1.00     Pack years: 3.00     Types: Cigarettes     Quit date: 1955     Years since quittin.6   • Smokeless tobacco: Never Used   Vaping Use   • Vaping Use: Never used   Substance and Sexual Activity   • Alcohol use: No     Alcohol/week: 0.0 oz   • Drug use: No   • Sexual activity: Never   Other Topics Concern   • Not on file   Social History Narrative   • Not on file     Social Determinants of Health     Financial Resource Strain:    • Difficulty of Paying Living  Expenses:    Food Insecurity:    • Worried About Running Out of Food in the Last Year:    • Ran Out of Food in the Last Year:    Transportation Needs:    • Lack of Transportation (Medical):    • Lack of Transportation (Non-Medical):    Physical Activity:    • Days of Exercise per Week:    • Minutes of Exercise per Session:    Stress:    • Feeling of Stress :    Social Connections:    • Frequency of Communication with Friends and Family:    • Frequency of Social Gatherings with Friends and Family:    • Attends Rastafari Services:    • Active Member of Clubs or Organizations:    • Attends Club or Organization Meetings:    • Marital Status:    Intimate Partner Violence:    • Fear of Current or Ex-Partner:    • Emotionally Abused:    • Physically Abused:    • Sexually Abused:        Family History:  Family History   Problem Relation Age of Onset   • Heart Disease Father    • Cancer Maternal Grandmother        Medications:    Current Outpatient Medications:   •  JARDIANCE 10 MG Tab, TAKE 1 TABLET BY MOUTH EVERY DAY, Disp: 30 tablet, Rfl: 0  •  levothyroxine (SYNTHROID) 75 MCG Tab, TAKE 1 TABLET EVERY MORNING ON AN EMPTY STOMACH, Disp: 90 tablet, Rfl: 3  •  acyclovir (ZOVIRAX) 200 MG Cap, TAKE 1 CAPSULE EVERY DAY, Disp: 90 capsule, Rfl: 3  •  clopidogrel (PLAVIX) 75 MG Tab, TAKE 1 TABLET EVERY DAY, Disp: 90 tablet, Rfl: 3  •  Continuous Blood Gluc  (FREESTYLE JONY 2 READER) Device, 1 Each 3 times a day before meals., Disp: 1 Each, Rfl: 0  •  Continuous Blood Gluc Sensor (FREESTYLE JONY 14 DAY SENSOR) Misc, 1 Each every 14 days., Disp: 6 Each, Rfl: 3  •  amLODIPine (NORVASC) 2.5 MG Tab, TAKE 1 TABLET EVERY DAY, Disp: 90 tablet, Rfl: 3  •  glipiZIDE SR (GLUCOTROL) 5 MG TABLET SR 24 HR, TAKE 1 TABLET TWICE DAILY, Disp: 180 tablet, Rfl: 3  •  omeprazole (PRILOSEC) 40 MG delayed-release capsule, TAKE 1 CAPSULE TWICE DAILY, Disp: 180 capsule, Rfl: 3  •  atorvastatin (LIPITOR) 40 MG Tab, Take 1 tablet by mouth every  evening., Disp: 90 tablet, Rfl: 4  •  Continuous Glucose Monitor Sup Kit, 1 Each 3 times a day., Disp: 1 Kit, Rfl: 0  •  polyethylene glycol/lytes (MIRALAX) 17 g Pack, Take 17 g by mouth every day., Disp: , Rfl:   •  docusate sodium (COLACE) 100 MG Cap, Take 100 mg by mouth 2 times a day., Disp: , Rfl:   •  Omega-3 Fatty Acids (OMEGA-3 PO), Take 1 Cap by mouth every day., Disp: , Rfl:   •  Coenzyme Q10 (CO Q 10 PO), Take 1 Tab by mouth every day., Disp: , Rfl:   •  Cranberry 200 MG Cap, Take 1 Cap by mouth 2 Times a Day., Disp: , Rfl:   •  Magnesium Gluconate (MAGNESIUM 27 PO), Take 1 Tab by mouth every day., Disp: , Rfl:   •  Cholecalciferol (VITAMIN D) 2000 UNITS CAPS, Take 2,000 Units by mouth every day., Disp: , Rfl:     Labs: Reviewed    Physical Examination:  Vital signs: LMP 01/01/1983  There is no height or weight on file to calculate BMI.  General: No apparent distress, cooperative  Eyes: No scleral icterus or discharge  ENMT: Normal on external inspection of nose, lips, normal thyroid exam  Neck: No abnormal masses on inspection  Resp: Normal effort, clear to auscultation bilaterally   CVS: Regular rate and rhythm, S1 S2 normal, no murmur   Extremities: No edema  Abdomen: abdominal obesity present  Neuro: Alert and oriented  Skin: No rash  Psych: Normal mood and affect, intact memory and able to make informed decisions    Assessment and Plan:    Patient currently maintained on glipizide 5mg BID.  She has been actively working with her son to modify diet.  She self stopped atorvastatin d/t reading on-line that long term use of statin can lead to increase in blood glucose.  Spent great deal of time at today's visit discussing the role of diet, medications, and continuous monitoring of blood glucose in her case.  She has made improvements to diet and FBG has improved some as well, but explained to patient that because of her long standing DMII, that her eating during the day is likely driving her blood  glucose quite high post-prandially.  This is likely why her A1c continues to be elevated.  Discussed at length the need for additional blood glucose lowering medication, specifically Jardiance.    She is adamant about not starting Jardiance d/t her hx of gastroparesis and her reading that possible side effect may be upset stomach.  Stressed to patient that any exacerbation of gastroparesis is remote possibility with Jardiance and that DM and CV benefit of drug far outweighs small risk of upset stomach.  She still declines use.  Discussed current evidence regarding statin use in diabetic and that CV benefit (especially in her case given established ASCVD) far outweighs slight increases to blood glucose over time.  She declines to restart.    At this point, I will refer patient back to PCP and cardiologist for further management as she feels current medications and management are satisfactory for her despite labs showing otherwise.  Follow up with PCP on 8-12-21 and cardiology 8-11-21.  It would be my pleasure to meet with patient again in the future should she change her mind in regards to drug therapy.    Follow Up:  PRN    Thank you for allowing me to participate in the care of this patient.    Iain Kim, PharmD, BCACP  08/10/21    CC:   RANDY Barfield

## 2021-08-10 NOTE — Clinical Note
Kel Peralta,  I documented as thoroughly as possible with Brennon's visit today.  She is resistant to any sort of adjustments I suggested as feels her current course is working.  If there are any changes made at your visit with her or by cardiologist, certainly let me know, otherwise I'll d/c her from our services at this time.  Thanks.  Iain

## 2021-08-12 ENCOUNTER — OFFICE VISIT (OUTPATIENT)
Dept: MEDICAL GROUP | Facility: PHYSICIAN GROUP | Age: 81
End: 2021-08-12
Payer: MEDICARE

## 2021-08-12 ENCOUNTER — HOSPITAL ENCOUNTER (OUTPATIENT)
Dept: LAB | Facility: MEDICAL CENTER | Age: 81
End: 2021-08-12
Attending: NURSE PRACTITIONER
Payer: MEDICARE

## 2021-08-12 VITALS
HEIGHT: 63 IN | TEMPERATURE: 96.8 F | OXYGEN SATURATION: 96 % | SYSTOLIC BLOOD PRESSURE: 140 MMHG | RESPIRATION RATE: 18 BRPM | WEIGHT: 163 LBS | DIASTOLIC BLOOD PRESSURE: 82 MMHG | BODY MASS INDEX: 28.88 KG/M2 | HEART RATE: 94 BPM

## 2021-08-12 DIAGNOSIS — E03.9 HYPOTHYROIDISM, UNSPECIFIED TYPE: ICD-10-CM

## 2021-08-12 DIAGNOSIS — I63.6 CEREBROVASCULAR ACCIDENT (CVA) DUE TO NONPYOGENIC CEREBRAL VENOUS THROMBOSIS (HCC): ICD-10-CM

## 2021-08-12 DIAGNOSIS — R47.89 WORD FINDING DIFFICULTY: ICD-10-CM

## 2021-08-12 DIAGNOSIS — E11.65 UNCONTROLLED TYPE 2 DIABETES MELLITUS WITH HYPERGLYCEMIA (HCC): ICD-10-CM

## 2021-08-12 LAB
HBA1C MFR BLD: 8.4 % (ref 0–5.6)
INT CON NEG: NEGATIVE
INT CON POS: POSITIVE
T4 FREE SERPL-MCNC: 1.32 NG/DL (ref 0.93–1.7)
TSH SERPL DL<=0.005 MIU/L-ACNC: 1.65 UIU/ML (ref 0.38–5.33)

## 2021-08-12 PROCEDURE — 84443 ASSAY THYROID STIM HORMONE: CPT

## 2021-08-12 PROCEDURE — 99213 OFFICE O/P EST LOW 20 MIN: CPT | Performed by: NURSE PRACTITIONER

## 2021-08-12 PROCEDURE — 36415 COLL VENOUS BLD VENIPUNCTURE: CPT

## 2021-08-12 PROCEDURE — 83036 HEMOGLOBIN GLYCOSYLATED A1C: CPT | Performed by: NURSE PRACTITIONER

## 2021-08-12 PROCEDURE — 84439 ASSAY OF FREE THYROXINE: CPT

## 2021-08-12 ASSESSMENT — FIBROSIS 4 INDEX: FIB4 SCORE: 1.8

## 2021-08-12 NOTE — ASSESSMENT & PLAN NOTE
Here today for A1c check  8.4 today.  May was 10.9.  She was referred to pharmacy for help with medication control.  She refused to take any of the suggestiongs that they had to optimize her sugars. , including jardiance.  Currently taking glipizide SR 5 mg daily. Weight down 3 pounds.  Reports that since she has stopped the atorvastin her blood sugar is coming down.  Fasting Blood sugar this morning was 136.

## 2021-08-12 NOTE — ASSESSMENT & PLAN NOTE
Patient reports word finding issues.  Has not had thyroid checked in over one year.  Will recheck.  Daughter states that her mother has become more beligerent and difficult to live with.  Would like memory testing.

## 2021-08-12 NOTE — ASSESSMENT & PLAN NOTE
Reports today that she is not taking her atorvastatin. Reports that she feels that this has increased her blood sugars.

## 2021-08-12 NOTE — PROGRESS NOTES
Chief Complaint   Patient presents with   • Follow-Up   • Diabetes   • Medication Management       Subjective:   Bailey White is a 81 y.o. female here today for evaluation and management of:    CVA (Cerebral Vascular Accident), h/o  Reports today that she is not taking her atorvastatin. Reports that she feels that this has increased her blood sugars.     Uncontrolled type 2 diabetes mellitus with hyperglycemia (HCC)  Here today for A1c check  8.4 today.  May was 10.9.  She was referred to pharmacy for help with medication control.  She refused to take any of the suggestiongs that they had to optimize her sugars. , including jardiance.  Currently taking glipizide SR 5 mg daily. Weight down 3 pounds.  Reports that since she has stopped the atorvastin her blood sugar is coming down.  Fasting Blood sugar this morning was 136.     Word finding difficulty  Patient reports word finding issues.  Has not had thyroid checked in over one year.  Will recheck.  Daughter states that her mother has become more beligerent and difficult to live with.  Would like memory testing.          Current medicines (including changes today)  Current Outpatient Medications   Medication Sig Dispense Refill   • levothyroxine (SYNTHROID) 75 MCG Tab TAKE 1 TABLET EVERY MORNING ON AN EMPTY STOMACH 90 tablet 3   • acyclovir (ZOVIRAX) 200 MG Cap TAKE 1 CAPSULE EVERY DAY 90 capsule 3   • clopidogrel (PLAVIX) 75 MG Tab TAKE 1 TABLET EVERY DAY 90 tablet 3   • Continuous Blood Gluc  (FREESTYLE JONY 2 READER) Device 1 Each 3 times a day before meals. 1 Each 0   • Continuous Blood Gluc Sensor (FREESTYLE JONY 14 DAY SENSOR) Misc 1 Each every 14 days. 6 Each 3   • amLODIPine (NORVASC) 2.5 MG Tab TAKE 1 TABLET EVERY DAY 90 tablet 3   • glipiZIDE SR (GLUCOTROL) 5 MG TABLET SR 24 HR TAKE 1 TABLET TWICE DAILY 180 tablet 3   • omeprazole (PRILOSEC) 40 MG delayed-release capsule TAKE 1 CAPSULE TWICE DAILY 180 capsule 3   • Continuous Glucose  "Monitor Sup Kit 1 Each 3 times a day. 1 Kit 0   • polyethylene glycol/lytes (MIRALAX) 17 g Pack Take 17 g by mouth every day.     • docusate sodium (COLACE) 100 MG Cap Take 100 mg by mouth 2 times a day.     • Omega-3 Fatty Acids (OMEGA-3 PO) Take 1 Cap by mouth every day.     • Coenzyme Q10 (CO Q 10 PO) Take 1 Tab by mouth every day.     • Cranberry 200 MG Cap Take 1 Cap by mouth 2 Times a Day.     • Magnesium Gluconate (MAGNESIUM 27 PO) Take 1 Tab by mouth every day.     • Cholecalciferol (VITAMIN D) 2000 UNITS CAPS Take 2,000 Units by mouth every day.     • JARDIANCE 10 MG Tab TAKE 1 TABLET BY MOUTH EVERY DAY (Patient not taking: Reported on 8/12/2021) 30 tablet 0   • atorvastatin (LIPITOR) 40 MG Tab Take 1 tablet by mouth every evening. (Patient not taking: Reported on 8/12/2021) 90 tablet 4     No current facility-administered medications for this visit.     She  has a past medical history of Anesthesia, Arthritis, Blood transfusion, Bronchitis (2011), Carotid stenosis, CATARACT, CVA (cerebral vascular accident) ([facility]), Diabetes, Fibromyalgia, Fibromyalgia, GERD (gastroesophageal reflux disease), Heart burn, Herpes genital, Hiatus hernia syndrome, HTN, transient ischemic attack (TIA), Indigestion, Insomnia, Migraine, Pain, Seizure (HCC), Thyroid disease, TIA, Unspecified hemorrhagic conditions, Unspecified urinary incontinence, and Urinary bladder disorder. She also has no past medical history of ASTHMA or Breast cancer ([facility]).    ROS as stated in hpi  No chest pain, no shortness of breath, no abdominal pain       Objective:     /82 (BP Location: Left arm, Patient Position: Sitting)   Pulse 94   Temp 36 °C (96.8 °F) (Temporal)   Resp 18   Ht 1.6 m (5' 3\")   Wt 73.9 kg (163 lb)   SpO2 96%  Body mass index is 28.87 kg/m².   Physical Exam:  Constitutional: Alert, no distress.  Skin: Warm, dry, good turgor,no cyanosis, no rashes in visible areas.  Eye: Equal, round and reactive, conjunctiva clear, lids " normal..  Neck: Trachea midline, no masses, no obvious thyroid enlargement.. No cervical or supraclavicular lymphadenopathy. Range of motion within normal limits.  Neuro: Cranial nerves 2-12 grossly intact.  No sensory deficit.  Respiratory: Unlabored respiratory effort, lungs clear to auscultation, no wheezes, no ronchi.  Cardiovascular: Normal S1, S2, no murmur, no edema.  Psych: Alert and oriented x3, normal affect and mood and judgement.        Assessment and Plan:   The following treatment plan was discussed    1. Cerebrovascular accident (CVA) due to nonpyogenic cerebral venous thrombosis (HCC)  Chronic, ongoing, taking plavix.  Advised her to reconsider statin.  Saw cardiology yesterday who advised the same. monitor    2. Uncontrolled type 2 diabetes mellitus with hyperglycemia (HCC)  Chronic, ongoing. A1c improved to 8.4 today.  Glipizide SR daily.  Refused jardiance.  Is watching carbs with the help of her son and doing better.  Reviewed with patient that the improvement is most likely her dietary changes, not stopping the statin.    Patient would like the freestyle nasim.  She wants to continue to see Iain in pharmacy.    - POCT Hemoglobin A1C    3. Hypothyroidism, unspecified type  Chronic, ongoing, having some increased word finding  Thyroid studies ordered. Monitor,, follow and adjust if needed.  Recommended memory testing.   - TSH; Future  - FREE THYROXINE; Future    4. Word finding difficulty  See #3      Followup: No follow-ups on file.         Educated in proper administration of medication(s) ordered today including safety, possible SE, risks, benefits, rationale and alternatives to therapy.     Please note that this dictation was created using voice recognition software. I have made every reasonable attempt to correct obvious errors, but I expect that there are errors of grammar and possibly content that I did not discover before finalizing the note.

## 2021-09-15 DIAGNOSIS — E11.65 UNCONTROLLED TYPE 2 DIABETES MELLITUS WITH HYPERGLYCEMIA (HCC): ICD-10-CM

## 2021-09-23 ENCOUNTER — HOSPITAL ENCOUNTER (OUTPATIENT)
Facility: MEDICAL CENTER | Age: 81
End: 2021-09-23
Attending: NURSE PRACTITIONER
Payer: MEDICARE

## 2021-09-23 ENCOUNTER — OFFICE VISIT (OUTPATIENT)
Dept: MEDICAL GROUP | Facility: PHYSICIAN GROUP | Age: 81
End: 2021-09-23
Payer: MEDICARE

## 2021-09-23 VITALS
WEIGHT: 163 LBS | TEMPERATURE: 98.6 F | HEIGHT: 63 IN | OXYGEN SATURATION: 96 % | DIASTOLIC BLOOD PRESSURE: 82 MMHG | HEART RATE: 69 BPM | SYSTOLIC BLOOD PRESSURE: 144 MMHG | BODY MASS INDEX: 28.88 KG/M2

## 2021-09-23 DIAGNOSIS — N30.01 ACUTE CYSTITIS WITH HEMATURIA: ICD-10-CM

## 2021-09-23 PROBLEM — R30.0 DYSURIA: Status: ACTIVE | Noted: 2020-03-11

## 2021-09-23 LAB
APPEARANCE UR: NORMAL
BILIRUB UR STRIP-MCNC: NEGATIVE MG/DL
COLOR UR AUTO: NORMAL
GLUCOSE UR STRIP.AUTO-MCNC: 250 MG/DL
KETONES UR STRIP.AUTO-MCNC: 15 MG/DL
LEUKOCYTE ESTERASE UR QL STRIP.AUTO: NORMAL
NITRITE UR QL STRIP.AUTO: NEGATIVE
PH UR STRIP.AUTO: 5.5 [PH] (ref 5–8)
PROT UR QL STRIP: NEGATIVE MG/DL
RBC UR QL AUTO: NORMAL
SP GR UR STRIP.AUTO: 1.02
UROBILINOGEN UR STRIP-MCNC: 0.2 MG/DL

## 2021-09-23 PROCEDURE — 81002 URINALYSIS NONAUTO W/O SCOPE: CPT | Performed by: NURSE PRACTITIONER

## 2021-09-23 PROCEDURE — 99214 OFFICE O/P EST MOD 30 MIN: CPT | Performed by: NURSE PRACTITIONER

## 2021-09-23 PROCEDURE — 87186 SC STD MICRODIL/AGAR DIL: CPT

## 2021-09-23 PROCEDURE — 87086 URINE CULTURE/COLONY COUNT: CPT

## 2021-09-23 PROCEDURE — 87077 CULTURE AEROBIC IDENTIFY: CPT

## 2021-09-23 RX ORDER — NITROFURANTOIN 25; 75 MG/1; MG/1
100 CAPSULE ORAL 2 TIMES DAILY
Qty: 10 CAPSULE | Refills: 0 | Status: SHIPPED | OUTPATIENT
Start: 2021-09-23 | End: 2021-09-28

## 2021-09-23 RX ORDER — PHENAZOPYRIDINE HYDROCHLORIDE 200 MG/1
200 TABLET, FILM COATED ORAL 3 TIMES DAILY PRN
Qty: 6 TABLET | Refills: 0 | Status: CANCELLED | OUTPATIENT
Start: 2021-09-23

## 2021-09-23 ASSESSMENT — FIBROSIS 4 INDEX: FIB4 SCORE: 1.8

## 2021-09-23 NOTE — ASSESSMENT & PLAN NOTE
Symptom onset: today at 1 pm  Current symptoms: painful voiding. No blood noted in urine.  Since onset symptoms are: worsening  Treatments tried: none  Associated symptoms: Positive for chills. Negative for fever, flank pain, nausea and vomiting, vaginal discharge, pelvic pain.  History is positive for frequent UTI, but it has been a while since her last UTI.

## 2021-09-23 NOTE — PROGRESS NOTES
CC:   Chief Complaint   Patient presents with   • UTI     x 1 day     HISTORY OF THE PRESENT ILLNESS: Patient is a 81 y.o. female. This pleasant patient is here today with new onset dysuria starting today at 1 PM.    Health Maintenance: Reviewed    Dysuria  Symptom onset: today at 1 pm  Current symptoms: painful voiding. No blood noted in urine.  Since onset symptoms are: worsening  Treatments tried: none  Associated symptoms: Positive for chills. Negative for fever, flank pain, nausea and vomiting, vaginal discharge, pelvic pain.  History is positive for frequent UTI, but it has been a while since her last UTI.      Allergies: Asa [aspirin], Ciprofloxacin, Clindamycin, Codeine, Food, Hydrocortisone, Ibuprofen, Imitrex [sumatriptan succinate], Inderal [propranolol hcl], Lyrica, Mellaril, Other drug, Pcn [penicillins], Ropinirole hcl [requip], Shellfish allergy, Sulfa drugs, and Tagamet [cimetidine]  Current Outpatient Medications Ordered in Epic   Medication Sig Dispense Refill   • nitrofurantoin (MACROBID) 100 MG Cap Take 1 Capsule by mouth 2 times a day for 5 days. 10 Capsule 0   • levothyroxine (SYNTHROID) 75 MCG Tab TAKE 1 TABLET EVERY MORNING ON AN EMPTY STOMACH 90 tablet 3   • acyclovir (ZOVIRAX) 200 MG Cap TAKE 1 CAPSULE EVERY DAY 90 capsule 3   • clopidogrel (PLAVIX) 75 MG Tab TAKE 1 TABLET EVERY DAY 90 tablet 3   • amLODIPine (NORVASC) 2.5 MG Tab TAKE 1 TABLET EVERY DAY 90 tablet 3   • glipiZIDE SR (GLUCOTROL) 5 MG TABLET SR 24 HR TAKE 1 TABLET TWICE DAILY 180 tablet 3   • omeprazole (PRILOSEC) 40 MG delayed-release capsule TAKE 1 CAPSULE TWICE DAILY 180 capsule 3   • Continuous Glucose Monitor Sup Kit 1 Each 3 times a day. 1 Kit 0   • polyethylene glycol/lytes (MIRALAX) 17 g Pack Take 17 g by mouth every day.     • docusate sodium (COLACE) 100 MG Cap Take 100 mg by mouth 2 times a day.     • Omega-3 Fatty Acids (OMEGA-3 PO) Take 1 Cap by mouth every day.     • Cranberry 200 MG Cap Take 1 Cap by mouth 2  Times a Day.     • Magnesium Gluconate (MAGNESIUM 27 PO) Take 1 Tab by mouth every day.     • Cholecalciferol (VITAMIN D) 2000 UNITS CAPS Take 2,000 Units by mouth every day.       No current [facility]-ordered facility-administered medications on file.     Past Medical History:   Diagnosis Date   • Anesthesia     woke up during foot surgery   • Arthritis    • Blood transfusion    • Bronchitis    • Carotid stenosis    • CATARACT     repaired   • CVA (cerebral vascular accident) (HCC)    • Diabetes     off metformin diet controlled   • Fibromyalgia    • Fibromyalgia    • GERD (gastroesophageal reflux disease)    • Heart burn    • Herpes genital    • Hiatus hernia syndrome    • HTN    • Hx of transient ischemic attack (TIA)    • Indigestion    • Insomnia    • Migraine    • Pain    • Seizure (HCC)     last seizure    • Thyroid disease    • TIA     multiple   • Unspecified hemorrhagic conditions     on Plavix   • Unspecified urinary incontinence    • Urinary bladder disorder     incontinence     Past Surgical History:   Procedure Laterality Date   • PB THROMBOENDARTECTMY NECK,NECK INCIS Left 10/7/2020    Procedure: ENDARTERECTOMY, CAROTID;  Surgeon: Rene Verma M.D.;  Location: SURGERY [facility];  Service: Vascular   • HYSTEROSCOPY WITH VIDEO DIAGNOSTIC  2013    Performed by Portillo Barragan M.D. at SURGERY SAME DAY [facility]   • DILATION AND CURETTAGE  2013    Performed by Portillo Barragan M.D. at SURGERY SAME DAY [facility]   • OTHER      cataracts   • OTHER ORTHOPEDIC SURGERY     • TONSILLECTOMY       Social History     Tobacco Use   • Smoking status: Former Smoker     Packs/day: 3.00     Years: 1.00     Pack years: 3.00     Types: Cigarettes     Quit date: 1955     Years since quittin.7   • Smokeless tobacco: Never Used   Vaping Use   • Vaping Use: Never used   Substance Use Topics   • Alcohol use: No     Alcohol/week: 0.0 oz   • Drug use: No     Social History     Social History  "Narrative   • Not on file     Family History   Problem Relation Age of Onset   • Heart Disease Father    • Cancer Maternal Grandmother      ROS:   Constitutional: + chills. No fevers, malaise/fatigue.  Eyes: No eye pain.  ENT: No sore throat, congestion.   Resp: No cough, shortness of breath.  CV: No chest pain, leg swelling, palpitations.  GI: No nausea/vomiting, abdominal pain, constipation, diarrhea.  : + dysuria. No hematuria.  MSK: No weakness.  Skin: No rashes.  Neuro: No dizziness, weakness, headaches.  Psych: No suicidal ideations.    All remaining systems reviewed and found to be negative, except as stated above.        Exam: /82 (BP Location: Left arm, Patient Position: Sitting, BP Cuff Size: Adult)   Pulse 69   Temp 37 °C (98.6 °F) (Temporal)   Ht 1.6 m (5' 3\")   Wt 73.9 kg (163 lb)   SpO2 96%  Body mass index is 28.87 kg/m².    General: Well nourished, well developed female in NAD, awake and conversant.  Eyes: Normal conjunctiva, anicteric.  Round symmetrical pupils.  ENT: Hearing grossly intact.  No nasal discharge.  Neck: Neck is supple.  No masses or thyromegaly.  CV: No lower extremity edema.  Respiratory: Respirations are nonlabored.  No wheezing.  Abdomen: Non-Distended.  Skin: Warm.  No rashes or ulcers.  MSK: Normal ambulation.  No clubbing or cyanosis.  Neuro: Sensation and CN II-XII grossly normal.  Psych: Alert and oriented.  Cooperative, appropriate mood and affect, normal judgment.     Assessment/Plan:  1. Acute cystitis with hematuria  New problem, abnormal urine dipstick in office. Urine sent for culture, will notify of results through [employer].    Start nitrofurantoin 100 mg twice daily for 5 days, patient has tolerated this antibiotic in the past.  Provided education to drink plenty of fluids, wipe front to back every void and bowel movement.   Return to clinic if symptoms not improving within 3-4 days or in case of vomiting, fever, increasing pain.   Advised patient and her " daughter to be seen in the emergency room with worsening of symptoms, new onset confusion.  - POCT Urinalysis  - URINE CULTURE(NEW); Future  - nitrofurantoin (MACROBID) 100 MG Cap; Take 1 Capsule by mouth 2 times a day for 5 days.  Dispense: 10 Capsule; Refill: 0     Educated in proper administration of medication(s) ordered today including safety, possible SE, risks, benefits, rationale and alternatives to therapy.   Supportive care, differential diagnoses, and indications for immediate follow-up discussed with patient.    Pathogenesis of diagnosis discussed including typical length and natural progression.    Instructed to return to clinic or nearest emergency department for any change in condition, further concerns, or worsening of symptoms.  Patient states understanding of the plan of care and discharge instructions.    Return if symptoms worsen or fail to improve.    I have placed the below orders and discussed them with an approved delegating provider. The MA is performing the below orders under the direction of Dr. Goldstein.    Please note that this dictation was created using voice recognition software. I have made every reasonable attempt to correct obvious errors, but I expect that there are errors of grammar and possibly content that I did not discover before finalizing the note.

## 2021-11-15 ENCOUNTER — OFFICE VISIT (OUTPATIENT)
Dept: MEDICAL GROUP | Facility: PHYSICIAN GROUP | Age: 81
End: 2021-11-15
Payer: MEDICARE

## 2021-11-15 VITALS
DIASTOLIC BLOOD PRESSURE: 80 MMHG | TEMPERATURE: 97.1 F | BODY MASS INDEX: 29.23 KG/M2 | OXYGEN SATURATION: 94 % | WEIGHT: 165 LBS | HEIGHT: 63 IN | SYSTOLIC BLOOD PRESSURE: 126 MMHG | HEART RATE: 85 BPM

## 2021-11-15 DIAGNOSIS — N30.01 ACUTE CYSTITIS WITH HEMATURIA: ICD-10-CM

## 2021-11-15 LAB
APPEARANCE UR: NORMAL
BILIRUB UR STRIP-MCNC: NORMAL MG/DL
COLOR UR AUTO: NORMAL
GLUCOSE UR STRIP.AUTO-MCNC: NORMAL MG/DL
KETONES UR STRIP.AUTO-MCNC: NORMAL MG/DL
LEUKOCYTE ESTERASE UR QL STRIP.AUTO: NORMAL
NITRITE UR QL STRIP.AUTO: NORMAL
PH UR STRIP.AUTO: 6 [PH] (ref 5–8)
PROT UR QL STRIP: 30 MG/DL
RBC UR QL AUTO: NORMAL
SP GR UR STRIP.AUTO: 1.02
UROBILINOGEN UR STRIP-MCNC: 0.2 MG/DL

## 2021-11-15 PROCEDURE — 99213 OFFICE O/P EST LOW 20 MIN: CPT | Performed by: STUDENT IN AN ORGANIZED HEALTH CARE EDUCATION/TRAINING PROGRAM

## 2021-11-15 PROCEDURE — 81002 URINALYSIS NONAUTO W/O SCOPE: CPT | Performed by: STUDENT IN AN ORGANIZED HEALTH CARE EDUCATION/TRAINING PROGRAM

## 2021-11-15 RX ORDER — NITROFURANTOIN MACROCRYSTALS 100 MG/1
CAPSULE ORAL
Qty: 14 CAPSULE | Refills: 1 | Status: SHIPPED
Start: 2021-11-15 | End: 2021-11-15

## 2021-11-15 RX ORDER — NITROFURANTOIN MACROCRYSTALS 100 MG/1
100 CAPSULE ORAL 2 TIMES DAILY
Qty: 14 CAPSULE | Refills: 0 | Status: SHIPPED | OUTPATIENT
Start: 2021-11-15 | End: 2021-11-22

## 2021-11-15 ASSESSMENT — PATIENT HEALTH QUESTIONNAIRE - PHQ9: CLINICAL INTERPRETATION OF PHQ2 SCORE: 0

## 2021-11-15 ASSESSMENT — FIBROSIS 4 INDEX: FIB4 SCORE: 1.8

## 2021-11-15 NOTE — ASSESSMENT & PLAN NOTE
Patient notes 2-3 day history of increasing pain with urination, that was followed by passage of a blood clot (apparently through urine).… After passage of blood clot, she states she felt much better, and resolved her pain.  However, it then gradually began to increase pain/dysuria once again.  She states this is a typical pattern for her, when having UTIs.  And, will usually feel better within days of starting antibiotic.   ... POCT UA, with trace leukocyte esterase (and large blood, but negative nitrite).  Previously treated for UTI, couple months ago, and culture at that time shows pan sensitivity for E. coli.  Previously treated with Macrobid for 5 days.    - Nitrofurantoin 100, twice daily, x7 days.     (Increased, due to repeat/recurrence).     … Also provided 1 refill, that she can use at the           start of next symptoms (for 1 recurrence).  - However, after that time, she should have urine     further assessed, to see if there is still any blood in     the urine, when not having a UTI.  -Patient will follow up with PCP:  JACE Barfield.

## 2022-03-25 RX ORDER — OMEPRAZOLE 40 MG/1
40 CAPSULE, DELAYED RELEASE ORAL 2 TIMES DAILY
Qty: 60 CAPSULE | Refills: 0 | Status: SHIPPED | OUTPATIENT
Start: 2022-03-25

## 2022-11-03 ENCOUNTER — PATIENT MESSAGE (OUTPATIENT)
Dept: HEALTH INFORMATION MANAGEMENT | Facility: OTHER | Age: 82
End: 2022-11-03

## 2023-11-29 ENCOUNTER — PATIENT MESSAGE (OUTPATIENT)
Dept: HEALTH INFORMATION MANAGEMENT | Facility: OTHER | Age: 83
End: 2023-11-29

## 2024-04-29 NOTE — PROGRESS NOTES
Neurology Progress Note  Neurohospitalist Service, [facility]    Referring Physician: Francisco J Starks M.D.    Chief Complaint   Patient presents with   • Possible Stroke       HPI: Refer to initial documented Neurology H&P, as detailed in the patient's chart.    Interval History October 7, 2020: No new events overnight.  Patient denies any weakness or numbness.  She does say she has difficulties getting certain words out.    Review of systems: In addition to what is detailed in the HPI and/or updated in the interval history, all other systems reviewed and are negative.    Past Medical History:    has a past medical history of Anesthesia, Arthritis, Blood transfusion, Bronchitis (2011), Carotid stenosis, CATARACT, CVA (cerebral vascular accident) (HCC), Diabetes, Fibromyalgia, Fibromyalgia, GERD (gastroesophageal reflux disease), Heart burn, Herpes genital, Hiatus hernia syndrome, HTN, transient ischemic attack (TIA), Indigestion, Insomnia, Migraine, Pain, Seizure (HCC), Thyroid disease, TIA, Unspecified hemorrhagic conditions, Unspecified urinary incontinence, and Urinary bladder disorder. She also has no past medical history of ASTHMA or Breast cancer (formerly [facility]).    FHx:  family history includes Cancer in her maternal grandmother; Heart Disease in her father.    SHx:   reports that she quit smoking about 65 years ago. Her smoking use included cigarettes. She has a 3.00 pack-year smoking history. She has never used smokeless tobacco. She reports that she does not drink alcohol or use drugs.    Medications:    Current Facility-Administered Medications:   •  insulin glargine (Lantus) injection, 15 Units, Subcutaneous, QAM INSULIN, Francisco J Starks M.D., 15 Units at 10/07/20 0805  •  heparin infusion 25,000 units in 500 mL 0.45% NACL, 0-30 Units/kg/hr (Adjusted), Intravenous, Continuous, Francisco J Starks M.D., Last Rate: 15.2 mL/hr at 10/07/20 0703, 12 Units/kg/hr at 10/07/20 0703  •  acetaminophen  (TYLENOL) tablet 650 mg, 650 mg, Oral, Q6HRS PRN, Hailey Jolley M.D., 650 mg at 10/06/20 2155  •  ondansetron (ZOFRAN) syringe/vial injection 4 mg, 4 mg, Intravenous, Q4HRS PRN, Hailey Jolley M.D.  •  ondansetron (ZOFRAN ODT) dispertab 4 mg, 4 mg, Oral, Q4HRS PRN, Hailey Jolley M.D.  •  senna-docusate (PERICOLACE or SENOKOT S) 8.6-50 MG per tablet 2 Tab, 2 Tab, Oral, BID, 2 Tab at 10/06/20 0540 **AND** polyethylene glycol/lytes (MIRALAX) PACKET 1 Packet, 1 Packet, Oral, QDAY PRN **AND** magnesium hydroxide (MILK OF MAGNESIA) suspension 30 mL, 30 mL, Oral, QDAY PRN **AND** bisacodyl (DULCOLAX) suppository 10 mg, 10 mg, Rectal, QDAY PRN, Hailey Jolley M.D.  •  Pharmacy consult request - Allow for permissive hypertension: SBP up to 220 mmHg/DBP up to 120 mmHg x 48 hours, , Other, PHARMACY TO DOSE, Hailey Jolley M.D.  •  atorvastatin (LIPITOR) tablet 40 mg, 40 mg, Oral, Q EVENING, Hailey Jolley M.D., 40 mg at 10/06/20 1724  •  insulin lispro (HumaLOG) injection, 1-6 Units, Subcutaneous, 4X/DAY ACHS, 2 Units at 10/07/20 0805 **AND** POC Blood Glucose, , , Q AC AND BEDTIME(S) **AND** NOTIFY MD and PharmD, , , Once **AND** glucose 4 g chewable tablet 16 g, 16 g, Oral, Q15 MIN PRN **AND** dextrose 50% (D50W) injection 50 mL, 50 mL, Intravenous, Q15 MIN PRN, Hailey Jolley M.D.  •  clopidogrel (PLAVIX) tablet 75 mg, 75 mg, Oral, DAILY, Hailey Jolley M.D., 75 mg at 10/07/20 0519  •  levothyroxine (SYNTHROID) tablet 75 mcg, 75 mcg, Oral, AM ES, Hailey Jolley M.D., 75 mcg at 10/07/20 0519  •  omeprazole (PRILOSEC) capsule 40 mg, 40 mg, Oral, BID, Hailey Jolley M.D., 40 mg at 10/07/20 0518  •  diphenhydrAMINE (BENADRYL) tablet/capsule 25 mg, 25 mg, Oral, HS PRN, Shannon Zavala, A.P.R.N., 25 mg at 10/06/20 3050    Physical Examination:     Vitals:    10/06/20 2000 10/06/20 2358 10/07/20 0400 10/07/20 0730   BP: 138/80 135/62 116/67 133/78   Pulse: 91 70 73 77   Resp: 16 16 16 16   Temp: 35.8 °C  (96.5 °F) 36.2 °C (97.2 °F) 35.8 °C (96.5 °F) 36.2 °C (97.1 °F)   TempSrc: Temporal Temporal Temporal Temporal   SpO2: 97% 98% 98% 97%   Weight:       Height:           General: Patient is awake and in no acute distress  Eyes: examination of optic disks not indicated at this time  CV: RRR    NEUROLOGICAL EXAM:     Mental status: Awake, alert and fully oriented, follows commands  Speech and language: speech is clear and fluent. The patient is able to name and repeat.  Cranial nerve exam: Pupils are equal, round and reactive to light bilaterally. Visual fields are full. Extraocular muscles are intact. Sensation in the face is intact to light touch. Face is symmetric. Hearing to finger rub equal. Palate elevates symmetrically. Shoulder shrug is full. Tongue is midline.  Motor exam: Strength is 5/5 in all extremities both distally and proximally. Tone is normal. No abnormal movements were seen on exam.  Sensory exam: No sensory deficits identified   Deep tendon reflexes:  2+ and symmetric. Toes down-going bilaterally.  Coordination: no ataxia       Objective Data:    Labs:  Lab Results   Component Value Date/Time    PROTHROMBTM 13.8 10/06/2020 07:08 PM    INR 1.03 10/06/2020 07:08 PM      Lab Results   Component Value Date/Time    WBC 6.3 10/06/2020 08:41 AM    RBC 4.95 10/06/2020 08:41 AM    HEMOGLOBIN 17.0 (H) 10/06/2020 08:41 AM    HEMATOCRIT 51.0 (H) 10/06/2020 08:41 AM    .0 (H) 10/06/2020 08:41 AM    MCH 34.3 (H) 10/06/2020 08:41 AM    MCHC 33.3 (L) 10/06/2020 08:41 AM    MPV 10.6 10/06/2020 08:41 AM    NEUTSPOLYS 59.10 10/06/2020 08:41 AM    LYMPHOCYTES 29.90 10/06/2020 08:41 AM    MONOCYTES 9.50 10/06/2020 08:41 AM    EOSINOPHILS 1.00 10/06/2020 08:41 AM    BASOPHILS 0.30 10/06/2020 08:41 AM      Lab Results   Component Value Date/Time    SODIUM 135 10/06/2020 08:41 AM    POTASSIUM 3.8 10/06/2020 08:41 AM    CHLORIDE 98 10/06/2020 08:41 AM    CO2 23 10/06/2020 08:41 AM    GLUCOSE 266 (H) 10/06/2020 08:41  AM    BUN 21 10/06/2020 08:41 AM    CREATININE 0.96 10/06/2020 08:41 AM    CREATININE 1.0 03/06/2009 09:50 AM      Lab Results   Component Value Date/Time    CHOLSTRLTOT 166 10/06/2020 08:41 AM    LDL 89 10/06/2020 08:41 AM    HDL 54 10/06/2020 08:41 AM    TRIGLYCERIDE 115 10/06/2020 08:41 AM       Lab Results   Component Value Date/Time    ALKPHOSPHAT 99 10/06/2020 08:41 AM    ASTSGOT 17 10/06/2020 08:41 AM    ALTSGPT 31 10/06/2020 08:41 AM    TBILIRUBIN 0.9 10/06/2020 08:41 AM        Imaging/Testing:  MR-BRAIN-W/O   Final Result      1.  Multifocal areas of tiny infarcts in the bilateral frontal, and left parietal, temporal and occipital lobes.   2.  Severe chronic microvascular ischemic disease.   3.  Mild cerebral volume loss.      DX-CHEST-PORTABLE (1 VIEW)   Final Result      Mild hypoinflation without other evidence for acute cardiopulmonary disease.      CT-CTA NECK WITH & W/O-POST PROCESSING   Final Result      1.  Approximately 75% narrowing at the origin of the left internal carotid artery.      2.  Approximately 25% focal stenosis at the origin of the right internal carotid artery.      3.  Patent bilateral vertebral arteries.      4.  Minimal narrowing at the origin of the left subclavian artery.      CT-CTA HEAD WITH & W/O-POST PROCESS   Final Result      CT angiogram of the [place] within normal limits.      CT-HEAD W/O   Final Result      1.  No acute intracranial process.      2.  Atrophy and small vessel ischemic change.            Assessment and Plan:    80-year-old female presented with right-sided weakness and aphasia.  Was not given TPA due to improved symptoms.  And outside window.  CTA head and neck showed a 75% left carotid stenosis, vascular surgeon was consulted.  T patient currently on heparin drip.  Waiting on CEA for this afternoon.  Doing markedly well.  Etiology infarcts most likely due to artery are embolic phenomenon from the thrombus in the left ICA.    Plan:  1.  Waiting  for CEA for this afternoon  2.  Continue Plavix  3.  Continue heparin per vascular surgeon request  4.  Blood pressures well controlled  5.  Continue statin  6.  Glucotrol per primary recommendations normoglycemia              The evaluation of the patient, and recommended management, was discussed with the resident staff. I have performed a physical exam and reviewed and updated ROS and Plan today (10/7/2020). In review of yesterday's note (10/6/2020), there are no changes except as documented above.    This chart was partially generated using voice recognition technology and sound alike word replacement may be present, best efforts were made to make the chart accurate.    Sixto Dixon MD  Board Certified Neurology, ABPN  (t) [phone]     37.8

## 2024-09-19 NOTE — DISCHARGE PLANNING
· RN CM spoke to Letha, in Scheduling, who will schedule Pt's follow up PCP appointment with NATI Barfield   show

## 2025-04-29 NOTE — ASSESSMENT & PLAN NOTE
Patient does not want to take crestor anymore due to a recent ad on tv.  Willing to try atorvastatin.   Opt out

## (undated) DEVICE — SHUNT CAROTID FLEXCEL 14.5CM - 5/BX

## (undated) DEVICE — SODIUM CHL. INJ. 0.9% 500ML (24EA/CA 50CA/PF)

## (undated) DEVICE — SHEET THYROID - (10EA/CA)

## (undated) DEVICE — SODIUM CHL IRRIGATION 0.9% 1000ML (12EA/CA)

## (undated) DEVICE — SUTURE 2-0 VICRYL PLUS CT-1 - 8 X 18 INCH(12/BX)

## (undated) DEVICE — DERMABOND ADVANCED - (12EA/BX)

## (undated) DEVICE — GLOVE BIOGEL INDICATOR SZ 8 SURGICAL PF LTX - (50/BX 4BX/CA)

## (undated) DEVICE — GLOVE BIOGEL SZ 7.5 SURGICAL PF LTX - (50PR/BX 4BX/CA)

## (undated) DEVICE — PAD LAP STERILE 18 X 18 - (5/PK 40PK/CA)

## (undated) DEVICE — GLOVE BIOGEL PI INDICATOR SZ 7.0 SURGICAL PF LF - (50/BX 4BX/CA)

## (undated) DEVICE — PROTECTOR ULNA NERVE - (36PR/CA)

## (undated) DEVICE — SUTURE 6-0 PROLENE BV-1 D/A 30 (36PK/BX)"

## (undated) DEVICE — SHUNT PRUITT CAROTID T 9 FR---MUST ORDER MINIMUM OF 10---

## (undated) DEVICE — TUBE E-T HI-LO CUFF 6.5MM (10EA/BX)

## (undated) DEVICE — SUTURE 7-0 PROLENE BV-1 D/A 24 (36PK/BX)"

## (undated) DEVICE — GLOVE SZ 7 BIOGEL PI MICRO - PF LF (50PR/BX 4BX/CA)

## (undated) DEVICE — CORDS BIPOLAR COAGULATION - 12FT STERILE DISP. (10EA/BX)

## (undated) DEVICE — SUTURE 6-0 PROLENE BV-1 D/A 24 (36PK/BX)"

## (undated) DEVICE — KIT RADIAL ARTERY 20GA W/MAX BARRIER AND BIOPATCH  (5EA/CA) #10740 IS FOR THE SET RADIAL ARTERIAL

## (undated) DEVICE — TUBING CLEARLINK DUO-VENT - C-FLO (48EA/CA)

## (undated) DEVICE — VESSELOOP MINI BLUE STERILE - SURG-I-LOOP (10EA/BX)

## (undated) DEVICE — GOWN WARMING STANDARD FLEX - (30/CA)

## (undated) DEVICE — DECANTER FLD BLS - (50/CA)

## (undated) DEVICE — TRANSDUCER ADULT DISP. SINGLE BONDED STERILE - (20EA/CA)

## (undated) DEVICE — SUTURE 3-0 SILK 12 X 18 IN - (36/BX)

## (undated) DEVICE — GOWN SURGEONS LARGE - (32/CA)

## (undated) DEVICE — PACK MINOR BASIN - (2EA/CA)

## (undated) DEVICE — ELECTRODE 850 FOAM ADHESIVE - HYDROGEL RADIOTRNSPRNT (50/PK)

## (undated) DEVICE — CANISTER SUCTION 3000ML MECHANICAL FILTER AUTO SHUTOFF MEDI-VAC NONSTERILE LF DISP  (40EA/CA)

## (undated) DEVICE — SUTURE 4-0 SILK 12 X 18 INCH - (36/BX)

## (undated) DEVICE — KIT ANESTHESIA W/CIRCUIT & 3/LT BAG W/FILTER (20EA/CA)

## (undated) DEVICE — CONTAINER SPECIMEN BAG OR - STERILE 4 OZ W/LID (100EA/CA)

## (undated) DEVICE — MASK ANESTHESIA ADULT  - (100/CA)

## (undated) DEVICE — SYRINGE 20 ML LL (50EA/BX 4BX/CA)

## (undated) DEVICE — WIPE INSTRUMENT MICROWIPE (20EA/SP)

## (undated) DEVICE — GLOVE SZ 6.5 BIOGEL PI MICRO - PF LF (50PR/BX)

## (undated) DEVICE — HEAD HOLDER JUNIOR/ADULT

## (undated) DEVICE — TUBE CONNECT SUCTION CLEAR 120 X 1/4" (50EA/CA)"

## (undated) DEVICE — BLANKET WARMING LOWER BODY - (10/CA) INACTIVE USE #8585

## (undated) DEVICE — BLADE SURGICAL #11 - (50/BX)

## (undated) DEVICE — TOWELS CLOTH SURGICAL - (4/PK 20PK/CA)

## (undated) DEVICE — KIT SURGIFLO W/OUT THROMBIN - (6EA/CA)

## (undated) DEVICE — DRAPE MAGNETIC (INSTRA-MAG) - (30/CA)

## (undated) DEVICE — SUTURE GENERAL

## (undated) DEVICE — SPONGE PEANUT - (5/PK 50PK/CA)

## (undated) DEVICE — CLIP SM INTNL HRZN TI ESCP LGT - (24EA/PK 25PK/BX)

## (undated) DEVICE — DRAPE LARGE 3 QUARTER - (20/CA)

## (undated) DEVICE — CHLORAPREP 26 ML APPLICATOR - ORANGE TINT(25/CA)

## (undated) DEVICE — STAPLER SKIN DISP - (6/BX 10BX/CA) VISISTAT

## (undated) DEVICE — LACTATED RINGERS INJ 1000 ML - (14EA/CA 60CA/PF)

## (undated) DEVICE — DRAPE IOBAN II INCISE 23X17 - (10EA/BX 4BX/CA)

## (undated) DEVICE — SET EXTENSION WITH 2 PORTS (48EA/CA) ***PART #2C8610 IS A SUBSTITUTE*****

## (undated) DEVICE — SUTURE 3-0 VICRYL PLUS SH - 27 INCH (36/BX)

## (undated) DEVICE — SUTURE CV

## (undated) DEVICE — VESSELOOP MAXI BLUE STERILE- SURG-I-LOOP (10EA/BX)

## (undated) DEVICE — CLIP MED INTNL HRZN TI ESCP - (25/BX)

## (undated) DEVICE — SUCTION INSTRUMENT YANKAUER BULBOUS TIP W/O VENT (50EA/CA)

## (undated) DEVICE — SUTURE 4-0 MONOCRYL PLUS PS-1 - 27 INCH (36/BX)

## (undated) DEVICE — SENSOR SPO2 NEO LNCS ADHESIVE (20/BX) SEE USER NOTES